# Patient Record
Sex: MALE | Race: WHITE | Employment: FULL TIME | URBAN - METROPOLITAN AREA
[De-identification: names, ages, dates, MRNs, and addresses within clinical notes are randomized per-mention and may not be internally consistent; named-entity substitution may affect disease eponyms.]

---

## 2019-07-03 ENCOUNTER — APPOINTMENT (OUTPATIENT)
Dept: GENERAL RADIOLOGY | Age: 64
DRG: 281 | End: 2019-07-03
Attending: EMERGENCY MEDICINE
Payer: COMMERCIAL

## 2019-07-03 ENCOUNTER — HOSPITAL ENCOUNTER (INPATIENT)
Age: 64
LOS: 1 days | Discharge: ANOTHER HOSPITAL | DRG: 281 | End: 2019-07-05
Attending: EMERGENCY MEDICINE | Admitting: INTERNAL MEDICINE
Payer: COMMERCIAL

## 2019-07-03 DIAGNOSIS — I21.4 NSTEMI (NON-ST ELEVATED MYOCARDIAL INFARCTION) (HCC): Primary | ICD-10-CM

## 2019-07-03 PROBLEM — R03.0 ELEVATED BLOOD PRESSURE READING: Status: ACTIVE | Noted: 2019-07-03

## 2019-07-03 PROBLEM — R07.9 CHEST PAIN: Status: ACTIVE | Noted: 2019-07-03

## 2019-07-03 LAB
ALBUMIN SERPL-MCNC: 3.8 G/DL (ref 3.5–5)
ALBUMIN/GLOB SERPL: 1 {RATIO} (ref 1.1–2.2)
ALP SERPL-CCNC: 93 U/L (ref 45–117)
ALT SERPL-CCNC: 19 U/L (ref 12–78)
AMPHET UR QL SCN: NEGATIVE
ANION GAP SERPL CALC-SCNC: 9 MMOL/L (ref 5–15)
AST SERPL-CCNC: 7 U/L (ref 15–37)
ATRIAL RATE: 74 BPM
BARBITURATES UR QL SCN: NEGATIVE
BASOPHILS # BLD: 0 K/UL (ref 0–0.1)
BASOPHILS NFR BLD: 0 % (ref 0–1)
BENZODIAZ UR QL: NEGATIVE
BILIRUB SERPL-MCNC: 0.2 MG/DL (ref 0.2–1)
BUN SERPL-MCNC: 11 MG/DL (ref 6–20)
BUN/CREAT SERPL: 9 (ref 12–20)
CALCIUM SERPL-MCNC: 9.6 MG/DL (ref 8.5–10.1)
CALCULATED P AXIS, ECG09: 52 DEGREES
CALCULATED R AXIS, ECG10: 63 DEGREES
CALCULATED T AXIS, ECG11: 50 DEGREES
CANNABINOIDS UR QL SCN: NEGATIVE
CHLORIDE SERPL-SCNC: 102 MMOL/L (ref 97–108)
CO2 SERPL-SCNC: 28 MMOL/L (ref 21–32)
COCAINE UR QL SCN: NEGATIVE
COMMENT, HOLDF: NORMAL
CREAT SERPL-MCNC: 1.29 MG/DL (ref 0.7–1.3)
DIAGNOSIS, 93000: NORMAL
DIFFERENTIAL METHOD BLD: ABNORMAL
DRUG SCRN COMMENT,DRGCM: NORMAL
EOSINOPHIL # BLD: 0.2 K/UL (ref 0–0.4)
EOSINOPHIL NFR BLD: 2 % (ref 0–7)
ERYTHROCYTE [DISTWIDTH] IN BLOOD BY AUTOMATED COUNT: 15.2 % (ref 11.5–14.5)
ETHANOL SERPL-MCNC: <10 MG/DL
GLOBULIN SER CALC-MCNC: 3.9 G/DL (ref 2–4)
GLUCOSE SERPL-MCNC: 162 MG/DL (ref 65–100)
HCT VFR BLD AUTO: 40.4 % (ref 36.6–50.3)
HGB BLD-MCNC: 13.3 G/DL (ref 12.1–17)
LIPASE SERPL-CCNC: 186 U/L (ref 73–393)
LYMPHOCYTES # BLD: 1.6 K/UL
LYMPHOCYTES NFR BLD: 16 % (ref 12–49)
MCH RBC QN AUTO: 25.4 PG (ref 26–34)
MCHC RBC AUTO-ENTMCNC: 32.9 G/DL (ref 30–36.5)
MCV RBC AUTO: 77.1 FL (ref 80–99)
METHADONE UR QL: NEGATIVE
MONOCYTES # BLD: 0.5 K/UL (ref 0–1)
MONOCYTES NFR BLD: 5 % (ref 5–13)
NEUTS SEG # BLD: 7.4 K/UL (ref 1.8–8)
NEUTS SEG NFR BLD: 77 % (ref 32–75)
OPIATES UR QL: NEGATIVE
P-R INTERVAL, ECG05: 174 MS
PCP UR QL: NEGATIVE
PLATELET # BLD AUTO: 276 K/UL (ref 150–400)
PMV BLD AUTO: 10.1 FL (ref 8.9–12.9)
POTASSIUM SERPL-SCNC: 4.2 MMOL/L (ref 3.5–5.1)
PROT SERPL-MCNC: 7.7 G/DL (ref 6.4–8.2)
Q-T INTERVAL, ECG07: 390 MS
QRS DURATION, ECG06: 92 MS
QTC CALCULATION (BEZET), ECG08: 432 MS
RBC # BLD AUTO: 5.24 M/UL (ref 4.1–5.7)
SAMPLES BEING HELD,HOLD: NORMAL
SODIUM SERPL-SCNC: 139 MMOL/L (ref 136–145)
TROPONIN I SERPL-MCNC: 0.08 NG/ML
TROPONIN I SERPL-MCNC: 0.29 NG/ML
TSH SERPL DL<=0.05 MIU/L-ACNC: 0.13 UIU/ML (ref 0.36–3.74)
VENTRICULAR RATE, ECG03: 74 BPM
WBC # BLD AUTO: 9.6 K/UL (ref 4.1–11.1)

## 2019-07-03 PROCEDURE — 84484 ASSAY OF TROPONIN QUANT: CPT

## 2019-07-03 PROCEDURE — 93005 ELECTROCARDIOGRAM TRACING: CPT

## 2019-07-03 PROCEDURE — 99218 HC RM OBSERVATION: CPT

## 2019-07-03 PROCEDURE — 99285 EMERGENCY DEPT VISIT HI MDM: CPT

## 2019-07-03 PROCEDURE — 96374 THER/PROPH/DIAG INJ IV PUSH: CPT

## 2019-07-03 PROCEDURE — 84443 ASSAY THYROID STIM HORMONE: CPT

## 2019-07-03 PROCEDURE — 96372 THER/PROPH/DIAG INJ SC/IM: CPT

## 2019-07-03 PROCEDURE — 80053 COMPREHEN METABOLIC PANEL: CPT

## 2019-07-03 PROCEDURE — 71046 X-RAY EXAM CHEST 2 VIEWS: CPT

## 2019-07-03 PROCEDURE — 74011250636 HC RX REV CODE- 250/636: Performed by: INTERNAL MEDICINE

## 2019-07-03 PROCEDURE — 74011250636 HC RX REV CODE- 250/636: Performed by: EMERGENCY MEDICINE

## 2019-07-03 PROCEDURE — 87086 URINE CULTURE/COLONY COUNT: CPT

## 2019-07-03 PROCEDURE — 85025 COMPLETE CBC W/AUTO DIFF WBC: CPT

## 2019-07-03 PROCEDURE — 74011250637 HC RX REV CODE- 250/637: Performed by: INTERNAL MEDICINE

## 2019-07-03 PROCEDURE — 80307 DRUG TEST PRSMV CHEM ANLYZR: CPT

## 2019-07-03 PROCEDURE — 74011000250 HC RX REV CODE- 250: Performed by: EMERGENCY MEDICINE

## 2019-07-03 PROCEDURE — 74011250637 HC RX REV CODE- 250/637: Performed by: EMERGENCY MEDICINE

## 2019-07-03 PROCEDURE — 83690 ASSAY OF LIPASE: CPT

## 2019-07-03 PROCEDURE — 81001 URINALYSIS AUTO W/SCOPE: CPT

## 2019-07-03 PROCEDURE — 36415 COLL VENOUS BLD VENIPUNCTURE: CPT

## 2019-07-03 RX ORDER — OMEPRAZOLE 40 MG/1
40 CAPSULE, DELAYED RELEASE ORAL DAILY
COMMUNITY
End: 2021-08-13

## 2019-07-03 RX ORDER — SODIUM CHLORIDE 0.9 % (FLUSH) 0.9 %
5-40 SYRINGE (ML) INJECTION AS NEEDED
Status: DISCONTINUED | OUTPATIENT
Start: 2019-07-03 | End: 2019-07-05 | Stop reason: HOSPADM

## 2019-07-03 RX ORDER — ONDANSETRON 2 MG/ML
4 INJECTION INTRAMUSCULAR; INTRAVENOUS
Status: DISCONTINUED | OUTPATIENT
Start: 2019-07-03 | End: 2019-07-05 | Stop reason: HOSPADM

## 2019-07-03 RX ORDER — NALOXONE HYDROCHLORIDE 0.4 MG/ML
0.4 INJECTION, SOLUTION INTRAMUSCULAR; INTRAVENOUS; SUBCUTANEOUS AS NEEDED
Status: DISCONTINUED | OUTPATIENT
Start: 2019-07-03 | End: 2019-07-05 | Stop reason: HOSPADM

## 2019-07-03 RX ORDER — ACETAMINOPHEN 325 MG/1
650 TABLET ORAL
Status: DISCONTINUED | OUTPATIENT
Start: 2019-07-03 | End: 2019-07-05 | Stop reason: HOSPADM

## 2019-07-03 RX ORDER — DIPHENHYDRAMINE HCL 25 MG
25 CAPSULE ORAL
Status: DISCONTINUED | OUTPATIENT
Start: 2019-07-03 | End: 2019-07-05 | Stop reason: HOSPADM

## 2019-07-03 RX ORDER — GUAIFENESIN 100 MG/5ML
324 LIQUID (ML) ORAL
Status: COMPLETED | OUTPATIENT
Start: 2019-07-03 | End: 2019-07-03

## 2019-07-03 RX ORDER — CLOPIDOGREL BISULFATE 75 MG/1
300 TABLET ORAL
Status: COMPLETED | OUTPATIENT
Start: 2019-07-03 | End: 2019-07-03

## 2019-07-03 RX ORDER — ZOLPIDEM TARTRATE 5 MG/1
5 TABLET ORAL
Status: DISCONTINUED | OUTPATIENT
Start: 2019-07-03 | End: 2019-07-05 | Stop reason: HOSPADM

## 2019-07-03 RX ORDER — ENOXAPARIN SODIUM 100 MG/ML
1 INJECTION SUBCUTANEOUS
Status: COMPLETED | OUTPATIENT
Start: 2019-07-03 | End: 2019-07-03

## 2019-07-03 RX ORDER — LABETALOL HYDROCHLORIDE 5 MG/ML
20 INJECTION, SOLUTION INTRAVENOUS
Status: DISCONTINUED | OUTPATIENT
Start: 2019-07-03 | End: 2019-07-03

## 2019-07-03 RX ORDER — SODIUM CHLORIDE 0.9 % (FLUSH) 0.9 %
5-40 SYRINGE (ML) INJECTION EVERY 8 HOURS
Status: DISCONTINUED | OUTPATIENT
Start: 2019-07-03 | End: 2019-07-05 | Stop reason: HOSPADM

## 2019-07-03 RX ORDER — FAMOTIDINE 10 MG/ML
20 INJECTION INTRAVENOUS
Status: COMPLETED | OUTPATIENT
Start: 2019-07-03 | End: 2019-07-03

## 2019-07-03 RX ADMIN — ALUMINUM HYDROXIDE AND MAGNESIUM HYDROXIDE 15 ML: 200; 200 SUSPENSION ORAL at 21:33

## 2019-07-03 RX ADMIN — ASPIRIN 81 MG 324 MG: 81 TABLET ORAL at 17:40

## 2019-07-03 RX ADMIN — FAMOTIDINE 20 MG: 10 INJECTION, SOLUTION INTRAVENOUS at 21:34

## 2019-07-03 RX ADMIN — CLOPIDOGREL BISULFATE 300 MG: 75 TABLET ORAL at 19:32

## 2019-07-03 RX ADMIN — FAMOTIDINE 20 MG: 10 INJECTION, SOLUTION INTRAVENOUS at 17:42

## 2019-07-03 RX ADMIN — Medication 10 ML: at 21:34

## 2019-07-03 RX ADMIN — LIDOCAINE HYDROCHLORIDE 40 ML: 20 SOLUTION ORAL; TOPICAL at 17:43

## 2019-07-03 RX ADMIN — NITROGLYCERIN 1 INCH: 20 OINTMENT TOPICAL at 19:31

## 2019-07-03 RX ADMIN — ENOXAPARIN SODIUM 90 MG: 100 INJECTION SUBCUTANEOUS at 19:32

## 2019-07-03 NOTE — Clinical Note
TRANSFER - IN REPORT:  
 
Verbal report received from: [facility]. Report consisted of patient's Situation, Background, Assessment and  
Recommendations(SBAR). Opportunity for questions and clarification was provided. Assessment completed upon patient's arrival to unit and care assumed. Patient transported with a Registered Nurse, [address] [place] [address] / Patient Care Tech and Monitor.

## 2019-07-03 NOTE — Clinical Note
TRANSFER - OUT REPORT:  
 
Verbal report given to: Ed. Report consisted of patient's Situation, Background, Assessment and  
Recommendations(SBAR). Opportunity for questions and clarification was provided. Patient transported with a Registered Nurse. Patient transported to: Brittney Ramirez.

## 2019-07-03 NOTE — ED NOTES
Spoke with dr Ramona Reeder cardiology and he wants pt to be admitted by hospitalist     Hospitalist Eulalio for Admission  7:55 PM    ED Room Number: WER04/04  Patient Name and age:  Jaimie Spencer 58 y.o.  male  Working Diagnosis:   1. NSTEMI (non-ST elevated myocardial infarction) ([facility]      Readmission: no  Isolation Requirements:  no  Recommended Level of Care:  telemetry  Code Status:  full  Other:  Dr Ramona Reeder wants hospitalist admit. Got plavix, aspirin, lovenox, nitropaste. Is cp free.  He is trying to find someone to take care of his dogs currently as they are home alone

## 2019-07-03 NOTE — ED TRIAGE NOTES
Patient stood up from working on his computer 1 1/2 hours ago and developed reflux and/or chest pain. \"I don't have any other symptoms. \"

## 2019-07-03 NOTE — ED PROVIDER NOTES
Pt presents to ED complaining of pain in the chest which started approx 1.5-2hr PTA. Pt reports that he had been sitting at his computer and stood up, then noted sxs. Tried taking OTC tums, lying down, both without significant change. Reports that when he laid down, he felt sxs go \"up into my throat a little. \"  Has h/o GERD (takes OTC omeprazole PRN), though states that these sxs are not typical of his GERD and he cannot think of any particular trigger. Denies sweating, SOB. Denies other radiation. Pt reports he has been told his BP has been high at dentist and doctor visits before, but when he checks it at home it is generally unremarkable. Doesn't truly have a PCP currently - goes to [facility].  Does see GI regularly for h/o UC which has been stable for some time. Denies h/o HLD, though reports it has been several years since his last full physical.  Prior smoker - quit approx 25yrs ago. Also reports that he has no known family h/o early CAD. Reports that he works out 2-3 times per week, including yesterday with cardio, and has been able to tolerate that without difficulty. Past Medical History:   Diagnosis Date    Gastrointestinal disorder        History reviewed. No pertinent surgical history. History reviewed. No pertinent family history. Social History     Socioeconomic History    Marital status: Not on file     Spouse name: Not on file    Number of children: Not on file    Years of education: Not on file    Highest education level: Not on file   Occupational History    Not on file   Social Needs    Financial resource strain: Not on file    Food insecurity:     Worry: Not on file     Inability: Not on file    Transportation needs:     Medical: Not on file     Non-medical: Not on file   Tobacco Use    Smoking status: Former Smoker    Smokeless tobacco: Never Used   Substance and Sexual Activity    Alcohol use:  Yes    Drug use: Not on file    Sexual activity: Not on file   Lifestyle    Physical activity:     Days per week: Not on file     Minutes per session: Not on file    Stress: Not on file   Relationships    Social connections:     Talks on phone: Not on file     Gets together: Not on file     Attends Gnosticist service: Not on file     Active member of club or organization: Not on file     Attends meetings of clubs or organizations: Not on file     Relationship status: Not on file    Intimate partner violence:     Fear of current or ex partner: Not on file     Emotionally abused: Not on file     Physically abused: Not on file     Forced sexual activity: Not on file   Other Topics Concern    Not on file   Social History Narrative    Not on file         ALLERGIES: Patient has no known allergies. Review of Systems   Constitutional: Negative. Negative for fever. HENT: Negative. Respiratory: Negative for cough and shortness of breath. Cardiovascular: Positive for chest pain. Negative for palpitations and leg swelling. Gastrointestinal: Negative. Negative for abdominal pain, nausea and vomiting. Genitourinary: Negative. Musculoskeletal: Negative. Allergic/Immunologic: Negative for immunocompromised state. Psychiatric/Behavioral: Negative. All other systems reviewed and are negative. Vitals:    07/03/19 1646   Pulse: 83   Resp: 14   Temp: 97.7 °F (36.5 °C)   SpO2: 98%   Weight: 85.4 kg (188 lb 4.4 oz)   Height: 6' (1.829 m)            Physical Exam   Constitutional: He is oriented to person, place, and time. He appears well-developed and well-nourished. HENT:   Head: Normocephalic and atraumatic. Eyes:   Conjunctivae clear. Neck: Normal range of motion. Neck supple. Cardiovascular: Normal rate and regular rhythm. Pulmonary/Chest: Effort normal and breath sounds normal.   Abdominal: Soft. Bowel sounds are normal. There is no tenderness. There is no rebound and no guarding.    Neurological: He is alert and oriented to person, place, and time. Skin: Skin is warm and dry. Capillary refill takes less than 2 seconds. Vitals reviewed. MDM  Number of Diagnoses or Management Options  Diagnosis management comments: 62y/o M with h/o UC, questionable h/o HTN, prior smoker presents to ED with chest pain. HPI and PE as noted. EKG, labs to include troponin, CXR, will give ASA for cardiac protection, meds for ? GI sxs as this may be a component. Will plan for 2hr rpt troponin if first is negative, dispo pending outcome. EKG performed today @ 1641hrs interpreted by me. NSR @ 74bpm with sinus arrythmia. Normal axis. Normal intervals. Borderline LVH, otherwise normal QRS. No acute ischemic changes. No priors available for comparison. HEART score = 1 (2 if giving HTN as dx)         Procedures    Care transitioned to Dr. Antonio Morfin at 1900 hrs.

## 2019-07-04 PROBLEM — I21.4 NSTEMI (NON-ST ELEVATED MYOCARDIAL INFARCTION) (HCC): Status: ACTIVE | Noted: 2019-07-04

## 2019-07-04 LAB
APPEARANCE UR: CLEAR
APTT PPP: 30.5 SEC (ref 22.1–32)
APTT PPP: 44.3 SEC (ref 22.1–32)
APTT PPP: 61.6 SEC (ref 22.1–32)
BACTERIA URNS QL MICRO: NEGATIVE /HPF
BASOPHILS # BLD: 0.1 K/UL (ref 0–0.1)
BASOPHILS NFR BLD: 1 % (ref 0–1)
BILIRUB UR QL: NEGATIVE
CHOLEST SERPL-MCNC: 150 MG/DL
COLOR UR: NORMAL
DIFFERENTIAL METHOD BLD: ABNORMAL
EOSINOPHIL # BLD: 0.3 K/UL (ref 0–0.4)
EOSINOPHIL NFR BLD: 3 % (ref 0–7)
EPITH CASTS URNS QL MICRO: NORMAL /LPF
ERYTHROCYTE [DISTWIDTH] IN BLOOD BY AUTOMATED COUNT: 15 % (ref 11.5–14.5)
EST. AVERAGE GLUCOSE BLD GHB EST-MCNC: 123 MG/DL
GLUCOSE UR STRIP.AUTO-MCNC: NEGATIVE MG/DL
HBA1C MFR BLD: 5.9 % (ref 4.2–6.3)
HCT VFR BLD AUTO: 39.2 % (ref 36.6–50.3)
HDLC SERPL-MCNC: 35 MG/DL
HDLC SERPL: 4.3 {RATIO} (ref 0–5)
HGB BLD-MCNC: 12.7 G/DL (ref 12.1–17)
HGB UR QL STRIP: NEGATIVE
IMM GRANULOCYTES # BLD AUTO: 0.1 K/UL (ref 0–0.04)
IMM GRANULOCYTES NFR BLD AUTO: 1 % (ref 0–0.5)
KETONES UR QL STRIP.AUTO: NEGATIVE MG/DL
LDLC SERPL CALC-MCNC: 90.4 MG/DL (ref 0–100)
LEUKOCYTE ESTERASE UR QL STRIP.AUTO: NEGATIVE
LIPID PROFILE,FLP: NORMAL
LYMPHOCYTES # BLD: 2.5 K/UL (ref 0.8–3.5)
LYMPHOCYTES NFR BLD: 23 % (ref 12–49)
MCH RBC QN AUTO: 25 PG (ref 26–34)
MCHC RBC AUTO-ENTMCNC: 32.4 G/DL (ref 30–36.5)
MCV RBC AUTO: 77 FL (ref 80–99)
MONOCYTES # BLD: 0.7 K/UL (ref 0–1)
MONOCYTES NFR BLD: 6 % (ref 5–13)
NEUTS SEG # BLD: 7.3 K/UL (ref 1.8–8)
NEUTS SEG NFR BLD: 66 % (ref 32–75)
NITRITE UR QL STRIP.AUTO: NEGATIVE
NRBC # BLD: 0 K/UL (ref 0–0.01)
NRBC BLD-RTO: 0 PER 100 WBC
PH UR STRIP: 7 [PH] (ref 5–8)
PLATELET # BLD AUTO: 265 K/UL (ref 150–400)
PMV BLD AUTO: 9.5 FL (ref 8.9–12.9)
PROT UR STRIP-MCNC: NEGATIVE MG/DL
RBC # BLD AUTO: 5.09 M/UL (ref 4.1–5.7)
RBC #/AREA URNS HPF: NORMAL /HPF (ref 0–5)
SP GR UR REFRACTOMETRY: 1.01 (ref 1–1.03)
THERAPEUTIC RANGE,PTTT: ABNORMAL SECS (ref 58–77)
THERAPEUTIC RANGE,PTTT: ABNORMAL SECS (ref 58–77)
THERAPEUTIC RANGE,PTTT: NORMAL SECS (ref 58–77)
TRIGL SERPL-MCNC: 123 MG/DL (ref ?–150)
TROPONIN I SERPL-MCNC: 4.28 NG/ML
TROPONIN I SERPL-MCNC: 5.48 NG/ML
UROBILINOGEN UR QL STRIP.AUTO: 0.2 EU/DL (ref 0.2–1)
VLDLC SERPL CALC-MCNC: 24.6 MG/DL
WBC # BLD AUTO: 10.9 K/UL (ref 4.1–11.1)
WBC URNS QL MICRO: NORMAL /HPF (ref 0–4)

## 2019-07-04 PROCEDURE — 80061 LIPID PANEL: CPT

## 2019-07-04 PROCEDURE — 74011250636 HC RX REV CODE- 250/636: Performed by: INTERNAL MEDICINE

## 2019-07-04 PROCEDURE — 94760 N-INVAS EAR/PLS OXIMETRY 1: CPT

## 2019-07-04 PROCEDURE — 36415 COLL VENOUS BLD VENIPUNCTURE: CPT

## 2019-07-04 PROCEDURE — 83036 HEMOGLOBIN GLYCOSYLATED A1C: CPT

## 2019-07-04 PROCEDURE — 85025 COMPLETE CBC W/AUTO DIFF WBC: CPT

## 2019-07-04 PROCEDURE — 65660000000 HC RM CCU STEPDOWN

## 2019-07-04 PROCEDURE — 84484 ASSAY OF TROPONIN QUANT: CPT

## 2019-07-04 PROCEDURE — 74011250637 HC RX REV CODE- 250/637: Performed by: INTERNAL MEDICINE

## 2019-07-04 PROCEDURE — 84436 ASSAY OF TOTAL THYROXINE: CPT

## 2019-07-04 PROCEDURE — 85730 THROMBOPLASTIN TIME PARTIAL: CPT

## 2019-07-04 PROCEDURE — 99218 HC RM OBSERVATION: CPT

## 2019-07-04 RX ORDER — HEPARIN SODIUM 10000 [USP'U]/100ML
11.7-25 INJECTION, SOLUTION INTRAVENOUS
Status: DISCONTINUED | OUTPATIENT
Start: 2019-07-04 | End: 2019-07-05

## 2019-07-04 RX ORDER — HEPARIN SODIUM 1000 [USP'U]/ML
3000 INJECTION, SOLUTION INTRAVENOUS; SUBCUTANEOUS ONCE
Status: DISCONTINUED | OUTPATIENT
Start: 2019-07-04 | End: 2019-07-04 | Stop reason: DRUGHIGH

## 2019-07-04 RX ORDER — ATORVASTATIN CALCIUM 20 MG/1
40 TABLET, FILM COATED ORAL DAILY
Status: DISCONTINUED | OUTPATIENT
Start: 2019-07-04 | End: 2019-07-05

## 2019-07-04 RX ORDER — CARVEDILOL 6.25 MG/1
6.25 TABLET ORAL 2 TIMES DAILY WITH MEALS
Status: DISCONTINUED | OUTPATIENT
Start: 2019-07-04 | End: 2019-07-05

## 2019-07-04 RX ORDER — HEPARIN SODIUM 5000 [USP'U]/ML
2000 INJECTION, SOLUTION INTRAVENOUS; SUBCUTANEOUS ONCE
Status: COMPLETED | OUTPATIENT
Start: 2019-07-04 | End: 2019-07-04

## 2019-07-04 RX ORDER — CARVEDILOL 6.25 MG/1
6.25 TABLET ORAL 2 TIMES DAILY WITH MEALS
Status: DISCONTINUED | OUTPATIENT
Start: 2019-07-04 | End: 2019-07-04

## 2019-07-04 RX ORDER — GUAIFENESIN 100 MG/5ML
81 LIQUID (ML) ORAL DAILY
Status: DISCONTINUED | OUTPATIENT
Start: 2019-07-04 | End: 2019-07-05

## 2019-07-04 RX ORDER — PANTOPRAZOLE SODIUM 40 MG/1
40 TABLET, DELAYED RELEASE ORAL
Status: DISCONTINUED | OUTPATIENT
Start: 2019-07-05 | End: 2019-07-05

## 2019-07-04 RX ORDER — HEPARIN SODIUM 5000 [USP'U]/ML
4000 INJECTION, SOLUTION INTRAVENOUS; SUBCUTANEOUS ONCE
Status: COMPLETED | OUTPATIENT
Start: 2019-07-04 | End: 2019-07-04

## 2019-07-04 RX ADMIN — HEPARIN SODIUM 4000 UNITS: 5000 INJECTION INTRAVENOUS; SUBCUTANEOUS at 08:49

## 2019-07-04 RX ADMIN — ALUMINUM HYDROXIDE AND MAGNESIUM HYDROXIDE 15 ML: 200; 200 SUSPENSION ORAL at 11:15

## 2019-07-04 RX ADMIN — FAMOTIDINE 20 MG: 10 INJECTION, SOLUTION INTRAVENOUS at 21:23

## 2019-07-04 RX ADMIN — HEPARIN SODIUM 2000 UNITS: 5000 INJECTION, SOLUTION INTRAVENOUS; SUBCUTANEOUS at 15:34

## 2019-07-04 RX ADMIN — ASPIRIN 81 MG 81 MG: 81 TABLET ORAL at 08:08

## 2019-07-04 RX ADMIN — CARVEDILOL 6.25 MG: 6.25 TABLET, FILM COATED ORAL at 12:50

## 2019-07-04 RX ADMIN — ALUMINUM HYDROXIDE AND MAGNESIUM HYDROXIDE 15 ML: 200; 200 SUSPENSION ORAL at 07:45

## 2019-07-04 RX ADMIN — ALUMINUM HYDROXIDE AND MAGNESIUM HYDROXIDE 15 ML: 200; 200 SUSPENSION ORAL at 16:15

## 2019-07-04 RX ADMIN — ATORVASTATIN CALCIUM 40 MG: 20 TABLET, FILM COATED ORAL at 11:15

## 2019-07-04 RX ADMIN — Medication 10 ML: at 21:23

## 2019-07-04 RX ADMIN — FAMOTIDINE 20 MG: 10 INJECTION, SOLUTION INTRAVENOUS at 07:45

## 2019-07-04 RX ADMIN — HEPARIN SODIUM AND DEXTROSE 11.7 UNITS/KG/HR: 10000; 5 INJECTION INTRAVENOUS at 08:46

## 2019-07-04 RX ADMIN — CARVEDILOL 6.25 MG: 6.25 TABLET, FILM COATED ORAL at 21:23

## 2019-07-04 NOTE — H&P
[facility]    Hospitalist Admission Note      NAME:  Batsheva Galloway   :   1955   MRN:  [id number]     PCP:  None     Date/Time:  7/3/2019 8:06 PM          Subjective:     CHIEF COMPLAINT: chest pain     HISTORY OF PRESENT ILLNESS:     Mr. Gilmar Rodriguez is a 58 y.o.  male who presented to the Emergency Department complaining of chest pain. Began today at rest.  Substernal, similar but more intense than usual GERD. No dyspnea, diaphoresis, nausea. Perhaps radiated to neck. Hx of GERD. ER finds elevated BP and minimally elevated troponin. We will admit for observation. Past Medical History:   Diagnosis Date    GERD (gastroesophageal reflux disease)     Ulcerative colitis, chronic ([facility]         History reviewed. No pertinent surgical history. Social History     Tobacco Use    Smoking status: Former Smoker    Smokeless tobacco: Never Used   Substance Use Topics    Alcohol use: Yes        Family History   Problem Relation Age of Onset    GERD Mother     Inflammatory Bowel Dz Mother     Inflammatory Bowel Dz Father       No Known Allergies     Prior to Admission medications    Medication Sig Start Date End Date Taking? Authorizing Provider   ASPIRIN PO Take 1 Tab by mouth daily. Yes Other, MD Gifty   omeprazole (PRILOSEC) 40 mg capsule Take 40 mg by mouth daily.    Yes Other, MD Gifty       Review of Systems:  (bold if positive, if negative)    Gen:  Eyes:  ENT:  CVS:  chest pain,Pulm:  GI:  :  MS:  Skin:  Psych:  Endo:  Hem:  Renal:  Neuro:        Objective:      VITALS:    Vital signs reviewed; most recent are:    Visit Vitals  BP (!) 147/97   Pulse 85   Temp 97.8 °F (36.6 °C)   Resp 18   Ht 6' (1.829 m)   Wt 85.4 kg (188 lb 4.4 oz)   SpO2 99%   BMI 25.53 kg/m²     SpO2 Readings from Last 6 Encounters:   19 99%        No intake or output data in the 24 hours ending 19 2130     Exam:     Physical Exam:    Gen:  Well-developed, well-nourished, in no acute distress  HEENT:  Pink conjunctivae, PERRL, hearing intact to voice, moist mucous membranes  Neck:  Supple, without masses, thyroid non-tender  Resp:  No accessory muscle use, clear breath sounds without wheezes rales or rhonchi  Card:  No murmurs, normal S1, S2 without thrills, bruits or peripheral edema  Abd:  Soft, non-tender, non-distended, normoactive bowel sounds are present, no mass  Lymph:  No cervical or inguinal adenopathy  Musc:  No cyanosis or clubbing  Skin:  No rashes or ulcers, skin turgor is good  Neuro:  Cranial nerves are grossly intact, no focal motor weakness, follows commands appropriately  Psych:  Good insight, oriented to person, place and time, alert     Labs:    Recent Labs     07/03/19  1656   WBC 9.6   HGB 13.3   HCT 40.4        Recent Labs     07/03/19  1656      K 4.2      CO2 28   *   BUN 11   CREA 1.29   CA 9.6   ALB 3.8   TBILI 0.2   SGOT 7*   ALT 19     No results found for: GLUCPOC  No results for input(s): PH, PCO2, PO2, HCO3, FIO2 in the last 72 hours. No results for input(s): INR in the last 72 hours. No lab exists for component: INREXT, INREXT  All Micro Results     Procedure Component Value Units Date/Time    CULTURE, URINE [[id number]]     Order Status:  Sent Specimen:  Urine from Clean catch           I have reviewed previous records       Assessment and Plan:      NSTEMI / Chest pain / Elevated troponin - POA, unclear etiology. Pain is likely either AMI vs GERD. Troponin so far is explainable by strain of BP, but monitor for continued rise. Consult cardiology. Monitor tele. NPO at midnight in case cardiology wants procedure   Addendum - Troponin much higher. Likely cath today    Elevated blood pressure reading - POA, likely chronic and untreated, exacerbated by anxiety. NTG patch for now. GERD (gastroesophageal reflux disease) - Give famotidine and Maalox now.     Ulcerative colitis, chronic - Resume daily mesalamine    Elevated blood glucose - Likely stress, but check A1c       Telemetry reviewed:   normal sinus rhythm    Risk of deterioration: high      Total time spent with patient: 79 Minutes I reviewed chart, notes, data and current medications in the medical record. I have examined and treated the patient at bedside during this period.                  Care Plan discussed with: Patient, Nursing Staff and >50% of time spent in counseling and coordination of care    Discussed:  Care Plan       ___________________________________________________    Attending Physician: Demetria Rosario MD

## 2019-07-04 NOTE — ED NOTES
Patient was able to make arrangements with his ex wife to take care of his animals tomorrow. Prior to that he was hesitant to stay in the hospital overnight. [facility] to be made aware.

## 2019-07-04 NOTE — ED NOTES
Transfer Assessment: Patient A&O x4 and in no distress. Physical re-examination reveals some improvement in pts condition with reassessment of vital signs completed at the time of admission transfer and/or discharge.

## 2019-07-04 NOTE — ED NOTES
TRANSFER - OUT REPORT:    Verbal report given to francisca velasco rn (name) on Thea Hamilton  being transferred to [facility] (unit) for routine progression of care       Report consisted of patients Situation, Background, Assessment and   Recommendations(SBAR). Information from the following report(s) SBAR was reviewed with the receiving nurse. Lines:   Peripheral IV 07/03/19 Right Antecubital (Active)   Site Assessment Clean, dry, & intact 7/3/2019  4:50 PM   Phlebitis Assessment 0 7/3/2019  4:50 PM   Infiltration Assessment 0 7/3/2019  4:50 PM   Dressing Status Clean, dry, & intact 7/3/2019  4:50 PM   Dressing Type Transparent 7/3/2019  4:50 PM   Hub Color/Line Status Pink 7/3/2019  4:50 PM   Action Taken Blood drawn 7/3/2019  4:50 PM        Opportunity for questions and clarification was provided.       Patient transported with:   Monitor

## 2019-07-04 NOTE — CONSULTS
Jeanette Gonzalez MD. [facility]              Patient:   : 1955      Today's Date: 2019          HISTORY OF PRESENT ILLNESS:     History of Present Illness:  Mr. Britta Perez is a 58 y.o.  male with history of Ulcerative colitis and GERD who presented to the Emergency Department complaining of chest pain yesterday. Noticed after he got up from working at computer - moderate, pressure like - took tums but it persisted so drove himself in - CP started getting better before getting to ER (lasted for 2 hours before becoming a mild pressure). He feels fine now and no further chest pain. EKG was normal, but trop of 5. PAST MEDICAL HISTORY:     Past Medical History:   Diagnosis Date    GERD (gastroesophageal reflux disease)     Ulcerative colitis, chronic (HCC)          MEDICATIONS:               Prior to Admission medications    Medication Sig Start Date End Date Taking?  Authorizing Provider   ASPIRIN PO Take 1 Tab by mouth daily.     Yes Other, MD Gifty   omeprazole (PRILOSEC) 40 mg capsule Take 40 mg by mouth daily.     Yes Other, MD Gifty              Current Facility-Administered Medications   Medication Dose Route Frequency Provider Last Rate Last Dose    aspirin chewable tablet 81 mg  81 mg Oral DAILY Bela Iniguez DO   81 mg at 19 3061    heparin 25,000 units in D5W 250 ml infusion  11.7-25 Units/kg/hr IntraVENous TITRATE Bela Iniguez DO 10 mL/hr at 19 1000 11.7 Units/kg/hr at 19 1000    atorvastatin (LIPITOR) tablet 40 mg  40 mg Oral DAILY Bela Iniguez DO   40 mg at 19 1115    carvedilol (COREG) tablet 6.25 mg  6.25 mg Oral BID WITH MEALS Bela Iniguez DO        [START ON 2019] pantoprazole (PROTONIX) tablet 40 mg  40 mg Oral ACB Bela Iniguez DO        sodium chloride (NS) flush 5-40 mL  5-40 mL IntraVENous Q8H Cindy Munguia MD   10 mL at 19 2134    sodium chloride (NS) flush 5-40 mL  5-40 mL IntraVENous PRN Sahil Alcala MD        naloxone [facility]) injection 0.4 mg  0.4 mg IntraVENous PRN Sahil Alcala MD        zolpidem [facility]) tablet 5 mg  5 mg Oral QHS PRN Sahil Alcala MD        acetaminophen (TYLENOL) tablet 650 mg  650 mg Oral Q4H PRN Sahil Alcala MD        diphenhydrAMINE (BENADRYL) capsule 25 mg  25 mg Oral Q4H PRN Sahil Alcala MD        ondansetron [facility]) injection 4 mg  4 mg IntraVENous Q4H PRN Sahil Alcala MD        famotidine (PF) (PEPCID) 20 mg in sodium chloride 0.9% 10 mL injection  20 mg IntraVENous Q12H Sahil Alcala MD   20 mg at 07/04/19 0745    aluminum-magnesium hydroxide (MAALOX) oral suspension 15 mL  15 mL Oral TID WITH MEALS Sahil Alcala MD   15 mL at 07/04/19 1115       No Known Allergies          SOCIAL HISTORY:     Social History     Tobacco Use    Smoking status: Former Smoker    Smokeless tobacco: Never Used   Substance Use Topics    Alcohol use: Yes    Drug use: Not Currently         FAMILY HISTORY:     Family History   Problem Relation Age of Onset    GERD Mother     Inflammatory Bowel Dz Mother     Inflammatory Bowel Dz Father            REVIEW OF SYMPTOMS:     Review of Symptoms:  Constitutional: Negative for fever, chills  HEENT: Negative for nosebleeds, tinnitus, and vision changes. Respiratory: Negative for cough, wheezing  Cardiovascular: Negative for orthopnea, claudication, syncope, and PND. Gastrointestinal: Negative for abdominal pain, diarrhea, melena. Genitourinary: Negative for dysuria  Musculoskeletal: Negative for myalgias. Skin: Negative for rash  Heme: No problems bleeding. Neurological: Negative for speech change and focal weakness.          PHYSICAL EXAM:     Physical Exam:  Visit Vitals  /85 (BP 1 Location: Left arm, BP Patient Position: Head of bed elevated (Comment degrees))   Pulse 70   Temp 98.1 °F (36.7 °C)   Resp 19   Ht 6' (1.829 m)   Wt 188 lb 4.4 oz (85.4 kg)   SpO2 98%   BMI 25.53 kg/m²     Patient appears generally well, mood and affect are appropriate and pleasant. HEENT:  Hearing intact, non-icteric, normocephalic, atraumatic. Neck Exam: Supple, No JVD or carotid bruits. Lung Exam: Clear to auscultation, even breath sounds. Cardiac Exam: Regular rate and rhythm with no murmur  Abdomen: Soft, non-tender, normal bowel sounds. No bruits or masses. Extremities: Moves all ext well. No lower extremity edema. Vascular: 2+ dorsalis pedis pulses bilaterally. Psych: Appropriate affect  Neuro - Grossly intact      LABS / OTHER STUDIES:     Recent Results (from the past 24 hour(s))   EKG, 12 LEAD, INITIAL    Collection Time: 07/03/19  4:41 PM   Result Value Ref Range    Ventricular Rate 74 BPM    Atrial Rate 74 BPM    P-R Interval 174 ms    QRS Duration 92 ms    Q-T Interval 390 ms    QTC Calculation (Bezet) 432 ms    Calculated P Axis 52 degrees    Calculated R Axis 63 degrees    Calculated T Axis 50 degrees    Diagnosis       Sinus rhythm with marked sinus arrhythmia  Otherwise normal ECG  No previous ECGs available  Confirmed by Giovanny Gupta MD., [facility]ero ([id number]) on 7/3/2019 9:36:16 PM     SAMPLES BEING HELD    Collection Time: 07/03/19  4:56 PM   Result Value Ref Range    SAMPLES BEING HELD 1 RED 1 PST 1 LAV 1 BLUE     COMMENT        Add-on orders for these samples will be processed based on acceptable specimen integrity and analyte stability, which may vary by analyte.    CBC WITH AUTOMATED DIFF    Collection Time: 07/03/19  4:56 PM   Result Value Ref Range    WBC 9.6 4.1 - 11.1 K/uL    RBC 5.24 4.10 - 5.70 M/uL    HGB 13.3 12.1 - 17.0 g/dL    HCT 40.4 36.6 - 50.3 %    MCV 77.1 (L) 80.0 - 99.0 FL    MCH 25.4 (L) 26.0 - 34.0 PG    MCHC 32.9 30.0 - 36.5 g/dL    RDW 15.2 (H) 11.5 - 14.5 %    PLATELET 271 119 - 510 K/uL    MPV 10.1 8.9 - 12.9 FL    NEUTROPHILS 77 (H) 32 - 75 %    LYMPHOCYTES 16 12 - 49 %    MONOCYTES 5 5 - 13 %    EOSINOPHILS 2 0 - 7 %    BASOPHILS 0 0 - 1 %    ABS. NEUTROPHILS 7.4 1.8 - 8.0 K/UL    ABS. LYMPHOCYTES 1.6 K/UL    ABS. MONOCYTES 0.5 0.0 - 1.0 K/UL    ABS. EOSINOPHILS 0.2 0.0 - 0.4 K/UL    ABS. BASOPHILS 0.0 0.0 - 0.1 K/UL    DF AUTOMATED     METABOLIC PANEL, COMPREHENSIVE    Collection Time: 07/03/19  4:56 PM   Result Value Ref Range    Sodium 139 136 - 145 mmol/L    Potassium 4.2 3.5 - 5.1 mmol/L    Chloride 102 97 - 108 mmol/L    CO2 28 21 - 32 mmol/L    Anion gap 9 5 - 15 mmol/L    Glucose 162 (H) 65 - 100 mg/dL    BUN 11 6 - 20 MG/DL    Creatinine 1.29 0.70 - 1.30 MG/DL    BUN/Creatinine ratio 9 (L) 12 - 20      GFR est AA >60 >60 ml/min/1.73m2    GFR est non-AA 56 (L) >60 ml/min/1.73m2    Calcium 9.6 8.5 - 10.1 MG/DL    Bilirubin, total 0.2 0.2 - 1.0 MG/DL    ALT (SGPT) 19 12 - 78 U/L    AST (SGOT) 7 (L) 15 - 37 U/L    Alk.  phosphatase 93 45 - 117 U/L    Protein, total 7.7 6.4 - 8.2 g/dL    Albumin 3.8 3.5 - 5.0 g/dL    Globulin 3.9 2.0 - 4.0 g/dL    A-G Ratio 1.0 (L) 1.1 - 2.2     LIPASE    Collection Time: 07/03/19  4:56 PM   Result Value Ref Range    Lipase 186 73 - 393 U/L   TROPONIN I    Collection Time: 07/03/19  4:56 PM   Result Value Ref Range    Troponin-I, Qt. 0.08 (H) <0.05 ng/mL   ETHYL ALCOHOL    Collection Time: 07/03/19  4:56 PM   Result Value Ref Range    ALCOHOL(ETHYL),SERUM <10 <10 MG/DL   EKG, 12 LEAD, SUBSEQUENT    Collection Time: 07/03/19  6:20 PM   Result Value Ref Range    Ventricular Rate 64 BPM    Atrial Rate 64 BPM    P-R Interval 156 ms    QRS Duration 84 ms    Q-T Interval 432 ms    QTC Calculation (Bezet) 445 ms    Calculated R Axis 49 degrees    Calculated T Axis 58 degrees    Diagnosis       Sinus rhythm with marked sinus arrhythmia  Otherwise normal ECG  When compared with ECG of 03-JUL-2019 16:41,  MANUAL COMPARISON REQUIRED, DATA IS UNCONFIRMED     TROPONIN I    Collection Time: 07/03/19  6:46 PM   Result Value Ref Range    Troponin-I, Qt. 0.29 (H) <0.05 ng/mL   TSH 3RD GENERATION    Collection Time: 07/03/19 6:46 PM   Result Value Ref Range    TSH 0.13 (L) 0.36 - 3.74 uIU/mL   DRUG SCREEN, URINE    Collection Time: 07/03/19 10:46 PM   Result Value Ref Range    AMPHETAMINES NEGATIVE  NEG      BARBITURATES NEGATIVE  NEG      BENZODIAZEPINES NEGATIVE  NEG      COCAINE NEGATIVE  NEG      METHADONE NEGATIVE  NEG      OPIATES NEGATIVE  NEG      PCP(PHENCYCLIDINE) NEGATIVE  NEG      THC (TH-CANNABINOL) NEGATIVE  NEG      Drug screen comment (NOTE)    LIPID PANEL    Collection Time: 07/04/19  4:51 AM   Result Value Ref Range    LIPID PROFILE          Cholesterol, total 150 <200 MG/DL    Triglyceride 123 <150 MG/DL    HDL Cholesterol 35 MG/DL    LDL, calculated 90.4 0 - 100 MG/DL    VLDL, calculated 24.6 MG/DL    CHOL/HDL Ratio 4.3 0.0 - 5.0     HEMOGLOBIN A1C WITH EAG    Collection Time: 07/04/19  4:51 AM   Result Value Ref Range    Hemoglobin A1c 5.9 4.2 - 6.3 %    Est. average glucose 123 mg/dL   TROPONIN I    Collection Time: 07/04/19  4:51 AM   Result Value Ref Range    Troponin-I, Qt. 5.48 (H) <0.05 ng/mL   PTT    Collection Time: 07/04/19  8:04 AM   Result Value Ref Range    aPTT 30.5 22.1 - 32.0 sec    aPTT, therapeutic range     58.0 - 77.0 SECS   CBC WITH AUTOMATED DIFF    Collection Time: 07/04/19  8:04 AM   Result Value Ref Range    WBC 10.9 4.1 - 11.1 K/uL    RBC 5.09 4. 10 - 5.70 M/uL    HGB 12.7 12.1 - 17.0 g/dL    HCT 39.2 36.6 - 50.3 %    MCV 77.0 (L) 80.0 - 99.0 FL    MCH 25.0 (L) 26.0 - 34.0 PG    MCHC 32.4 30.0 - 36.5 g/dL    RDW 15.0 (H) 11.5 - 14.5 %    PLATELET 383 843 - 957 K/uL    MPV 9.5 8.9 - 12.9 FL    NRBC 0.0 0  WBC    ABSOLUTE NRBC 0.00 0.00 - 0.01 K/uL    NEUTROPHILS 66 32 - 75 %    LYMPHOCYTES 23 12 - 49 %    MONOCYTES 6 5 - 13 %    EOSINOPHILS 3 0 - 7 %    BASOPHILS 1 0 - 1 %    IMMATURE GRANULOCYTES 1 (H) 0.0 - 0.5 %    ABS. NEUTROPHILS 7.3 1.8 - 8.0 K/UL    ABS. LYMPHOCYTES 2.5 0.8 - 3.5 K/UL    ABS. MONOCYTES 0.7 0.0 - 1.0 K/UL    ABS. EOSINOPHILS 0.3 0.0 - 0.4 K/UL    ABS.  BASOPHILS 0.1 0.0 - 0.1 K/UL    ABS. IMM. GRANS. 0.1 (H) 0.00 - 0.04 K/UL    DF AUTOMATED             CARDIAC DIAGNOSTICS:     Cardiac Evaluation Includes:  EKG 7/3/19 - NSR, no ischemic changes       ASSESSMENT AND PLAN:     Assessment and Plan:  1) NSTEMI ]  - He has ruled in for MI. Is now CP free. - Will proceed with a cardiac cath tomorrow (cath lab team not in today due to July 4 holiday). If he has worsening CP, then can cath urgently. - Cont ASA and IV heparin. Statin and beta-blocker have been added. - I reviewed risks and benefits (bleeding, MI, CVA, JOSE JUAN, death, etc) and he agrees to proceed. He would like the cath done earlier in day since he has dogs at home that need his attention. 2) HTN   - BP has been high ---> Coreg added         Berenice Rendon MD, [place] [id number] [facility], [address]  [address]  [address] [place] [address], [address] [place] [address]  Ph: [phone]   Ph [phone]

## 2019-07-04 NOTE — ED NOTES
TRANSFER - OUT REPORT:    Verbal report given to GUMARO Medic (name) on Toro Franco  being transferred to [facility] (unit) for routine progression of care       Report consisted of patients Situation, Background, Assessment and   Recommendations(SBAR). Information from the following report(s) SBAR was reviewed with the receiving nurse. Lines:   Peripheral IV 07/03/19 Right Antecubital (Active)   Site Assessment Clean, dry, & intact 7/3/2019  4:50 PM   Phlebitis Assessment 0 7/3/2019  4:50 PM   Infiltration Assessment 0 7/3/2019  4:50 PM   Dressing Status Clean, dry, & intact 7/3/2019  4:50 PM   Dressing Type Transparent 7/3/2019  4:50 PM   Hub Color/Line Status Pink 7/3/2019  4:50 PM   Action Taken Blood drawn 7/3/2019  4:50 PM        Opportunity for questions and clarification was provided.       Patient transported with:   Monitor

## 2019-07-04 NOTE — PROGRESS NOTES
[facility]    Medical Progress Note      NAME: Ed Crystal   :  1955  MRM:  [id number]    Date/Time: 2019  10:48 AM          Assessment and Plan:     Principal Problem:    NSTEMI (non-ST elevated myocardial infarction) / CP. Trop spiked overnight and ruled in. Cardiology consult for C. Start heparin gtt protocol. Given ASA + loading plavix in ED. ASA alone for now (in case CABG). Check echo. Start coreg for BP and NSTEMI. FLP with LDL at 90, start statin. NPO until decision on LHC today v. Tomorrow. Active Problems:    GERD (gastroesophageal reflux disease) (). Cont PPI and added H2B      Ulcerative colitis, chronic (HCC) (). Awaiting med rec. He is on a version of mesalamine but not sure which      Elevated blood pressure reading (7/3/2019). Likely has HTN, BP improving after nitro dose. Start low dose coreg and monitor           Subjective:     Chief Complaint:  Patient seen and examined. Chart reviewed. Patient reports no further episodes of chest pain. ROS:  (bold if positive, if negative)    Tolerating PT  Tolerating Diet        Objective:     Last 24hrs VS reviewed since prior progress note.  Most recent are:    Visit Vitals  /85 (BP 1 Location: Left arm, BP Patient Position: Head of bed elevated (Comment degrees))   Pulse 70   Temp 98.1 °F (36.7 °C)   Resp 19   Ht 6' (1.829 m)   Wt 85.4 kg (188 lb 4.4 oz)   SpO2 98%   BMI 25.53 kg/m²     SpO2 Readings from Last 6 Encounters:   19 98%            Intake/Output Summary (Last 24 hours) at 2019 1048  Last data filed at 2019 0210  Gross per 24 hour   Intake 600 ml   Output 300 ml   Net 300 ml        Physical Exam:    Gen:  Well-developed, well-nourished, in no acute distress  HEENT:  Pink conjunctivae, PERRL, hearing intact to voice, moist mucous membranes  Neck:  Supple, without masses, thyroid non-tender  Resp:  No accessory muscle use, clear breath sounds without wheezes rales or rhonchi  Card: No murmurs, normal S1, S2 without thrills, bruits or peripheral edema  Abd:  Soft, non-tender, non-distended, normoactive bowel sounds are present, no palpable organomegaly and no detectable hernias  Lymph:  No cervical or inguinal adenopathy  Musc:  No cyanosis or clubbing  Skin:  No rashes or ulcers, skin turgor is good  Neuro:  Cranial nerves are grossly intact, no focal motor weakness, follows commands appropriately  Psych:  Good insight, oriented to person, place and time, alert    Telemetry reviewed:   normal sinus rhythm  __________________________________________________________________  Medications Reviewed: (see below)  Medications:     Current Facility-Administered Medications   Medication Dose Route Frequency    aspirin chewable tablet 81 mg  81 mg Oral DAILY    heparin 25,000 units in D5W 250 ml infusion  11.7-25 Units/kg/hr IntraVENous TITRATE    sodium chloride (NS) flush 5-40 mL  5-40 mL IntraVENous Q8H    sodium chloride (NS) flush 5-40 mL  5-40 mL IntraVENous PRN    naloxone (NARCAN) injection 0.4 mg  0.4 mg IntraVENous PRN    zolpidem (AMBIEN) tablet 5 mg  5 mg Oral QHS PRN    acetaminophen (TYLENOL) tablet 650 mg  650 mg Oral Q4H PRN    diphenhydrAMINE (BENADRYL) capsule 25 mg  25 mg Oral Q4H PRN    ondansetron (ZOFRAN) injection 4 mg  4 mg IntraVENous Q4H PRN    famotidine (PF) (PEPCID) 20 mg in sodium chloride 0.9% 10 mL injection  20 mg IntraVENous Q12H    aluminum-magnesium hydroxide (MAALOX) oral suspension 15 mL  15 mL Oral TID WITH MEALS        Lab Data Reviewed: (see below)  Lab Review:     Recent Labs     07/04/19  0804 07/03/19  1656   WBC 10.9 9.6   HGB 12.7 13.3   HCT 39.2 40.4    276     Recent Labs     07/03/19  1656      K 4.2      CO2 28   *   BUN 11   CREA 1.29   CA 9.6   ALB 3.8   TBILI 0.2   SGOT 7*   ALT 19     No results found for: GLUCPOC  No results for input(s): PH, PCO2, PO2, HCO3, FIO2 in the last 72 hours.   No results for input(s): INR in the last 72 hours. No lab exists for component: INREXT  All Micro Results     Procedure Component Value Units Date/Time    CULTURE, URINE [[id number]] Collected:  07/03/19 2246    Order Status:  Completed Specimen:  Urine from Clean catch Updated:  07/04/19 9623          Other pertinent lab: None    Total time spent with patient: 40 min I reviewed chart, notes, data and current medications in the medical record. I have examined and treated the patient at bedside during this period.                  Care Plan discussed with: Patient, Nursing Staff and Consultant/Specialist    Discussed:  Care Plan and D/C Planning    Prophylaxis:  Heparin gtt    Disposition:  Home w/Family           ___________________________________________________    Attending Physician: Aurelio Garcia DO

## 2019-07-04 NOTE — ROUTINE PROCESS
2120  Patient blood pressure improved from [facility] ED. Dr. Camargo Never at bedside and ok to discontinue labetolol. 0451  Patient denies chest pain at this time. States he was starting to diminish since he came from the ER.    0559  Received call from [facility] in the lab for a critical troponin of 5.48. Dr. Camargo Never made aware and advised to notify cardiology. 8678  Notified Dr. Nancy King. No new orders at this time.

## 2019-07-04 NOTE — PROGRESS NOTES
Bedside and Verbal shift change report given to Joel (oncoming nurse) by Ritchie Cannon (offgoing nurse). Report included the following information SBAR, Kardex, Procedure Summary, Intake/Output, MAR, Recent Results and Med Rec Status. 1416: MD notified of continued increased BP even after administering Coreg early. No new orders received at this time. Pt denies any symptoms. 1907: Bedside and Verbal shift change report given to Brigida Womack (oncoming nurse) by Joel (offgoing nurse). Report included the following information SBAR, Kardex, Procedure Summary, Intake/Output, MAR, Recent Results and Med Rec Status.

## 2019-07-05 ENCOUNTER — HOSPITAL ENCOUNTER (INPATIENT)
Age: 64
LOS: 7 days | Discharge: HOME HEALTH | DRG: 236 | End: 2019-07-12
Attending: THORACIC SURGERY (CARDIOTHORACIC VASCULAR SURGERY) | Admitting: THORACIC SURGERY (CARDIOTHORACIC VASCULAR SURGERY)
Payer: COMMERCIAL

## 2019-07-05 ENCOUNTER — APPOINTMENT (OUTPATIENT)
Dept: GENERAL RADIOLOGY | Age: 64
DRG: 236 | End: 2019-07-05
Attending: NURSE PRACTITIONER
Payer: COMMERCIAL

## 2019-07-05 ENCOUNTER — APPOINTMENT (OUTPATIENT)
Dept: NON INVASIVE DIAGNOSTICS | Age: 64
DRG: 281 | End: 2019-07-05
Attending: INTERNAL MEDICINE
Payer: COMMERCIAL

## 2019-07-05 ENCOUNTER — APPOINTMENT (OUTPATIENT)
Dept: VASCULAR SURGERY | Age: 64
DRG: 281 | End: 2019-07-05
Attending: NURSE PRACTITIONER
Payer: COMMERCIAL

## 2019-07-05 VITALS
HEIGHT: 72 IN | BODY MASS INDEX: 24.38 KG/M2 | WEIGHT: 180 LBS | OXYGEN SATURATION: 96 % | TEMPERATURE: 97.8 F | DIASTOLIC BLOOD PRESSURE: 106 MMHG | SYSTOLIC BLOOD PRESSURE: 166 MMHG | HEART RATE: 67 BPM | RESPIRATION RATE: 20 BRPM

## 2019-07-05 DIAGNOSIS — Z95.1 S/P CABG X 3: Primary | ICD-10-CM

## 2019-07-05 PROBLEM — I25.10 CAD (CORONARY ARTERY DISEASE): Status: ACTIVE | Noted: 2019-07-05

## 2019-07-05 LAB
ALBUMIN SERPL-MCNC: 3.7 G/DL (ref 3.5–5)
ALBUMIN/GLOB SERPL: 1.1 {RATIO} (ref 1.1–2.2)
ALP SERPL-CCNC: 89 U/L (ref 45–117)
ALT SERPL-CCNC: 22 U/L (ref 12–78)
ANION GAP SERPL CALC-SCNC: 5 MMOL/L (ref 5–15)
APPEARANCE UR: CLEAR
APTT PPP: 37.3 SEC (ref 22.1–32)
APTT PPP: 53.3 SEC (ref 22.1–32)
ARTERIAL PATENCY WRIST A: YES
AST SERPL-CCNC: 23 U/L (ref 15–37)
ATRIAL RATE: 64 BPM
BACTERIA SPEC CULT: NORMAL
BACTERIA URNS QL MICRO: NEGATIVE /HPF
BASE DEFICIT BLD-SCNC: 1 MMOL/L
BASOPHILS # BLD: 0.1 K/UL (ref 0–0.1)
BASOPHILS NFR BLD: 1 % (ref 0–1)
BDY SITE: NORMAL
BILIRUB SERPL-MCNC: 0.5 MG/DL (ref 0.2–1)
BILIRUB UR QL: NEGATIVE
BNP SERPL-MCNC: 173 PG/ML
BUN SERPL-MCNC: 13 MG/DL (ref 6–20)
BUN/CREAT SERPL: 11 (ref 12–20)
CALCIUM SERPL-MCNC: 8.7 MG/DL (ref 8.5–10.1)
CALCULATED R AXIS, ECG10: 49 DEGREES
CALCULATED T AXIS, ECG11: 58 DEGREES
CC UR VC: NORMAL
CHLORIDE SERPL-SCNC: 107 MMOL/L (ref 97–108)
CO2 SERPL-SCNC: 28 MMOL/L (ref 21–32)
COLOR UR: NORMAL
CREAT SERPL-MCNC: 1.17 MG/DL (ref 0.7–1.3)
DIAGNOSIS, 93000: NORMAL
DIFFERENTIAL METHOD BLD: ABNORMAL
ECHO AO ROOT DIAM: 3.51 CM
ECHO AV AREA PEAK VELOCITY: 2.4 CM2
ECHO AV PEAK GRADIENT: 3.7 MMHG
ECHO AV PEAK VELOCITY: 95.87 CM/S
ECHO EST RA PRESSURE: 8 MMHG
ECHO LA MAJOR AXIS: 3.35 CM
ECHO LA TO AORTIC ROOT RATIO: 0.96
ECHO LA VOL 2C: 45.05 ML (ref 18–58)
ECHO LA VOL 4C: 40.01 ML (ref 18–58)
ECHO LA VOL BP: 52.02 ML (ref 18–58)
ECHO LA VOL/BSA BIPLANE: 25.54 ML/M2 (ref 16–28)
ECHO LA VOLUME INDEX A2C: 22.11 ML/M2 (ref 16–28)
ECHO LA VOLUME INDEX A4C: 19.64 ML/M2 (ref 16–28)
ECHO LV INTERNAL DIMENSION DIASTOLIC: 5.03 CM (ref 4.2–5.9)
ECHO LV INTERNAL DIMENSION SYSTOLIC: 3.82 CM
ECHO LV IVSD: 1.12 CM (ref 0.6–1)
ECHO LV MASS 2D: 198.4 G (ref 88–224)
ECHO LV MASS INDEX 2D: 97.4 G/M2 (ref 49–115)
ECHO LV POSTERIOR WALL DIASTOLIC: 0.77 CM (ref 0.6–1)
ECHO LVOT DIAM: 1.98 CM
ECHO LVOT PEAK GRADIENT: 2.2 MMHG
ECHO LVOT PEAK VELOCITY: 74.55 CM/S
ECHO MV A VELOCITY: 57.39 CM/S
ECHO MV E DECELERATION TIME (DT): 357.7 MS
ECHO MV E VELOCITY: 57.39 CM/S
ECHO MV E/A RATIO: 1
ECHO MV REGURGITANT PEAK GRADIENT: 45 MMHG
ECHO MV REGURGITANT PEAK VELOCITY: 335.47 CM/S
ECHO PULMONARY ARTERY SYSTOLIC PRESSURE (PASP): 30.8 MMHG
ECHO PV MAX VELOCITY: 63.92 CM/S
ECHO PV PEAK GRADIENT: 1.6 MMHG
ECHO RIGHT VENTRICULAR SYSTOLIC PRESSURE (RVSP): 30.8 MMHG
ECHO RV INTERNAL DIMENSION: 3.42 CM
ECHO TV REGURGITANT MAX VELOCITY: 238.59 CM/S
ECHO TV REGURGITANT PEAK GRADIENT: 22.8 MMHG
EOSINOPHIL # BLD: 0.4 K/UL (ref 0–0.4)
EOSINOPHIL NFR BLD: 4 % (ref 0–7)
EPITH CASTS URNS QL MICRO: NORMAL /LPF
ERYTHROCYTE [DISTWIDTH] IN BLOOD BY AUTOMATED COUNT: 15.3 % (ref 11.5–14.5)
EST. AVERAGE GLUCOSE BLD GHB EST-MCNC: 123 MG/DL
GAS FLOW.O2 O2 DELIVERY SYS: NORMAL L/MIN
GLOBULIN SER CALC-MCNC: 3.5 G/DL (ref 2–4)
GLUCOSE SERPL-MCNC: 87 MG/DL (ref 65–100)
GLUCOSE UR STRIP.AUTO-MCNC: NEGATIVE MG/DL
HBA1C MFR BLD: 5.9 % (ref 4.2–6.3)
HCO3 BLD-SCNC: 24.1 MMOL/L (ref 22–26)
HCT VFR BLD AUTO: 41.5 % (ref 36.6–50.3)
HGB BLD-MCNC: 12.9 G/DL (ref 12.1–17)
HGB UR QL STRIP: NEGATIVE
HYALINE CASTS URNS QL MICRO: NORMAL /LPF (ref 0–5)
IMM GRANULOCYTES # BLD AUTO: 0 K/UL (ref 0–0.04)
IMM GRANULOCYTES NFR BLD AUTO: 0 % (ref 0–0.5)
INR PPP: 1.1 (ref 0.9–1.1)
KETONES UR QL STRIP.AUTO: NEGATIVE MG/DL
LEUKOCYTE ESTERASE UR QL STRIP.AUTO: NEGATIVE
LYMPHOCYTES # BLD: 2.5 K/UL (ref 0.8–3.5)
LYMPHOCYTES NFR BLD: 26 % (ref 12–49)
MAGNESIUM SERPL-MCNC: 2.5 MG/DL (ref 1.6–2.4)
MCH RBC QN AUTO: 24.7 PG (ref 26–34)
MCHC RBC AUTO-ENTMCNC: 31.1 G/DL (ref 30–36.5)
MCV RBC AUTO: 79.3 FL (ref 80–99)
MONOCYTES # BLD: 0.7 K/UL (ref 0–1)
MONOCYTES NFR BLD: 8 % (ref 5–13)
NEUTS SEG # BLD: 5.8 K/UL (ref 1.8–8)
NEUTS SEG NFR BLD: 61 % (ref 32–75)
NITRITE UR QL STRIP.AUTO: NEGATIVE
NRBC # BLD: 0 K/UL (ref 0–0.01)
NRBC BLD-RTO: 0 PER 100 WBC
P-R INTERVAL, ECG05: 156 MS
PCO2 BLD: 39.3 MMHG (ref 35–45)
PH BLD: 7.4 [PH] (ref 7.35–7.45)
PH UR STRIP: 7.5 [PH] (ref 5–8)
PLATELET # BLD AUTO: 255 K/UL (ref 150–400)
PMV BLD AUTO: 10.8 FL (ref 8.9–12.9)
PO2 BLD: 87 MMHG (ref 80–100)
POTASSIUM SERPL-SCNC: 4 MMOL/L (ref 3.5–5.1)
PROT SERPL-MCNC: 7.2 G/DL (ref 6.4–8.2)
PROT UR STRIP-MCNC: NEGATIVE MG/DL
PROTHROMBIN TIME: 11.5 SEC (ref 9–11.1)
Q-T INTERVAL, ECG07: 432 MS
QRS DURATION, ECG06: 84 MS
QTC CALCULATION (BEZET), ECG08: 445 MS
RBC # BLD AUTO: 5.23 M/UL (ref 4.1–5.7)
RBC #/AREA URNS HPF: NORMAL /HPF (ref 0–5)
SAO2 % BLD: 97 % (ref 92–97)
SERVICE CMNT-IMP: NORMAL
SODIUM SERPL-SCNC: 140 MMOL/L (ref 136–145)
SP GR UR REFRACTOMETRY: 1.01 (ref 1–1.03)
SPECIMEN TYPE: NORMAL
T4 SERPL-MCNC: 9.4 UG/DL (ref 4.5–12.1)
THERAPEUTIC RANGE,PTTT: ABNORMAL SECS (ref 58–77)
THERAPEUTIC RANGE,PTTT: ABNORMAL SECS (ref 58–77)
TOTAL RESP. RATE, ITRR: 16
TSH SERPL DL<=0.05 MIU/L-ACNC: 0.13 UIU/ML (ref 0.36–3.74)
UA: UC IF INDICATED,UAUC: NORMAL
UROBILINOGEN UR QL STRIP.AUTO: 0.2 EU/DL (ref 0.2–1)
VENTRICULAR RATE, ECG03: 64 BPM
WBC # BLD AUTO: 9.5 K/UL (ref 4.1–11.1)
WBC URNS QL MICRO: NORMAL /HPF (ref 0–4)

## 2019-07-05 PROCEDURE — 77030004532 HC CATH ANGI DX IMP BSC -A: Performed by: INTERNAL MEDICINE

## 2019-07-05 PROCEDURE — 84443 ASSAY THYROID STIM HORMONE: CPT

## 2019-07-05 PROCEDURE — 93306 TTE W/DOPPLER COMPLETE: CPT

## 2019-07-05 PROCEDURE — 83880 ASSAY OF NATRIURETIC PEPTIDE: CPT

## 2019-07-05 PROCEDURE — 74011250637 HC RX REV CODE- 250/637: Performed by: INTERNAL MEDICINE

## 2019-07-05 PROCEDURE — 86900 BLOOD TYPING SEROLOGIC ABO: CPT

## 2019-07-05 PROCEDURE — B2111ZZ FLUOROSCOPY OF MULTIPLE CORONARY ARTERIES USING LOW OSMOLAR CONTRAST: ICD-10-PCS | Performed by: INTERNAL MEDICINE

## 2019-07-05 PROCEDURE — 74011250637 HC RX REV CODE- 250/637: Performed by: THORACIC SURGERY (CARDIOTHORACIC VASCULAR SURGERY)

## 2019-07-05 PROCEDURE — 82803 BLOOD GASES ANY COMBINATION: CPT

## 2019-07-05 PROCEDURE — 74011250636 HC RX REV CODE- 250/636

## 2019-07-05 PROCEDURE — 65660000001 HC RM ICU INTERMED STEPDOWN

## 2019-07-05 PROCEDURE — 83036 HEMOGLOBIN GLYCOSYLATED A1C: CPT

## 2019-07-05 PROCEDURE — 74011250637 HC RX REV CODE- 250/637: Performed by: NURSE PRACTITIONER

## 2019-07-05 PROCEDURE — 4A023N7 MEASUREMENT OF CARDIAC SAMPLING AND PRESSURE, LEFT HEART, PERCUTANEOUS APPROACH: ICD-10-PCS | Performed by: INTERNAL MEDICINE

## 2019-07-05 PROCEDURE — 77030019569 HC BND COMPR RAD TERU -B: Performed by: INTERNAL MEDICINE

## 2019-07-05 PROCEDURE — 36600 WITHDRAWAL OF ARTERIAL BLOOD: CPT

## 2019-07-05 PROCEDURE — 85730 THROMBOPLASTIN TIME PARTIAL: CPT

## 2019-07-05 PROCEDURE — 74011250636 HC RX REV CODE- 250/636: Performed by: INTERNAL MEDICINE

## 2019-07-05 PROCEDURE — 74011636320 HC RX REV CODE- 636/320: Performed by: INTERNAL MEDICINE

## 2019-07-05 PROCEDURE — 77030008543 HC TBNG MON PRSS MRTM -A: Performed by: INTERNAL MEDICINE

## 2019-07-05 PROCEDURE — 74011250636 HC RX REV CODE- 250/636: Performed by: THORACIC SURGERY (CARDIOTHORACIC VASCULAR SURGERY)

## 2019-07-05 PROCEDURE — 99152 MOD SED SAME PHYS/QHP 5/>YRS: CPT | Performed by: INTERNAL MEDICINE

## 2019-07-05 PROCEDURE — 36415 COLL VENOUS BLD VENIPUNCTURE: CPT

## 2019-07-05 PROCEDURE — 86923 COMPATIBILITY TEST ELECTRIC: CPT

## 2019-07-05 PROCEDURE — C1894 INTRO/SHEATH, NON-LASER: HCPCS | Performed by: INTERNAL MEDICINE

## 2019-07-05 PROCEDURE — C1769 GUIDE WIRE: HCPCS | Performed by: INTERNAL MEDICINE

## 2019-07-05 PROCEDURE — 81001 URINALYSIS AUTO W/SCOPE: CPT

## 2019-07-05 PROCEDURE — 85025 COMPLETE CBC W/AUTO DIFF WBC: CPT

## 2019-07-05 PROCEDURE — 93880 EXTRACRANIAL BILAT STUDY: CPT

## 2019-07-05 PROCEDURE — B2151ZZ FLUOROSCOPY OF LEFT HEART USING LOW OSMOLAR CONTRAST: ICD-10-PCS | Performed by: INTERNAL MEDICINE

## 2019-07-05 PROCEDURE — 74011000250 HC RX REV CODE- 250: Performed by: INTERNAL MEDICINE

## 2019-07-05 PROCEDURE — 93458 L HRT ARTERY/VENTRICLE ANGIO: CPT | Performed by: INTERNAL MEDICINE

## 2019-07-05 PROCEDURE — 85610 PROTHROMBIN TIME: CPT

## 2019-07-05 PROCEDURE — 71046 X-RAY EXAM CHEST 2 VIEWS: CPT

## 2019-07-05 PROCEDURE — 99153 MOD SED SAME PHYS/QHP EA: CPT | Performed by: INTERNAL MEDICINE

## 2019-07-05 PROCEDURE — 77030029065 HC DRSG HEMO QCLOT ZMED -B

## 2019-07-05 PROCEDURE — 80053 COMPREHEN METABOLIC PANEL: CPT

## 2019-07-05 PROCEDURE — C1887 CATHETER, GUIDING: HCPCS | Performed by: INTERNAL MEDICINE

## 2019-07-05 PROCEDURE — 83735 ASSAY OF MAGNESIUM: CPT

## 2019-07-05 RX ORDER — SODIUM CHLORIDE 0.9 % (FLUSH) 0.9 %
5-40 SYRINGE (ML) INJECTION EVERY 8 HOURS
Status: CANCELLED | OUTPATIENT
Start: 2019-07-05

## 2019-07-05 RX ORDER — CARVEDILOL 6.25 MG/1
6.25 TABLET ORAL 2 TIMES DAILY WITH MEALS
Status: CANCELLED | OUTPATIENT
Start: 2019-07-05

## 2019-07-05 RX ORDER — FENTANYL CITRATE 50 UG/ML
INJECTION, SOLUTION INTRAMUSCULAR; INTRAVENOUS AS NEEDED
Status: DISCONTINUED | OUTPATIENT
Start: 2019-07-05 | End: 2019-07-05 | Stop reason: HOSPADM

## 2019-07-05 RX ORDER — LOSARTAN POTASSIUM 25 MG/1
25 TABLET ORAL DAILY
Status: DISCONTINUED | OUTPATIENT
Start: 2019-07-05 | End: 2019-07-05 | Stop reason: HOSPADM

## 2019-07-05 RX ORDER — MIDAZOLAM HYDROCHLORIDE 1 MG/ML
INJECTION, SOLUTION INTRAMUSCULAR; INTRAVENOUS AS NEEDED
Status: DISCONTINUED | OUTPATIENT
Start: 2019-07-05 | End: 2019-07-05 | Stop reason: HOSPADM

## 2019-07-05 RX ORDER — HEPARIN SODIUM 10000 [USP'U]/100ML
11.7-25 INJECTION, SOLUTION INTRAVENOUS
Status: CANCELLED | OUTPATIENT
Start: 2019-07-05

## 2019-07-05 RX ORDER — AMIODARONE HYDROCHLORIDE 200 MG/1
400 TABLET ORAL EVERY 12 HOURS
Status: DISCONTINUED | OUTPATIENT
Start: 2019-07-06 | End: 2019-07-08 | Stop reason: HOSPADM

## 2019-07-05 RX ORDER — CARVEDILOL 6.25 MG/1
6.25 TABLET ORAL 2 TIMES DAILY WITH MEALS
Status: DISCONTINUED | OUTPATIENT
Start: 2019-07-06 | End: 2019-07-06

## 2019-07-05 RX ORDER — CARVEDILOL 6.25 MG/1
6.25 TABLET ORAL 2 TIMES DAILY WITH MEALS
Status: DISCONTINUED | OUTPATIENT
Start: 2019-07-05 | End: 2019-07-05

## 2019-07-05 RX ORDER — VERAPAMIL HYDROCHLORIDE 2.5 MG/ML
INJECTION, SOLUTION INTRAVENOUS AS NEEDED
Status: DISCONTINUED | OUTPATIENT
Start: 2019-07-05 | End: 2019-07-05 | Stop reason: HOSPADM

## 2019-07-05 RX ORDER — SODIUM CHLORIDE 0.9 % (FLUSH) 0.9 %
5-40 SYRINGE (ML) INJECTION AS NEEDED
Status: DISCONTINUED | OUTPATIENT
Start: 2019-07-05 | End: 2019-07-05 | Stop reason: HOSPADM

## 2019-07-05 RX ORDER — ATORVASTATIN CALCIUM 20 MG/1
40 TABLET, FILM COATED ORAL DAILY
Status: CANCELLED | OUTPATIENT
Start: 2019-07-06

## 2019-07-05 RX ORDER — HEPARIN SODIUM 1000 [USP'U]/ML
4000 INJECTION, SOLUTION INTRAVENOUS; SUBCUTANEOUS ONCE
Status: COMPLETED | OUTPATIENT
Start: 2019-07-05 | End: 2019-07-05

## 2019-07-05 RX ORDER — CEFAZOLIN SODIUM/WATER 2 G/20 ML
2 SYRINGE (ML) INTRAVENOUS
Status: DISCONTINUED | OUTPATIENT
Start: 2019-07-08 | End: 2019-07-08 | Stop reason: HOSPADM

## 2019-07-05 RX ORDER — SODIUM CHLORIDE 0.9 % (FLUSH) 0.9 %
5-40 SYRINGE (ML) INJECTION AS NEEDED
Status: DISCONTINUED | OUTPATIENT
Start: 2019-07-05 | End: 2019-07-08 | Stop reason: HOSPADM

## 2019-07-05 RX ORDER — LIDOCAINE HYDROCHLORIDE 10 MG/ML
INJECTION INFILTRATION; PERINEURAL AS NEEDED
Status: DISCONTINUED | OUTPATIENT
Start: 2019-07-05 | End: 2019-07-05 | Stop reason: HOSPADM

## 2019-07-05 RX ORDER — HEPARIN SODIUM 10000 [USP'U]/100ML
11-25 INJECTION, SOLUTION INTRAVENOUS
Status: DISPENSED | OUTPATIENT
Start: 2019-07-05 | End: 2019-07-08

## 2019-07-05 RX ORDER — CHLORHEXIDINE GLUCONATE 1.2 MG/ML
15 RINSE ORAL EVERY 12 HOURS
Status: CANCELLED | OUTPATIENT
Start: 2019-07-05

## 2019-07-05 RX ORDER — ACETAMINOPHEN 325 MG/1
650 TABLET ORAL
Status: DISCONTINUED | OUTPATIENT
Start: 2019-07-05 | End: 2019-07-05 | Stop reason: SDUPTHER

## 2019-07-05 RX ORDER — MESALAMINE 800 MG/1
800 TABLET, DELAYED RELEASE ORAL DAILY
Refills: 1 | COMMUNITY
Start: 2019-04-04

## 2019-07-05 RX ORDER — SODIUM CHLORIDE 0.9 % (FLUSH) 0.9 %
5-40 SYRINGE (ML) INJECTION EVERY 8 HOURS
Status: DISCONTINUED | OUTPATIENT
Start: 2019-07-05 | End: 2019-07-08 | Stop reason: HOSPADM

## 2019-07-05 RX ORDER — MESALAMINE 800 MG/1
800 TABLET, DELAYED RELEASE ORAL DAILY
Status: DISCONTINUED | OUTPATIENT
Start: 2019-07-06 | End: 2019-07-08

## 2019-07-05 RX ORDER — HEPARIN SODIUM 10000 [USP'U]/100ML
12-25 INJECTION, SOLUTION INTRAVENOUS
Status: DISCONTINUED | OUTPATIENT
Start: 2019-07-05 | End: 2019-07-05 | Stop reason: SDUPTHER

## 2019-07-05 RX ORDER — GUAIFENESIN 100 MG/5ML
81 LIQUID (ML) ORAL DAILY
Status: CANCELLED | OUTPATIENT
Start: 2019-07-06

## 2019-07-05 RX ORDER — PANTOPRAZOLE SODIUM 40 MG/1
40 TABLET, DELAYED RELEASE ORAL
Status: DISCONTINUED | OUTPATIENT
Start: 2019-07-06 | End: 2019-07-08

## 2019-07-05 RX ORDER — SODIUM CHLORIDE 0.9 % (FLUSH) 0.9 %
5-40 SYRINGE (ML) INJECTION AS NEEDED
Status: CANCELLED | OUTPATIENT
Start: 2019-07-05

## 2019-07-05 RX ORDER — CEFAZOLIN SODIUM/WATER 2 G/20 ML
2 SYRINGE (ML) INTRAVENOUS
Status: CANCELLED | OUTPATIENT
Start: 2019-07-08 | End: 2019-07-09

## 2019-07-05 RX ORDER — CHLORHEXIDINE GLUCONATE 1.2 MG/ML
15 RINSE ORAL EVERY 12 HOURS
Status: DISCONTINUED | OUTPATIENT
Start: 2019-07-05 | End: 2019-07-08

## 2019-07-05 RX ORDER — AMIODARONE HYDROCHLORIDE 200 MG/1
400 TABLET ORAL EVERY 12 HOURS
Status: CANCELLED | OUTPATIENT
Start: 2019-07-05

## 2019-07-05 RX ORDER — SODIUM CHLORIDE 9 MG/ML
50 INJECTION, SOLUTION INTRAVENOUS CONTINUOUS
Status: DISCONTINUED | OUTPATIENT
Start: 2019-07-05 | End: 2019-07-05 | Stop reason: HOSPADM

## 2019-07-05 RX ORDER — HEPARIN SODIUM 200 [USP'U]/100ML
INJECTION, SOLUTION INTRAVENOUS
Status: COMPLETED | OUTPATIENT
Start: 2019-07-05 | End: 2019-07-05

## 2019-07-05 RX ORDER — MUPIROCIN 20 MG/G
OINTMENT TOPICAL 2 TIMES DAILY
Status: DISCONTINUED | OUTPATIENT
Start: 2019-07-06 | End: 2019-07-06

## 2019-07-05 RX ORDER — AMIODARONE HYDROCHLORIDE 200 MG/1
400 TABLET ORAL EVERY 12 HOURS
Status: DISCONTINUED | OUTPATIENT
Start: 2019-07-05 | End: 2019-07-05

## 2019-07-05 RX ORDER — HYDRALAZINE HYDROCHLORIDE 20 MG/ML
20 INJECTION INTRAMUSCULAR; INTRAVENOUS
Status: DISCONTINUED | OUTPATIENT
Start: 2019-07-05 | End: 2019-07-08

## 2019-07-05 RX ORDER — ATORVASTATIN CALCIUM 40 MG/1
40 TABLET, FILM COATED ORAL DAILY
Status: DISCONTINUED | OUTPATIENT
Start: 2019-07-06 | End: 2019-07-06

## 2019-07-05 RX ORDER — GUAIFENESIN 100 MG/5ML
81 LIQUID (ML) ORAL DAILY
Status: DISCONTINUED | OUTPATIENT
Start: 2019-07-06 | End: 2019-07-06

## 2019-07-05 RX ORDER — HEPARIN SODIUM 1000 [USP'U]/ML
INJECTION, SOLUTION INTRAVENOUS; SUBCUTANEOUS AS NEEDED
Status: DISCONTINUED | OUTPATIENT
Start: 2019-07-05 | End: 2019-07-05 | Stop reason: HOSPADM

## 2019-07-05 RX ORDER — SODIUM CHLORIDE 0.9 % (FLUSH) 0.9 %
5-40 SYRINGE (ML) INJECTION EVERY 8 HOURS
Status: DISCONTINUED | OUTPATIENT
Start: 2019-07-05 | End: 2019-07-05 | Stop reason: HOSPADM

## 2019-07-05 RX ORDER — PANTOPRAZOLE SODIUM 40 MG/1
40 TABLET, DELAYED RELEASE ORAL
Status: CANCELLED | OUTPATIENT
Start: 2019-07-06

## 2019-07-05 RX ADMIN — ALUMINUM HYDROXIDE AND MAGNESIUM HYDROXIDE 15 ML: 200; 200 SUSPENSION ORAL at 11:44

## 2019-07-05 RX ADMIN — LOSARTAN POTASSIUM 25 MG: 25 TABLET ORAL at 14:58

## 2019-07-05 RX ADMIN — FAMOTIDINE 20 MG: 10 INJECTION, SOLUTION INTRAVENOUS at 11:44

## 2019-07-05 RX ADMIN — Medication 10 ML: at 11:46

## 2019-07-05 RX ADMIN — PANTOPRAZOLE SODIUM 40 MG: 40 TABLET, DELAYED RELEASE ORAL at 11:48

## 2019-07-05 RX ADMIN — ASPIRIN 81 MG 81 MG: 81 TABLET ORAL at 11:45

## 2019-07-05 RX ADMIN — CARVEDILOL 6.25 MG: 6.25 TABLET, FILM COATED ORAL at 11:45

## 2019-07-05 RX ADMIN — HEPARIN SODIUM AND DEXTROSE 14.7 UNITS/KG/HR: 10000; 5 INJECTION INTRAVENOUS at 04:51

## 2019-07-05 RX ADMIN — CARVEDILOL 6.25 MG: 6.25 TABLET, FILM COATED ORAL at 23:16

## 2019-07-05 RX ADMIN — HEPARIN SODIUM 4000 UNITS: 1000 INJECTION INTRAVENOUS; SUBCUTANEOUS at 19:16

## 2019-07-05 RX ADMIN — ATORVASTATIN CALCIUM 40 MG: 20 TABLET, FILM COATED ORAL at 11:44

## 2019-07-05 RX ADMIN — Medication 10 ML: at 23:17

## 2019-07-05 RX ADMIN — AMIODARONE HYDROCHLORIDE 400 MG: 200 TABLET ORAL at 23:16

## 2019-07-05 NOTE — PROGRESS NOTES
Cardiac Surgery Care Coordinator-  Met with Elizabeth Pearl, Introduced role of the Cardiac Surgery Care Coordinator. Reviewed plan of care and began pre-op education. Discussed day of surgery expectations for the pt and family. Mr Jeff Oleary stated he does not have family and is not expecting anyone to be at the hospital on Monday. Encouraged Elizabeth Pearl  to verbalize and offered emotional support.  Navi Sarah RN

## 2019-07-05 NOTE — PROGRESS NOTES
0200- patient resting comfortably in bed- AxOx4 with no reports of pain. Heparin drip @ 13.7u/kg/hr. Reminded patient that he is NPO for cath this morning, he agrees. 0300- pt sleeping    0400- lab work drawn and sent to lab    3108- Bedside shift change report given to Segundo Kothari (oncoming nurse) by Deion Aguirre (offgoing nurse). Report included the following information SBAR, Kardex, Intake/Output, MAR, Accordion and Recent Results.

## 2019-07-05 NOTE — PROGRESS NOTES
Patient currently in [address] [place] [address]. Echo will be attempted later. Will follow.

## 2019-07-05 NOTE — PROGRESS NOTES
7-5-2019 Reason for Admission:   Chest Pain                   RRAT Score: 6                    Plan for utilizing home health:  TBD                        Current Advanced Directive/Advance Care Plan: Full Code                         Transition of Care Plan:     1. Transfer to [facility] when bed available  2. Further discharge plans depend on needs after surgery    I met with the pt to determine potential discharge needs. He lives alone in a one level house with 3 entry steps. He is independent with his ADL's, uses no assistive devices, works full-time and drives. He said he travels for his work and uses various [facility] as his PCP. His only contact is his ex-wife, Manpreet Guaman ([phone]), and said he has no contact with family. He has prescription drug coverage and gets his medications from [facility] on [address]  In [place]. He is scheduled to be transferred to [facility] when a bed is available for cardiac surgery. The CM there will evaluate for any discharge needs. Care Management Interventions  PCP Verified by CM: Yes(No PCP-uses various [facility] locations as travels on his job)  Discharge Durable Medical Equipment: No  Physical Therapy Consult: No  Occupational Therapy Consult: No  Speech Therapy Consult: No  Current Support Network: Lives Alone, Own Home  Plan discussed with Pt/Family/Caregiver: Yes  Discharge Location  Discharge Placement: ([facility])    Chris Franz, [address], [place]

## 2019-07-05 NOTE — PROCEDURES
BRIEF PROCEDURE NOTE    Date of Procedure: 7/5/2019   Preoperative Diagnosis: NSTEMI  Postoperative Diagnosis:  Multivessel dz/L M dz  Procedure: Left heart cath, LV angiography, coronary angiography  Interventional Cardiologist: Laverta Frankel, MD  Anesthesia: local + IV sedation  Estimated Blood Loss: Minimal  Findings:     R radial access    Tortuous R SCV    L Main: Large; Distal 60% at trifurcation    LAD: Med; Prox 60%; haziness - thrombus vs Ca+ ; Small D1/ Small to med D2/D3; L to R collaterals present    LCflex: Med; Nml Mid 60%    RCA:  Med size; prox 70%; Distal 50%; PDA and PLB - MLI    LVEDP: Nml    LVEF: Not assessed/Difficulty getting into LV sec to tortuous R SCV    No significant gradient across aortic valve. Closure Device: TR band        See full cath note.     Complications: none    Laverta Frankel, MD

## 2019-07-05 NOTE — PROGRESS NOTES
FRAN completed- on chart  Transfer initiated by CT surgery  trx orders complete  Currently no beds available at [address] [place] [address], will continue to follow up throughout the day

## 2019-07-05 NOTE — PROGRESS NOTES
0800 Bedside and Verbal shift change report given to [address] (oncoming nurse) by Marilyn Burrows (offgoing nurse). Report included the following information SBAR, Kardex, Procedure Summary, Accordion and Recent Results Cardiac Rhythm NSR.     0845 CHG  pt to cath lab    1100 Pt returned from cath lab, report received. Hemostasis at 1030. Radial site dry,intact, no hemotoma Vitals taken    1230 restart heparin drip    1400 Francisca from access, bed at [facility]  1440 report given to transport and Jose Garibay at [address]:    Verbal report given to Jose Garibay RN(name) on Karla Buck  being transferred to [facility] 469(unit) for routine progression of care       Report consisted of patients Situation, Background, Assessment and   Recommendations(SBAR). Information from the following report(s) SBAR, Kardex, Procedure Summary, MAR, Recent Results and Cardiac Rhythm NSR was reviewed with the receiving nurse. Lines:   Peripheral IV 07/03/19 Right Antecubital (Active)   Site Assessment Clean, dry, & intact 7/5/2019  8:25 AM   Phlebitis Assessment 0 7/5/2019  8:25 AM   Infiltration Assessment 0 7/5/2019  8:25 AM   Dressing Status Clean, dry, & intact 7/5/2019  8:25 AM   Dressing Type Transparent 7/5/2019  1:30 AM   Hub Color/Line Status Pink 7/5/2019  8:25 AM   Action Taken Open ports on tubing capped 7/5/2019  1:30 AM   Alcohol Cap Used Yes 7/5/2019  1:30 AM       Peripheral IV 07/04/19 Left Forearm (Active)   Site Assessment Clean, dry, & intact 7/5/2019  8:25 AM   Phlebitis Assessment 0 7/5/2019  8:25 AM   Infiltration Assessment 0 7/5/2019  8:25 AM   Dressing Status Clean, dry, & intact 7/5/2019  8:25 AM   Dressing Type Transparent 7/5/2019  1:30 AM   Hub Color/Line Status Pink 7/5/2019  8:25 AM   Action Taken Open ports on tubing capped 7/5/2019  1:30 AM   Alcohol Cap Used Yes 7/5/2019  1:30 AM        Opportunity for questions and clarification was provided.       Patient transported with: [facility]    2930 Losartan given for /106  1500 EMS departed with pt on Heparin Drip

## 2019-07-05 NOTE — PROGRESS NOTES
TRANSFER - IN REPORT:    Verbal report received from Tia (name) on Toro Franco  being received from [facility]) for routine progression of care      Report consisted of patients Situation, Background, Assessment and   Recommendations(SBAR). Information from the following report(s) SBAR, Kardex, ED Summary, Procedure Summary, Intake/Output, MAR, Accordion, Recent Results, Med Rec Status and Cardiac Rhythm NSR  was reviewed with the receiving nurse. Opportunity for questions and clarification was provided. Assessment completed upon patients arrival to unit and care assumed. Bed in low position, call bell in reach, ambulated to bathroom and placed on telemetry    Problem: Patient Education: Go to Patient Education Activity  Goal: Patient/Family Education  Outcome: Progressing Towards Goal     Problem: Unstable angina/NSTEMI: Day 2  Goal: Off Pathway (Use only if patient is Off Pathway)  Outcome: Progressing Towards Goal  Goal: Activity/Safety  Outcome: Progressing Towards Goal  Goal: Consults, if ordered  Outcome: Progressing Towards Goal  Goal: Diagnostic Test/Procedures  Outcome: Progressing Towards Goal     Problem: Patient Education: Go to Patient Education Activity  Goal: Patient/Family Education  Outcome: Progressing Towards Goal    1533 In reading patients chart, the Heparin gtt was adjusted according to patient's daily weight. It is now using 85.4 kg.     1830 Per Stevie Night in [place] [address] continue to use the weight base of 85.4 kg that [facility] was using. Pharm will adjust in [facility]    1915 Per Chidi Night in [place] [address], they would like the change in Heparin rate to go to 19 units/kg/hr. 1930 Bedside and Verbal shift change report given to Preeti Feng (oncoming nurse) by Luis Alberto Wang (offgoing nurse). Report included the following information SBAR, Kardex, Procedure Summary, Intake/Output, MAR, Accordion, Recent Results, Med Rec Status and Cardiac Rhythm NSR.

## 2019-07-05 NOTE — PROGRESS NOTES
[facility]    Medical Progress Note      NAME: Alysha Young   :  1955  MRM:  [id number]    Date/Time: 2019  10:48 AM          Assessment and Plan:     Principal Problem:    NSTEMI (non-ST elevated myocardial infarction) / CP. Trop spiked overnight and ruled in. Cardiology consult for [facility]. Start heparin gtt protocol. Given ASA + loading plavix in ED. ASA alone for now (in case CABG). Check echo. Start coreg for BP and NSTEMI. FLP with LDL at 90, start statin. LHC: 3v dz  - CT Surgery eval  - transfer to [facility] underway      Active Problems:    GERD (gastroesophageal reflux disease) (). Cont PPI and added H2B      Ulcerative colitis, chronic (HCC) (). He is on a version of mesalamine but not sure which      Elevated blood pressure reading (7/3/2019). Likely has HTN, BP improving after nitro dose. Start low dose coreg and monitor    DVT ppx    Full Code       Subjective:     Chief Complaint:  Patient seen and examined. Chart reviewed. Patient reports no further episodes of chest pain. ROS:  (bold if positive, if negative)    Tolerating PT  Tolerating Diet        Objective:     Last 24hrs VS reviewed since prior progress note.  Most recent are:    Visit Vitals  BP (!) 153/93 (BP 1 Location: Left arm, BP Patient Position: At rest)   Pulse 68   Temp 97.9 °F (36.6 °C)   Resp 19   Ht 6' (1.829 m)   Wt 81.6 kg (180 lb)   SpO2 98%   BMI 24.41 kg/m²     SpO2 Readings from Last 6 Encounters:   19 98%          No intake or output data in the 24 hours ending 19 1442     Physical Exam:    Gen:  Well-developed, well-nourished, in no acute distress  HEENT:  Pink conjunctivae, PERRL, hearing intact to voice, moist mucous membranes  Neck:  Supple, without masses, thyroid non-tender  Resp:  No accessory muscle use, clear breath sounds without wheezes rales or rhonchi  Card:  No murmurs, normal S1, S2 without thrills, bruits or peripheral edema  Abd:  Soft, non-tender, non-distended, normoactive bowel sounds are present, no palpable organomegaly and no detectable hernias  Lymph:  No cervical or inguinal adenopathy  Musc:  No cyanosis or clubbing  Skin:  No rashes or ulcers, skin turgor is good  Neuro:  Cranial nerves are grossly intact, no focal motor weakness, follows commands appropriately  Psych:  Good insight, oriented to person, place and time, alert    Telemetry reviewed:   normal sinus rhythm  __________________________________________________________________  Medications Reviewed: (see below)  Medications:     Current Facility-Administered Medications   Medication Dose Route Frequency    sodium chloride (NS) flush 5-40 mL  5-40 mL IntraVENous Q8H    sodium chloride (NS) flush 5-40 mL  5-40 mL IntraVENous PRN    0.9% sodium chloride infusion  75 mL/hr IntraVENous CONTINUOUS    aspirin chewable tablet 81 mg  81 mg Oral DAILY    heparin 25,000 units in D5W 250 ml infusion  11.7-25 Units/kg/hr IntraVENous TITRATE    atorvastatin (LIPITOR) tablet 40 mg  40 mg Oral DAILY    pantoprazole (PROTONIX) tablet 40 mg  40 mg Oral ACB    carvedilol (COREG) tablet 6.25 mg  6.25 mg Oral BID WITH MEALS    sodium chloride (NS) flush 5-40 mL  5-40 mL IntraVENous Q8H    sodium chloride (NS) flush 5-40 mL  5-40 mL IntraVENous PRN    naloxone (NARCAN) injection 0.4 mg  0.4 mg IntraVENous PRN    zolpidem (AMBIEN) tablet 5 mg  5 mg Oral QHS PRN    acetaminophen (TYLENOL) tablet 650 mg  650 mg Oral Q4H PRN    diphenhydrAMINE (BENADRYL) capsule 25 mg  25 mg Oral Q4H PRN    ondansetron (ZOFRAN) injection 4 mg  4 mg IntraVENous Q4H PRN    famotidine (PF) (PEPCID) 20 mg in sodium chloride 0.9% 10 mL injection  20 mg IntraVENous Q12H    aluminum-magnesium hydroxide (MAALOX) oral suspension 15 mL  15 mL Oral TID WITH MEALS        Lab Data Reviewed: (see below)  Lab Review:     Recent Labs     07/04/19  0804 07/03/19  1656   WBC 10.9 9.6   HGB 12.7 13.3   HCT 39.2 40.4    276 Recent Labs     07/03/19  1656      K 4.2      CO2 28   *   BUN 11   CREA 1.29   CA 9.6   ALB 3.8   TBILI 0.2   SGOT 7*   ALT 19     No results found for: GLUCPOC  No results for input(s): PH, PCO2, PO2, HCO3, FIO2 in the last 72 hours. No results for input(s): INR in the last 72 hours. No lab exists for component: INREXT, INREXT  All Micro Results     Procedure Component Value Units Date/Time    CULTURE, URINE [[id number]] Collected:  07/03/19 2246    Order Status:  Completed Specimen:  Urine from Clean catch Updated:  07/05/19 0955     Special Requests: NO SPECIAL REQUESTS        [place] Count <1,000 CFU/ML        Culture result: NO GROWTH 1 DAY             Other pertinent lab: None    Total time spent with patient: 40 min I reviewed chart, notes, data and current medications in the medical record. I have examined and treated the patient at bedside during this period. Care Plan discussed with: Patient, Nursing Staff and Consultant/Specialist    Discussed:  Care Plan and D/C Planning    Prophylaxis:  Heparin gtt    Disposition:  [facility]            ___________________________________________________    Attending Physician: Senthil Bonilla MD

## 2019-07-05 NOTE — PROGRESS NOTES
Gal Acosta NP working on [employer] for transfer to [facility]. Notified [employer] and spoke to Mendy Crawford of need to transfer for CABG procedure not available at this facility. Dr. Rolando Graf is the accepting physician. Bed request has been initiated; hope to have a bed by 1600.

## 2019-07-05 NOTE — PROGRESS NOTES
Problem: Unstable angina/NSTEMI: Day of Admission/Day 1  Goal: Activity/Safety  Outcome: Progressing Towards Goal  Goal: Consults, if ordered  Outcome: Progressing Towards Goal  Goal: Diagnostic Test/Procedures  Outcome: Progressing Towards Goal  Goal: Nutrition/Diet  Outcome: Progressing Towards Goal  Goal: Discharge Planning  Outcome: Progressing Towards Goal  Goal: Medications  Outcome: Progressing Towards Goal  Goal: Respiratory  Outcome: Progressing Towards Goal  Goal: Treatments/Interventions/Procedures  Outcome: Progressing Towards Goal  Goal: Psychosocial  Outcome: Progressing Towards Goal  Goal: *Hemodynamically stable  Outcome: Progressing Towards Goal  Goal: *Optimal pain control at patient's stated goal  Outcome: Progressing Towards Goal  Goal: *Lungs clear or at baseline  Outcome: Progressing Towards Goal     Problem: Unstable Angina/NSTEMI: Discharge Outcomes  Goal: *Hemodynamically stable  Outcome: Progressing Towards Goal  Goal: *Stable cardiac rhythm  Outcome: Progressing Towards Goal  Goal: *Lungs clear or at baseline  Outcome: Progressing Towards Goal  Goal: *Optimal pain control at patient's stated goal  Outcome: Progressing Towards Goal  Goal: *Identifies cardiac risk factors  Outcome: Progressing Towards Goal  Goal: *Verbalizes home exercise program, activity guidelines, cardiac precautions  Outcome: Progressing Towards Goal  Goal: *Verbalizes understanding and describes prescribed diet  Outcome: Progressing Towards Goal  Goal: *Verbalizes name, dosage, time, side effects, and number of days to continue medications  Outcome: Progressing Towards Goal  Goal: *Anxiety reduced or absent  Outcome: Progressing Towards Goal  Goal: *Understands and describes signs and symptoms to report to providers(Stroke Metric)  Outcome: Progressing Towards Goal  Goal: *Describes available resources and support systems  Outcome: Progressing Towards Goal

## 2019-07-05 NOTE — PROGRESS NOTES
Spiritual Care Assessment/Progress Note  [address]      NAME: Micheal Galicia      MRN: [id number]  AGE: 58 y.o. SEX: male  Rastafarian Affiliation: No preference   Language: English     7/5/2019     Total Time (in minutes): 5     Spiritual Assessment begun in [facility] PACU through conversation with:         []Patient        [] Family    [] Friend(s)        Reason for Consult: Initial/Spiritual assessment, patient floor, Request by staff     Spiritual beliefs: (Please include comment if needed)     [] Identifies with a michael tradition:         [] Supported by a michael community:            [] Claims no spiritual orientation:           [] Seeking spiritual identity:                [] Adheres to an individual form of spirituality:           [x] Not able to assess:                           Identified resources for coping:      [] Prayer                               [] Music                  [] Guided Imagery     [] Family/friends                 [] Pet visits     [] Devotional reading                         [x] Unknown     [] Other:                                               Interventions offered during this visit: (See comments for more details)                Plan of Care:     [] Support spiritual and/or cultural needs    [] Support AMD and/or advance care planning process      [] Support grieving process   [] Coordinate Rites and/or Rituals    [] Coordination with community clergy   [] No spiritual needs identified at this time   [] Detailed Plan of Care below (See Comments)  [] Make referral to Music Therapy  [] Make referral to Pet Therapy     [] Make referral to Addiction services  [] Make referral to [facility]  [] Make referral to Spiritual Care Partner  [] No future visits requested        [x] Follow up visits as needed     Comments:  visit for initial spiritual assessment, one-time  visit requested by staff.   Patient just returned from [address] [place] [address], staff providing care and getting him settled. Will continue to follow up as needed and upon request as able. Visited by Rev. Abril Lewis MDiv, [facility], [facility] paging service: [phone])

## 2019-07-05 NOTE — PROGRESS NOTES
0920    TRANSFER - IN REPORT:    Verbal report received from [facility] JOSEPH SEBASTIAN(name) on [facility]  being received from  [facility] (unit) for routine progression of care      Report consisted of patients Situation, Background, Assessment and   Recommendations(SBAR). Information from the following report(s) SBAR was reviewed with the receiving nurse. Opportunity for questions and clarification was provided. Assessment completed upon patients arrival to unit and care assumed. 1020    2 ml air released from TR Band. No bleeding or hematoma noted. Radial and Ulnar pulse on right  wrist palpable. Pt tolerated well. Will continue to monitor. 1025    3 ml air released from TR Band. No bleeding or hematoma noted. Radial and Ulnar pulse on right  wrist palpable. Pt tolerated well. Will continue to monitor. 1030    3 ml air released from TR Band. No bleeding or hematoma noted. Radial and Ulnar pulse on right  wrist palpable. Pt tolerated well. Will continue to monitor. Air release completed. TR Band removed from right  wrist. No bleeding or  Hematoma. Dressing applied. Wrist immobilizer in place. Radial and ulnar pulse remain palpable on affected extremity. Pt tolerated well. Instructions given to pt regarding movement and activity restrictions. Pt voiced understanding. Echo at bedside pre surgery     1057    TRANSFER - OUT REPORT:    Verbal report given to Tiki SEBASTIAN [facility] (name) on [facility]  being transferred to  [facility] (unit) for routine progression of care       Report consisted of patients Situation, Background, Assessment and   Recommendations(SBAR). Information from the following report(s) SBAR was reviewed with the receiving nurse.     Lines:   Peripheral IV 07/03/19 Right Antecubital (Active)   Site Assessment Clean, dry, & intact 7/5/2019  8:25 AM   Phlebitis Assessment 0 7/5/2019  8:25 AM   Infiltration Assessment 0 7/5/2019  8:25 AM   Dressing Status Clean, dry, & intact 7/5/2019  8:25 AM   Dressing Type Transparent 7/5/2019  1:30 AM   Hub Color/Line Status Pink 7/5/2019  8:25 AM   Action Taken Open ports on tubing capped 7/5/2019  1:30 AM   Alcohol Cap Used Yes 7/5/2019  1:30 AM       Peripheral IV 07/04/19 Left Forearm (Active)   Site Assessment Clean, dry, & intact 7/5/2019  8:25 AM   Phlebitis Assessment 0 7/5/2019  8:25 AM   Infiltration Assessment 0 7/5/2019  8:25 AM   Dressing Status Clean, dry, & intact 7/5/2019  8:25 AM   Dressing Type Transparent 7/5/2019  1:30 AM   Hub Color/Line Status Pink 7/5/2019  8:25 AM   Action Taken Open ports on tubing capped 7/5/2019  1:30 AM   Alcohol Cap Used Yes 7/5/2019  1:30 AM        Opportunity for questions and clarification was provided.       Patient transported with:   Registered Nurse

## 2019-07-05 NOTE — PROGRESS NOTES
D/w Dr Jv Amador   He will be accepting physician for transfer to [address] [place] [address].  MD, [facility]

## 2019-07-05 NOTE — CONSULTS
CSS   History and Physical    Subjective: Britt Nath is a 58 y.o. male who was referred for cardiac evaluation of coronary artery disease, referred by Dr. Lavetta Aschoff. Pt's PMH includes GERD, ulcerative colitis. Pt [facility] to [facility] with chief complaint of chest pain that started one day prior. It started after pt got up from working at the computer. Described as moderate in severity, pressure like, tried tums without relief. No associated symptoms. Pt drove himself to the hospital, where the pain had resolved. In the ER, there were no ischemic changes in EKG and troponin peaked at 5. Pt had cardiac cath today, results below. Pt is former tobacco user, quit 25 years ago, denies alcohol use. Denies drug use. No known family history of cardiac disease. Pt lives in [facility] independently. He denies have any close support system. He had large dogs he is concerned about getting their walks. Of note, pt did receive plavix load on 7/3 in ER. Cardiac Testing    Cardiac catheterization 7/5/19:  Tortuous R SCV     L Main: Large; Distal 60% at trifurcation     LAD: Med; Prox 60%; haziness - thrombus vs Ca+ ; Small D1/ Small to med D2/D3; L to R collaterals present     LCflex: Med; Nml Mid 60%     RCA:  Med size; prox 70%; Distal 50%; PDA and PLB - MLI     LVEDP: Nml     LVEF: Not assessed/Difficulty getting into LV sec to tortuous R SCV     No significant gradient across aortic valve. TTE 7/5/19: Left Ventricle: Normal cavity size, systolic function (ejection fraction normal) and diastolic function. Mildly increased wall thickness. Estimated left ventricular ejection fraction is 56 - 60%. No regional wall motion abnormality noted. Past Medical History:   Diagnosis Date    GERD (gastroesophageal reflux disease)     Ulcerative colitis, chronic (HCC)      No past surgical history on file.    Social History     Tobacco Use    Smoking status: Former Smoker    Smokeless tobacco: Never Used   Substance Use Topics    Alcohol use: Yes      Family History   Problem Relation Age of Onset    GERD Mother     Inflammatory Bowel Dz Mother     Inflammatory Bowel Dz Father      Prior to Admission medications    Medication Sig Start Date End Date Taking? Authorizing Provider   ASPIRIN PO Take 1 Tab by mouth daily. Frank, MD Gifty   omeprazole (PRILOSEC) 40 mg capsule Take 40 mg by mouth daily. Gifty Marie MD       No Known Allergies      Review of Systems:  Pertinent positives in HPI   Consititutional: Denies fever or chills. Eyes:  Denies use of glasses or vision problems(cataracts). ENT:  Denies hearing or swallowing difficulty. CV: Denies CP, claudication, HTN. Resp: Denies dyspnea, productive cough. : Denies dialysis or kidney problems. GI: Denies ulcers, esophageal strictures, liver problems. M/S: Denies joint or bone problems, or implanted artificial hardware. Skin: Denies varicose veins, edema. Neuro: Denies strokes, or TIAs. Psych: Denies anxiety or depression. Endocrine: Denies thyroid problems or diabetes. Heme/Lymphatic: Denies easy bruising or lymphedema. Objective:     VS:    Visit Vitals  BP (!) 181/97 (BP 1 Location: Left arm, BP Patient Position: Sitting)   Pulse 69   Temp 97.9 °F (36.6 °C)   Resp 12   SpO2 95%       Physical Exam:    General appearance: alert, cooperative, no distress  Head: normocephalic, without obvious abnormality; atraumatic  Eyes: conjunctivae/corneas clear; EOM's intact. Nose: nares normal; no drainage. Neck: no carotid bruit and no JVD  Lungs: clear to auscultation bilaterally  Heart: regular rate and rhythm; no murmur  Abdomen: soft, non-tender; bowel sounds normal  Extremities: moves all extremities; no weakness. Skin: Skin color normal; No varicose veins or edema.   Neurologic: Grossly normal      Labs:   Recent Labs     07/04/19  0804 07/03/19  1656   WBC 10.9 9.6   HGB 12.7 13.3   HCT 39.2 40.4    276   NA  --  139 K  --  4.2   BUN  --  11   CREA  --  1.29   GLU  --  162*       Diagnostics:   PA and lateral: pending     Carotid doppler: Less than 50% plaque stenosis of the right and left internal carotid arteries. The right and left vertebral arteries are patent and with antegrade flow direction. Abnormal thyroid is noted: Heterogenous echotexture. PFTS-FEV1: pending     EKG: pending   Assessment:     Active Problems:    CAD (coronary artery disease) (7/5/2019)        Plan:   The risk and benefit of surgery were reviewed with patient and family and all questions answered and the patient wishes to proceed. Risk include infection, bleeding, stroke, heart attack, irregular heart rhythm, kidney failure and death. STS Risk Calculator V2.81 - Discussed by surgeon with patient. Pending workup     Treatment Plan:    1. CAD/NSTEMI: LM disease. Preop workup and education started. On asa, statin, BB, preop amio. Cont heparin gtt. Plans for CABG Monday 7/8 w/ Fiser 1st case. TTE completed. Plavix washout, got load 7/3.    2. HTN: on coreg. Got single dose of losartan this afternoon--will hold further ACE/ARB until surgery. PRN IV hydralazine. 3. GERD: doesn't take anything regularly. Tums PRN. 4. Chronic ulcerative colitis: controlled w/ diet. Takes mesalamine daily only. Pt avoids high fructose corn syrup and other items. Will ask nutrition to help with diet. Signed By: Laly Escobar NP     July 5, 2019      Risk of morbidity and mortality - high  Medical decision making - high complexity    1. CAD/NSTEMI: LM disease. Plavix washout, got load 7/3.    2. HTN: on coreg. 3. GERD: doesn't take anything regularly. Tums PRN. 4. Chronic ulcerative colitis: controlled w/ diet.

## 2019-07-05 NOTE — PROGRESS NOTES
Bedside and Verbal shift change report given to Johanne Reece (oncoming nurse) by Lalo Cortez RN (offgoing nurse). Report included the following information SBAR, Kardex, ED Summary, Intake/Output, Recent Results, Med Rec Status and Cardiac Rhythm NSR.    01:30 Bedside and Verbal shift change report given to Michael Young RN (oncoming nurse) by Johanne Reece (offgoing nurse). Report included the following information SBAR, Kardex, ED Summary, Intake/Output, Recent Results, Med Rec Status and Cardiac Rhythm NSR.

## 2019-07-05 NOTE — CARDIO/PULMONARY
Cardiac Rehab: 59 yo male admitted with Chest Pain. Per Dr Giovanny Gupta, dx includes: NSTEMI (7/2). S/P cardiac cath with distal LM 60% at trifurcation (7/5). LVEF 55-60% by echo (7/5/19). Plan for transfer to [facility] for cardiac surgery work up. Cardiac Rehab RN to f/u with patient following intervention.

## 2019-07-05 NOTE — PROGRESS NOTES
Spiritual Care Assessment/Progress Note  [facility]      NAME: Iris Pa      MRN: [id number]  AGE: 58 y.o.  SEX: male  Restoration Affiliation: No preference   Language: English     7/5/2019     Total Time (in minutes): 15     Spiritual Assessment begun in [facility] 3 PRO CARE TELE 1 through conversation with:         [x]Patient        [] Family    [] Friend(s)        Reason for Consult: Initial/Spiritual assessment, patient floor, Request by staff     Spiritual beliefs: (Please include comment if needed)     [] Identifies with a michael tradition:         [] Supported by a michael community:            [] Claims no spiritual orientation:           [] Seeking spiritual identity:                [] Adheres to an individual form of spirituality:           [] Not able to assess:                           Identified resources for coping:      [] Prayer                               [] Music                  [] Guided Imagery     [x] Family/friends                 [] Pet visits     [] Devotional reading                         [] Unknown     [x] Other: Pets                                              Interventions offered during this visit: (See comments for more details)    Patient Interventions: Affirmation of emotions/emotional suffering, Affirmation of michael, Catharsis/review of pertinent events in supportive environment, Coping skills reviewed/reinforced, Iconic (affirming the presence of God/Higher Power)           Plan of Care:     [] Support spiritual and/or cultural needs    [] Support AMD and/or advance care planning process      [] Support grieving process   [] Coordinate Rites and/or Rituals    [] Coordination with community clergy   [] No spiritual needs identified at this time   [] Detailed Plan of Care below (See Comments)  [] Make referral to Music Therapy  [] Make referral to Pet Therapy     [] Make referral to Addiction services  [] Make referral to [facility]  [] Make referral to Spiritual Care Partner  [] No future visits requested        [x] Follow up visits as needed     Comments:  visit for initial spiritual assessment, request by staff for one-time  visit. Patient sitting up in bed, good eye contact, smiling, friendly. Says he is feeling good and not as bad as his tests are saying. Provided spiritual presence and listening as he spoke of his current thoughts, feelings, and concerns. Spoke about the events leading to this hospitalization. Says this experience has caused him to reevaluate his life, diet, and work habits. 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 Works about 60-70 hours per week and feels he would like to slow down a bit. Says he has no other family or living relatives. Has a brother who lives in [place] and has an ex-wife who lives in the local area. He lives in [facility] [place] with his dogs which provide comfort and resources for coping. He says he is comfortable at this time and is awaiting transfer to [facility] and should be there late this afternoon or early evening. Says he is comfortable and not in need of anything at this time. Provided information concerning availability of  staff and pastoral care services. He appeared comforted and encouraged as a result of this visit and expressed gratitude for this visit. Visited by Rev. Sophia Red MDiv, [facility], [facility]   paging service: [phone])

## 2019-07-06 LAB
ANION GAP SERPL CALC-SCNC: 6 MMOL/L (ref 5–15)
APTT PPP: 51.6 SEC (ref 22.1–32)
APTT PPP: 58.4 SEC (ref 22.1–32)
APTT PPP: 64.1 SEC (ref 22.1–32)
APTT PPP: 95.7 SEC (ref 22.1–32)
BUN SERPL-MCNC: 16 MG/DL (ref 6–20)
BUN/CREAT SERPL: 15 (ref 12–20)
CALCIUM SERPL-MCNC: 8.3 MG/DL (ref 8.5–10.1)
CHLORIDE SERPL-SCNC: 107 MMOL/L (ref 97–108)
CO2 SERPL-SCNC: 27 MMOL/L (ref 21–32)
CREAT SERPL-MCNC: 1.04 MG/DL (ref 0.7–1.3)
ERYTHROCYTE [DISTWIDTH] IN BLOOD BY AUTOMATED COUNT: 15 % (ref 11.5–14.5)
GLUCOSE BLD STRIP.AUTO-MCNC: 111 MG/DL (ref 65–100)
GLUCOSE SERPL-MCNC: 96 MG/DL (ref 65–100)
HCT VFR BLD AUTO: 40.1 % (ref 36.6–50.3)
HGB BLD-MCNC: 12.5 G/DL (ref 12.1–17)
LEFT CCA DIST DIAS: 24.1 CM/S
LEFT CCA DIST SYS: 71.2 CM/S
LEFT CCA PROX DIAS: 22.6 CM/S
LEFT CCA PROX SYS: 82 CM/S
LEFT ECA DIAS: 8.68 CM/S
LEFT ECA SYS: 61.4 CM/S
LEFT ICA DIST DIAS: 31.8 CM/S
LEFT ICA DIST SYS: 88.8 CM/S
LEFT ICA MID DIAS: 18.5 CM/S
LEFT ICA MID SYS: 50.2 CM/S
LEFT ICA PROX DIAS: 28.1 CM/S
LEFT ICA PROX SYS: 87.5 CM/S
LEFT ICA/CCA SYS: 1.25
LEFT SUBCLAVIAN DIAS: 0 CM/S
LEFT SUBCLAVIAN SYS: 87.8 CM/S
LEFT VERTEBRAL DIAS: 18.22 CM/S
LEFT VERTEBRAL SYS: 51 CM/S
MCH RBC QN AUTO: 24.5 PG (ref 26–34)
MCHC RBC AUTO-ENTMCNC: 31.2 G/DL (ref 30–36.5)
MCV RBC AUTO: 78.6 FL (ref 80–99)
NRBC # BLD: 0 K/UL (ref 0–0.01)
NRBC BLD-RTO: 0 PER 100 WBC
PLATELET # BLD AUTO: 248 K/UL (ref 150–400)
PMV BLD AUTO: 10.1 FL (ref 8.9–12.9)
POTASSIUM SERPL-SCNC: 4 MMOL/L (ref 3.5–5.1)
RBC # BLD AUTO: 5.1 M/UL (ref 4.1–5.7)
RIGHT CCA DIST DIAS: 20.4 CM/S
RIGHT CCA DIST SYS: 58.8 CM/S
RIGHT CCA PROX DIAS: 8.6 CM/S
RIGHT CCA PROX SYS: 41.4 CM/S
RIGHT ECA DIAS: 9.25 CM/S
RIGHT ECA SYS: 70.3 CM/S
RIGHT ICA DIST DIAS: 20.6 CM/S
RIGHT ICA DIST SYS: 64.2 CM/S
RIGHT ICA MID DIAS: 32.5 CM/S
RIGHT ICA MID SYS: 77.6 CM/S
RIGHT ICA PROX DIAS: 25 CM/S
RIGHT ICA PROX SYS: 66.8 CM/S
RIGHT ICA/CCA SYS: 1.3
RIGHT SUBCLAVIAN DIAS: 0 CM/S
RIGHT SUBCLAVIAN SYS: 65.5 CM/S
RIGHT VERTEBRAL DIAS: 18.84 CM/S
RIGHT VERTEBRAL SYS: 61.6 CM/S
SERVICE CMNT-IMP: ABNORMAL
SODIUM SERPL-SCNC: 140 MMOL/L (ref 136–145)
THERAPEUTIC RANGE,PTTT: ABNORMAL SECS (ref 58–77)
WBC # BLD AUTO: 9.5 K/UL (ref 4.1–11.1)

## 2019-07-06 PROCEDURE — 85730 THROMBOPLASTIN TIME PARTIAL: CPT

## 2019-07-06 PROCEDURE — 36415 COLL VENOUS BLD VENIPUNCTURE: CPT

## 2019-07-06 PROCEDURE — 65660000001 HC RM ICU INTERMED STEPDOWN

## 2019-07-06 PROCEDURE — 85027 COMPLETE CBC AUTOMATED: CPT

## 2019-07-06 PROCEDURE — 80048 BASIC METABOLIC PNL TOTAL CA: CPT

## 2019-07-06 PROCEDURE — 74011250636 HC RX REV CODE- 250/636: Performed by: NURSE PRACTITIONER

## 2019-07-06 PROCEDURE — 74011250637 HC RX REV CODE- 250/637: Performed by: THORACIC SURGERY (CARDIOTHORACIC VASCULAR SURGERY)

## 2019-07-06 PROCEDURE — 82962 GLUCOSE BLOOD TEST: CPT

## 2019-07-06 PROCEDURE — 74011000250 HC RX REV CODE- 250: Performed by: NURSE PRACTITIONER

## 2019-07-06 PROCEDURE — 74011250637 HC RX REV CODE- 250/637: Performed by: NURSE PRACTITIONER

## 2019-07-06 RX ORDER — MUPIROCIN 20 MG/G
OINTMENT TOPICAL 2 TIMES DAILY
Status: DISCONTINUED | OUTPATIENT
Start: 2019-07-06 | End: 2019-07-08

## 2019-07-06 RX ORDER — ATORVASTATIN CALCIUM 40 MG/1
40 TABLET, FILM COATED ORAL DAILY
Status: DISCONTINUED | OUTPATIENT
Start: 2019-07-06 | End: 2019-07-08

## 2019-07-06 RX ORDER — HEPARIN SODIUM 10000 [USP'U]/100ML
12-25 INJECTION, SOLUTION INTRAVENOUS
Status: DISCONTINUED | OUTPATIENT
Start: 2019-07-06 | End: 2019-07-06

## 2019-07-06 RX ORDER — GUAIFENESIN 100 MG/5ML
81 LIQUID (ML) ORAL DAILY
Status: DISCONTINUED | OUTPATIENT
Start: 2019-07-06 | End: 2019-07-08

## 2019-07-06 RX ORDER — AMLODIPINE BESYLATE 5 MG/1
2.5 TABLET ORAL DAILY
Status: DISCONTINUED | OUTPATIENT
Start: 2019-07-06 | End: 2019-07-08

## 2019-07-06 RX ORDER — CARVEDILOL 12.5 MG/1
12.5 TABLET ORAL 2 TIMES DAILY WITH MEALS
Status: DISCONTINUED | OUTPATIENT
Start: 2019-07-06 | End: 2019-07-08

## 2019-07-06 RX ORDER — ACETAMINOPHEN 325 MG/1
650 TABLET ORAL
Status: DISCONTINUED | OUTPATIENT
Start: 2019-07-06 | End: 2019-07-08

## 2019-07-06 RX ADMIN — CARVEDILOL 12.5 MG: 12.5 TABLET, FILM COATED ORAL at 21:38

## 2019-07-06 RX ADMIN — AMIODARONE HYDROCHLORIDE 400 MG: 200 TABLET ORAL at 09:26

## 2019-07-06 RX ADMIN — CARVEDILOL 12.5 MG: 12.5 TABLET, FILM COATED ORAL at 09:26

## 2019-07-06 RX ADMIN — ATORVASTATIN CALCIUM 40 MG: 40 TABLET, FILM COATED ORAL at 10:39

## 2019-07-06 RX ADMIN — PANTOPRAZOLE SODIUM 40 MG: 40 TABLET, DELAYED RELEASE ORAL at 07:24

## 2019-07-06 RX ADMIN — ASPIRIN 81 MG 81 MG: 81 TABLET ORAL at 10:39

## 2019-07-06 RX ADMIN — CHLORHEXIDINE GLUCONATE 15 ML: 1.2 RINSE ORAL at 21:41

## 2019-07-06 RX ADMIN — HYDRALAZINE HYDROCHLORIDE 20 MG: 20 INJECTION INTRAMUSCULAR; INTRAVENOUS at 03:42

## 2019-07-06 RX ADMIN — ACETAMINOPHEN 650 MG: 325 TABLET ORAL at 04:29

## 2019-07-06 RX ADMIN — HEPARIN SODIUM AND DEXTROSE 17 UNITS/KG/HR: 10000; 5 INJECTION INTRAVENOUS at 19:44

## 2019-07-06 RX ADMIN — ACETAMINOPHEN 650 MG: 325 TABLET ORAL at 19:43

## 2019-07-06 RX ADMIN — Medication 10 ML: at 14:00

## 2019-07-06 RX ADMIN — Medication 10 ML: at 07:24

## 2019-07-06 RX ADMIN — AMIODARONE HYDROCHLORIDE 400 MG: 200 TABLET ORAL at 21:38

## 2019-07-06 RX ADMIN — MUPIROCIN: 20 OINTMENT TOPICAL at 21:39

## 2019-07-06 RX ADMIN — MUPIROCIN: 20 OINTMENT TOPICAL at 10:39

## 2019-07-06 RX ADMIN — MESALAMINE 800 MG: 800 TABLET, DELAYED RELEASE ORAL at 09:29

## 2019-07-06 RX ADMIN — Medication 10 ML: at 21:41

## 2019-07-06 RX ADMIN — AMLODIPINE BESYLATE 2.5 MG: 5 TABLET ORAL at 13:47

## 2019-07-06 RX ADMIN — CHLORHEXIDINE GLUCONATE 15 ML: 1.2 RINSE ORAL at 09:27

## 2019-07-06 NOTE — PROGRESS NOTES
2330: Bedside and Verbal shift change report given to VIRAJ RN (oncoming nurse) by Willis Siddiqui RN (offgoing nurse). Report included the following information SBAR, Kardex, Intake/Output, MAR, Recent Results, Med Rec Status and Cardiac Rhythm NSR.   0220: Heparin gtt held d/t PTT of 95.7.   0320: Heparin gtt restarted at 16 units/kg/hr. Will redraw PTT at 0920.  0330: 20 mg IV Hydralazine given for BP of 185/101. Will recheck BP.   0730: Bedside and Verbal shift change report given to [facility], RN (oncoming nurse) by JAZ CALI (offgoing nurse). Report included the following information SBAR, Kardex, Intake/Output, MAR, Recent Results, Med Rec Status and Cardiac Rhythm NSR.

## 2019-07-06 NOTE — PROGRESS NOTES
Bedside and Verbal shift change report given to Yfn Barry (oncoming nurse) by JAZ CALI (offgoing nurse). Report included the following information SBAR, Kardex, ED Summary, Procedure Summary, Intake/Output, MAR, Accordion, Recent Results, Med Rec Status and Cardiac Rhythm NSR . Problem: Patient Education: Go to Patient Education Activity  Goal: Patient/Family Education  Outcome: Progressing Towards Goal     Problem: Unstable angina/NSTEMI: Day 2  Goal: Activity/Safety  Outcome: Progressing Towards Goal  Goal: Nutrition/Diet  Outcome: Progressing Towards Goal    0920 Redraw of PTT    1038 Patient PTT is 51.6. Increased rate by 1 unit/kg/h to 17. Redraw at 1630.    1800 Called lab for stat PTT results as it is not reported. Patient is therapeutic at 64.1. Next draw is 2230.     1800 Patient noticed to be in NSR with new 1 AVB with SC interval of 22     1930 Bedside and Verbal shift change report given to Jasmin (oncoming nurse) by Rand Kiser RN (offgoing nurse). Report included the following information SBAR, Kardex, ED Summary, Procedure Summary, Intake/Output, MAR, Accordion, Recent Results, Med Rec Status and Cardiac Rhythm NSR with 1AVB.

## 2019-07-06 NOTE — PROGRESS NOTES
CSS FLOOR Progress Note    Admit Date: 2019         Subjective:   Pt seen with Dr. Daren Dela Cruz. On RA. On heparin gtt. Denies complaints. Objective:     Visit Vitals  /66 (BP 1 Location: Right arm, BP Patient Position: Sitting)   Pulse 77   Temp 97.6 °F (36.4 °C)   Resp 18   Wt 185 lb 6.5 oz (84.1 kg)   SpO2 94%   BMI 25.15 kg/m²       Temp (24hrs), Av.8 °F (36.6 °C), Min:97.6 °F (36.4 °C), Max:98 °F (36.7 °C)      Last 24hr Input/Output:    Intake/Output Summary (Last 24 hours) at 2019 1121  Last data filed at 2019 0900  Gross per 24 hour   Intake 841.28 ml   Output 0 ml   Net 841.28 ml        EKG:  SR     Oxygen: RA    CXR:   CXR Results  (Last 48 hours)               19 1747  XR CHEST PA LAT Final result    Impression:  IMPRESSION: No acute cardiopulmonary process seen       Narrative:  EXAM: XR CHEST PA LAT       INDICATION: pre-op heart       COMPARISON: 7/3/2019. FINDINGS: PA and lateral radiographs of the chest demonstrate clear lungs. Cardiac leads are in place. The cardiac and mediastinal contours and pulmonary   vascularity are normal. The bones and soft tissues are within normal limits. Admission Weight: Last Weight   Weight: 185 lb 6.5 oz (84.1 kg) Weight: 185 lb 6.5 oz (84.1 kg)       EXAM:      Lungs:   Clear to auscultation bilaterally. Heart:  Regular rate and rhythm, S1, S2 normal, no murmur, click, rub or gallop. Abdomen:   Soft, non-tender. Bowel sounds normal. No masses,  No organomegaly. Extremities:  No edema. PPP. Neurologic:  Gross motor and sensory apparatus intact. Activity: ad hiram     Diet:  Cardiac diet     Lab Data Reviewed:   Recent Labs     19  0118 19  1645   WBC 9.5 9.5   HGB 12.5 12.9   HCT 40.1 41.5    255   CREA 1.04 1.17   INR  --  1.1       Assessment:     Active Problems:    CAD (coronary artery disease) (2019)             Plan/Recommendations/Medical Decision Makin. CAD/NSTEMI: LM disease. Preop workup and education started. On asa, statin, BB, preop amio. Cont heparin gtt. Plans for CABG Monday 7/8 w/ Fiser 1st case. TTE completed. Plavix washout, got load 7/3.    2. HTN: increase coreg, add norvasc. Got single dose of losartan this afternoon--will hold further ACE/ARB until surgery. PRN IV hydralazine. 3. GERD: doesn't take anything regularly. Tums PRN. 4. Chronic ulcerative colitis: controlled w/ diet. Takes mesalamine daily only. Pt avoids high fructose corn syrup and other items. Will ask nutrition to help with diet.       Signed By: Viktor Agarwal, NP

## 2019-07-06 NOTE — PROGRESS NOTES
2000: Bedside and Verbal shift change report given to Krish Watson RN (oncoming nurse) by Nidia Pemberton RN (offgoing nurse). Report included the following information SBAR, Kardex, ED Summary, Procedure Summary, Intake/Output, MAR, Recent Results and Med Rec Status. 0000: Bedside and Verbal shift change report given to JAZ CALI (oncoming nurse) by Krish Watson RN (offgoing nurse). Report included the following information SBAR, Kardex, ED Summary, Procedure Summary, Intake/Output, MAR, Recent Results and Med Rec Status.

## 2019-07-07 ENCOUNTER — ANESTHESIA EVENT (OUTPATIENT)
Dept: CARDIOTHORACIC SURGERY | Age: 64
DRG: 236 | End: 2019-07-07
Payer: COMMERCIAL

## 2019-07-07 LAB
ANION GAP SERPL CALC-SCNC: 8 MMOL/L (ref 5–15)
BASOPHILS # BLD: 0.1 K/UL (ref 0–0.1)
BASOPHILS NFR BLD: 1 % (ref 0–1)
BUN SERPL-MCNC: 17 MG/DL (ref 6–20)
BUN/CREAT SERPL: 15 (ref 12–20)
CALCIUM SERPL-MCNC: 8.8 MG/DL (ref 8.5–10.1)
CHLORIDE SERPL-SCNC: 110 MMOL/L (ref 97–108)
CO2 SERPL-SCNC: 22 MMOL/L (ref 21–32)
CREAT SERPL-MCNC: 1.15 MG/DL (ref 0.7–1.3)
DIFFERENTIAL METHOD BLD: ABNORMAL
EOSINOPHIL # BLD: 0.5 K/UL (ref 0–0.4)
EOSINOPHIL NFR BLD: 5 % (ref 0–7)
ERYTHROCYTE [DISTWIDTH] IN BLOOD BY AUTOMATED COUNT: 15.2 % (ref 11.5–14.5)
GLUCOSE SERPL-MCNC: 82 MG/DL (ref 65–100)
HCT VFR BLD AUTO: 42.8 % (ref 36.6–50.3)
HGB BLD-MCNC: 13.4 G/DL (ref 12.1–17)
IMM GRANULOCYTES # BLD AUTO: 0 K/UL (ref 0–0.04)
IMM GRANULOCYTES NFR BLD AUTO: 0 % (ref 0–0.5)
LYMPHOCYTES # BLD: 3 K/UL (ref 0.8–3.5)
LYMPHOCYTES NFR BLD: 30 % (ref 12–49)
MAGNESIUM SERPL-MCNC: 2.3 MG/DL (ref 1.6–2.4)
MCH RBC QN AUTO: 24.6 PG (ref 26–34)
MCHC RBC AUTO-ENTMCNC: 31.3 G/DL (ref 30–36.5)
MCV RBC AUTO: 78.7 FL (ref 80–99)
MONOCYTES # BLD: 0.7 K/UL (ref 0–1)
MONOCYTES NFR BLD: 7 % (ref 5–13)
NEUTS SEG # BLD: 5.7 K/UL (ref 1.8–8)
NEUTS SEG NFR BLD: 57 % (ref 32–75)
NRBC # BLD: 0 K/UL (ref 0–0.01)
NRBC BLD-RTO: 0 PER 100 WBC
PLATELET # BLD AUTO: 252 K/UL (ref 150–400)
PMV BLD AUTO: 10 FL (ref 8.9–12.9)
POTASSIUM SERPL-SCNC: 4 MMOL/L (ref 3.5–5.1)
RBC # BLD AUTO: 5.44 M/UL (ref 4.1–5.7)
SODIUM SERPL-SCNC: 140 MMOL/L (ref 136–145)
WBC # BLD AUTO: 10 K/UL (ref 4.1–11.1)

## 2019-07-07 PROCEDURE — 83735 ASSAY OF MAGNESIUM: CPT

## 2019-07-07 PROCEDURE — 85025 COMPLETE CBC W/AUTO DIFF WBC: CPT

## 2019-07-07 PROCEDURE — 65660000001 HC RM ICU INTERMED STEPDOWN

## 2019-07-07 PROCEDURE — 74011250637 HC RX REV CODE- 250/637: Performed by: NURSE PRACTITIONER

## 2019-07-07 PROCEDURE — 74011250637 HC RX REV CODE- 250/637: Performed by: THORACIC SURGERY (CARDIOTHORACIC VASCULAR SURGERY)

## 2019-07-07 PROCEDURE — 80048 BASIC METABOLIC PNL TOTAL CA: CPT

## 2019-07-07 PROCEDURE — 74011250636 HC RX REV CODE- 250/636: Performed by: NURSE PRACTITIONER

## 2019-07-07 PROCEDURE — 36415 COLL VENOUS BLD VENIPUNCTURE: CPT

## 2019-07-07 RX ORDER — PHENYLEPHRINE 10 MG/250 ML(40 MCG/ML)IN 0.9 % SOD.CHLORIDE INTRAVENOUS
10-100
Status: DISCONTINUED | OUTPATIENT
Start: 2019-07-08 | End: 2019-07-08 | Stop reason: HOSPADM

## 2019-07-07 RX ORDER — NITROGLYCERIN 20 MG/100ML
16.5 INJECTION INTRAVENOUS CONTINUOUS
Status: DISCONTINUED | OUTPATIENT
Start: 2019-07-08 | End: 2019-07-08

## 2019-07-07 RX ORDER — MAGNESIUM SULFATE HEPTAHYDRATE 40 MG/ML
2 INJECTION, SOLUTION INTRAVENOUS ONCE
Status: DISCONTINUED | OUTPATIENT
Start: 2019-07-08 | End: 2019-07-08 | Stop reason: HOSPADM

## 2019-07-07 RX ORDER — PROTAMINE SULFATE 10 MG/ML
500 INJECTION, SOLUTION INTRAVENOUS ONCE
Status: DISCONTINUED | OUTPATIENT
Start: 2019-07-08 | End: 2019-07-08 | Stop reason: HOSPADM

## 2019-07-07 RX ORDER — ALBUMIN HUMAN 50 G/1000ML
25 SOLUTION INTRAVENOUS ONCE
Status: DISCONTINUED | OUTPATIENT
Start: 2019-07-08 | End: 2019-07-08 | Stop reason: HOSPADM

## 2019-07-07 RX ORDER — HEPARIN SOD,PORCINE/0.9 % NACL 30K/1000ML
50-1000 INTRAVENOUS SOLUTION INTRAVENOUS AS NEEDED
Status: DISCONTINUED | OUTPATIENT
Start: 2019-07-08 | End: 2019-07-08 | Stop reason: HOSPADM

## 2019-07-07 RX ORDER — DOBUTAMINE HYDROCHLORIDE 200 MG/100ML
0-10 INJECTION INTRAVENOUS
Status: DISCONTINUED | OUTPATIENT
Start: 2019-07-08 | End: 2019-07-09

## 2019-07-07 RX ORDER — POTASSIUM CHLORIDE 29.8 MG/ML
20 INJECTION INTRAVENOUS ONCE
Status: DISCONTINUED | OUTPATIENT
Start: 2019-07-08 | End: 2019-07-08 | Stop reason: HOSPADM

## 2019-07-07 RX ADMIN — MUPIROCIN: 20 OINTMENT TOPICAL at 21:29

## 2019-07-07 RX ADMIN — CARVEDILOL 12.5 MG: 12.5 TABLET, FILM COATED ORAL at 21:29

## 2019-07-07 RX ADMIN — Medication 10 ML: at 19:23

## 2019-07-07 RX ADMIN — AMIODARONE HYDROCHLORIDE 400 MG: 200 TABLET ORAL at 09:38

## 2019-07-07 RX ADMIN — CARVEDILOL 12.5 MG: 12.5 TABLET, FILM COATED ORAL at 07:47

## 2019-07-07 RX ADMIN — Medication 10 ML: at 06:13

## 2019-07-07 RX ADMIN — MESALAMINE 800 MG: 800 TABLET, DELAYED RELEASE ORAL at 09:41

## 2019-07-07 RX ADMIN — ATORVASTATIN CALCIUM 40 MG: 40 TABLET, FILM COATED ORAL at 09:39

## 2019-07-07 RX ADMIN — CHLORHEXIDINE GLUCONATE 15 ML: 1.2 RINSE ORAL at 21:29

## 2019-07-07 RX ADMIN — PANTOPRAZOLE SODIUM 40 MG: 40 TABLET, DELAYED RELEASE ORAL at 06:12

## 2019-07-07 RX ADMIN — ASPIRIN 81 MG 81 MG: 81 TABLET ORAL at 09:39

## 2019-07-07 RX ADMIN — CHLORHEXIDINE GLUCONATE 15 ML: 1.2 RINSE ORAL at 07:47

## 2019-07-07 RX ADMIN — AMLODIPINE BESYLATE 2.5 MG: 5 TABLET ORAL at 09:39

## 2019-07-07 RX ADMIN — AMIODARONE HYDROCHLORIDE 400 MG: 200 TABLET ORAL at 21:29

## 2019-07-07 RX ADMIN — MUPIROCIN: 20 OINTMENT TOPICAL at 09:42

## 2019-07-07 RX ADMIN — HEPARIN SODIUM AND DEXTROSE 17 UNITS/KG/HR: 10000; 5 INJECTION INTRAVENOUS at 14:49

## 2019-07-07 RX ADMIN — Medication 10 ML: at 21:30

## 2019-07-07 NOTE — PROGRESS NOTES
Bedside and Verbal shift change report given to [facility] Levyjenifer 57 (oncoming nurse) by Payton Talamantes (offgoing nurse). Report included the following information SBAR, Kardex, ED Summary, Intake/Output, MAR, Accordion, Recent Results, Med Rec Status and Cardiac Rhythm NSR 1 AVB . Problem: Patient Education: Go to Patient Education Activity  Goal: Patient/Family Education  Outcome: Progressing Towards Goal     Problem: Patient Education: Go to Patient Education Activity  Goal: Patient/Family Education  Outcome: Progressing Towards Goal     Problem: CABG: Pre-Op Day  Goal: Off Pathway (Use only if patient is Off Pathway)  Outcome: Progressing Towards Goal  Goal: Activity/Safety  Outcome: Progressing Towards Goal         1133 Patient off monitor to take shower. 1158 Back on monitor after shower. 1930 Patient has had an uneventful shift. 1930 Bedside and Verbal shift change report given to Payton Talamantes (oncoming nurse) by [facility] (offgoing nurse). Report included the following information SBAR, Kardex, ED Summary, Intake/Output, MAR, Accordion, Recent Results, Med Rec Status and Cardiac Rhythm NSR 1 AVB .

## 2019-07-07 NOTE — PROGRESS NOTES
1930  Bedside shift change report given to Laine De Guzman (oncoming nurse) by Yesica Tran (offgoing nurse). Report included the following information SBAR, Kardex, Procedure Summary, Intake/Output, MAR and Recent Results. 2200  Pt provided IS device and instructed how to use it. He returned a demonstration and was able to pull 2500 on the IS.    0730  Bedside shift change report given to Yesica Tran (oncoming nurse) by Laine De Guzman (offgoing nurse). Report included the following information SBAR, Kardex, Procedure Summary, Intake/Output, MAR and Recent Results.

## 2019-07-07 NOTE — PROGRESS NOTES
Immediate post-op recovery period discussed with patient. Reviewed intubation, plan for extubation, activity restrictions, pain and management, diet, and need to continue to utilize IS. Allowed time for questions, comments or concerns. Patient very appropriate and eager for surgery. Patient does have concerns as he lives at home by himself.

## 2019-07-07 NOTE — PROGRESS NOTES
CSS FLOOR Progress Note    Admit Date: 2019         Subjective:   Pt seen with Dr. Wagner Anthony. On RA. On heparin gtt. Denies complaints. Objective:     Visit Vitals  /90 (BP 1 Location: Right arm, BP Patient Position: Sitting)   Pulse 62   Temp 97.9 °F (36.6 °C)   Resp 16   Wt 184 lb 11.9 oz (83.8 kg)   SpO2 97%   BMI 25.06 kg/m²       Temp (24hrs), Av.9 °F (36.6 °C), Min:97.6 °F (36.4 °C), Max:98.3 °F (36.8 °C)      Last 24hr Input/Output:    Intake/Output Summary (Last 24 hours) at 2019 1111  Last data filed at 2019 0854  Gross per 24 hour   Intake 1146.51 ml   Output 0 ml   Net 1146.51 ml        EKG:  SR     Oxygen: RA    CXR:   CXR Results  (Last 48 hours)               19 1747  XR CHEST PA LAT Final result    Impression:  IMPRESSION: No acute cardiopulmonary process seen       Narrative:  EXAM: XR CHEST PA LAT       INDICATION: pre-op heart       COMPARISON: 7/3/2019. FINDINGS: PA and lateral radiographs of the chest demonstrate clear lungs. Cardiac leads are in place. The cardiac and mediastinal contours and pulmonary   vascularity are normal. The bones and soft tissues are within normal limits. Admission Weight: Last Weight   Weight: 185 lb 6.5 oz (84.1 kg) Weight: 184 lb 11.9 oz (83.8 kg)       EXAM:      Lungs:   Clear to auscultation bilaterally. Heart:  Regular rate and rhythm, S1, S2 normal, no murmur, click, rub or gallop. Abdomen:   Soft, non-tender. Bowel sounds normal. No masses,  No organomegaly. Extremities:  No edema. PPP. Neurologic:  Gross motor and sensory apparatus intact. Activity: ad hiram     Diet:  Cardiac diet     Lab Data Reviewed:   Recent Labs     19  0452  19  1645   WBC 10.0   < > 9.5   HGB 13.4   < > 12.9   HCT 42.8   < > 41.5      < > 255   CREA 1.15   < > 1.17   INR  --   --  1.1    < > = values in this interval not displayed.        Assessment:     Active Problems:    CAD (coronary artery disease) (2019)             Plan/Recommendations/Medical Decision Makin. CAD/NSTEMI: LM disease. Preop workup and education started. On asa, statin, BB, preop amio. Cont heparin gtt. Plans for CABG  w/  1st case. TTE completed. Plavix washout, got load 7/3. Hold heparin gtt at midnight. 2. HTN: improved, cont coreg, norvasc. Got single dose of losartan 7;5--will hold further ACE/ARB until surgery. PRN IV hydralazine. 3. GERD: doesn't take anything regularly. Tums PRN. 4. Chronic ulcerative colitis: controlled w/ diet. Takes mesalamine daily only. Pt avoids high fructose corn syrup and other items. Will ask nutrition to help with diet.       Signed By: Perta Rashid NP

## 2019-07-07 NOTE — PROGRESS NOTES
Problem: CABG: Pre-Op Day  Goal: Treatments/Interventions/Procedures  Outcome: Progressing Towards Goal

## 2019-07-08 ENCOUNTER — APPOINTMENT (OUTPATIENT)
Dept: NON INVASIVE DIAGNOSTICS | Age: 64
DRG: 236 | End: 2019-07-08
Attending: THORACIC SURGERY (CARDIOTHORACIC VASCULAR SURGERY)
Payer: COMMERCIAL

## 2019-07-08 ENCOUNTER — APPOINTMENT (OUTPATIENT)
Dept: GENERAL RADIOLOGY | Age: 64
DRG: 236 | End: 2019-07-08
Attending: NURSE PRACTITIONER
Payer: COMMERCIAL

## 2019-07-08 ENCOUNTER — ANESTHESIA (OUTPATIENT)
Dept: CARDIOTHORACIC SURGERY | Age: 64
DRG: 236 | End: 2019-07-08
Payer: COMMERCIAL

## 2019-07-08 PROBLEM — Z95.1 S/P CABG X 3: Status: ACTIVE | Noted: 2019-07-08

## 2019-07-08 LAB
ADMINISTERED INITIALS, ADMINIT: NORMAL
ALBUMIN SERPL-MCNC: 3.1 G/DL (ref 3.5–5)
ALBUMIN SERPL-MCNC: 3.4 G/DL (ref 3.5–5)
ALBUMIN/GLOB SERPL: 1.3 {RATIO} (ref 1.1–2.2)
ALBUMIN/GLOB SERPL: 1.3 {RATIO} (ref 1.1–2.2)
ALP SERPL-CCNC: 57 U/L (ref 45–117)
ALP SERPL-CCNC: 64 U/L (ref 45–117)
ALT SERPL-CCNC: 23 U/L (ref 12–78)
ALT SERPL-CCNC: 28 U/L (ref 12–78)
ANION GAP SERPL CALC-SCNC: 6 MMOL/L (ref 5–15)
ANION GAP SERPL CALC-SCNC: 6 MMOL/L (ref 5–15)
ANION GAP SERPL CALC-SCNC: 7 MMOL/L (ref 5–15)
APTT PPP: 29.2 SEC (ref 22.1–32)
APTT PPP: 31.6 SEC (ref 22.1–32)
ARTERIAL PATENCY WRIST A: ABNORMAL
AST SERPL-CCNC: 22 U/L (ref 15–37)
AST SERPL-CCNC: 29 U/L (ref 15–37)
BASE DEFICIT BLD-SCNC: 3 MMOL/L
BASE DEFICIT BLD-SCNC: 4 MMOL/L
BASE DEFICIT BLDV-SCNC: 2 MMOL/L
BASOPHILS # BLD: 0.1 K/UL (ref 0–0.1)
BASOPHILS # BLD: 0.1 K/UL (ref 0–0.1)
BASOPHILS NFR BLD: 0 % (ref 0–1)
BASOPHILS NFR BLD: 1 % (ref 0–1)
BDY SITE: ABNORMAL
BILIRUB SERPL-MCNC: 0.4 MG/DL (ref 0.2–1)
BILIRUB SERPL-MCNC: 0.6 MG/DL (ref 0.2–1)
BUN SERPL-MCNC: 15 MG/DL (ref 6–20)
BUN SERPL-MCNC: 16 MG/DL (ref 6–20)
BUN SERPL-MCNC: 17 MG/DL (ref 6–20)
BUN/CREAT SERPL: 12 (ref 12–20)
BUN/CREAT SERPL: 13 (ref 12–20)
BUN/CREAT SERPL: 13 (ref 12–20)
CALCIUM SERPL-MCNC: 7.7 MG/DL (ref 8.5–10.1)
CALCIUM SERPL-MCNC: 7.9 MG/DL (ref 8.5–10.1)
CALCIUM SERPL-MCNC: 9.1 MG/DL (ref 8.5–10.1)
CHLORIDE SERPL-SCNC: 109 MMOL/L (ref 97–108)
CHLORIDE SERPL-SCNC: 110 MMOL/L (ref 97–108)
CHLORIDE SERPL-SCNC: 111 MMOL/L (ref 97–108)
CO2 SERPL-SCNC: 24 MMOL/L (ref 21–32)
CO2 SERPL-SCNC: 24 MMOL/L (ref 21–32)
CO2 SERPL-SCNC: 25 MMOL/L (ref 21–32)
CREAT SERPL-MCNC: 1.24 MG/DL (ref 0.7–1.3)
CREAT SERPL-MCNC: 1.29 MG/DL (ref 0.7–1.3)
CREAT SERPL-MCNC: 1.3 MG/DL (ref 0.7–1.3)
D50 ADMINISTERED, D50ADM: 0 ML
D50 ORDER, D50ORD: 0 ML
DIFFERENTIAL METHOD BLD: ABNORMAL
DIFFERENTIAL METHOD BLD: ABNORMAL
EOSINOPHIL # BLD: 0.3 K/UL (ref 0–0.4)
EOSINOPHIL # BLD: 0.5 K/UL (ref 0–0.4)
EOSINOPHIL NFR BLD: 1 % (ref 0–7)
EOSINOPHIL NFR BLD: 5 % (ref 0–7)
ERYTHROCYTE [DISTWIDTH] IN BLOOD BY AUTOMATED COUNT: 15 % (ref 11.5–14.5)
ERYTHROCYTE [DISTWIDTH] IN BLOOD BY AUTOMATED COUNT: 15.1 % (ref 11.5–14.5)
GAS FLOW.O2 O2 DELIVERY SYS: ABNORMAL L/MIN
GAS FLOW.O2 SETTING OXYMISER: 12 BPM
GLOBULIN SER CALC-MCNC: 2.3 G/DL (ref 2–4)
GLOBULIN SER CALC-MCNC: 2.7 G/DL (ref 2–4)
GLSCOM COMMENTS: NORMAL
GLUCOSE BLD STRIP.AUTO-MCNC: 100 MG/DL (ref 65–100)
GLUCOSE BLD STRIP.AUTO-MCNC: 102 MG/DL (ref 65–100)
GLUCOSE BLD STRIP.AUTO-MCNC: 102 MG/DL (ref 65–100)
GLUCOSE BLD STRIP.AUTO-MCNC: 110 MG/DL (ref 65–100)
GLUCOSE BLD STRIP.AUTO-MCNC: 114 MG/DL (ref 65–100)
GLUCOSE BLD STRIP.AUTO-MCNC: 118 MG/DL (ref 65–100)
GLUCOSE BLD STRIP.AUTO-MCNC: 131 MG/DL (ref 65–100)
GLUCOSE BLD STRIP.AUTO-MCNC: 145 MG/DL (ref 65–100)
GLUCOSE BLD STRIP.AUTO-MCNC: 91 MG/DL (ref 65–100)
GLUCOSE BLD STRIP.AUTO-MCNC: 98 MG/DL (ref 65–100)
GLUCOSE SERPL-MCNC: 116 MG/DL (ref 65–100)
GLUCOSE SERPL-MCNC: 134 MG/DL (ref 65–100)
GLUCOSE SERPL-MCNC: 84 MG/DL (ref 65–100)
GLUCOSE, GLC: 100 MG/DL
GLUCOSE, GLC: 102 MG/DL
GLUCOSE, GLC: 102 MG/DL
GLUCOSE, GLC: 110 MG/DL
GLUCOSE, GLC: 114 MG/DL
GLUCOSE, GLC: 118 MG/DL
GLUCOSE, GLC: 131 MG/DL
GLUCOSE, GLC: 145 MG/DL
GLUCOSE, GLC: 91 MG/DL
GLUCOSE, GLC: 98 MG/DL
HCO3 BLD-SCNC: 20.4 MMOL/L (ref 22–26)
HCO3 BLD-SCNC: 24.2 MMOL/L (ref 22–26)
HCO3 BLDV-SCNC: 24.7 MMOL/L (ref 23–28)
HCT VFR BLD AUTO: 36.1 % (ref 36.6–50.3)
HCT VFR BLD AUTO: 38.4 % (ref 36.6–50.3)
HCT VFR BLD AUTO: 45.4 % (ref 36.6–50.3)
HGB BLD-MCNC: 11.2 G/DL (ref 12.1–17)
HGB BLD-MCNC: 11.9 G/DL (ref 12.1–17)
HGB BLD-MCNC: 13.8 G/DL (ref 12.1–17)
HIGH TARGET, HITG: 130 MG/DL
IMM GRANULOCYTES # BLD AUTO: 0 K/UL (ref 0–0.04)
IMM GRANULOCYTES # BLD AUTO: 0.1 K/UL (ref 0–0.04)
IMM GRANULOCYTES NFR BLD AUTO: 0 % (ref 0–0.5)
IMM GRANULOCYTES NFR BLD AUTO: 1 % (ref 0–0.5)
INR PPP: 1.4 (ref 0.9–1.1)
INSULIN ADMINSTERED, INSADM: 0.9 UNITS/HOUR
INSULIN ADMINSTERED, INSADM: 1.5 UNITS/HOUR
INSULIN ADMINSTERED, INSADM: 1.6 UNITS/HOUR
INSULIN ADMINSTERED, INSADM: 1.7 UNITS/HOUR
INSULIN ADMINSTERED, INSADM: 2 UNITS/HOUR
INSULIN ADMINSTERED, INSADM: 2.2 UNITS/HOUR
INSULIN ADMINSTERED, INSADM: 2.6 UNITS/HOUR
INSULIN ADMINSTERED, INSADM: 2.8 UNITS/HOUR
INSULIN ORDER, INSORD: 0.9 UNITS/HOUR
INSULIN ORDER, INSORD: 1.5 UNITS/HOUR
INSULIN ORDER, INSORD: 1.6 UNITS/HOUR
INSULIN ORDER, INSORD: 1.7 UNITS/HOUR
INSULIN ORDER, INSORD: 2 UNITS/HOUR
INSULIN ORDER, INSORD: 2.2 UNITS/HOUR
INSULIN ORDER, INSORD: 2.6 UNITS/HOUR
INSULIN ORDER, INSORD: 2.8 UNITS/HOUR
LOW TARGET, LOT: 95 MG/DL
LYMPHOCYTES # BLD: 1.5 K/UL (ref 0.8–3.5)
LYMPHOCYTES # BLD: 2.8 K/UL (ref 0.8–3.5)
LYMPHOCYTES NFR BLD: 30 % (ref 12–49)
LYMPHOCYTES NFR BLD: 8 % (ref 12–49)
MAGNESIUM SERPL-MCNC: 2 MG/DL (ref 1.6–2.4)
MAGNESIUM SERPL-MCNC: 2.2 MG/DL (ref 1.6–2.4)
MAGNESIUM SERPL-MCNC: 2.2 MG/DL (ref 1.6–2.4)
MCH RBC QN AUTO: 24.3 PG (ref 26–34)
MCH RBC QN AUTO: 24.9 PG (ref 26–34)
MCHC RBC AUTO-ENTMCNC: 30.4 G/DL (ref 30–36.5)
MCHC RBC AUTO-ENTMCNC: 31 G/DL (ref 30–36.5)
MCV RBC AUTO: 79.9 FL (ref 80–99)
MCV RBC AUTO: 80.2 FL (ref 80–99)
MINUTES UNTIL NEXT BG, NBG: 120 MIN
MINUTES UNTIL NEXT BG, NBG: 60 MIN
MONOCYTES # BLD: 0.6 K/UL (ref 0–1)
MONOCYTES # BLD: 0.9 K/UL (ref 0–1)
MONOCYTES NFR BLD: 5 % (ref 5–13)
MONOCYTES NFR BLD: 7 % (ref 5–13)
MULTIPLIER, MUL: 0.03
MULTIPLIER, MUL: 0.04
NEUTS SEG # BLD: 16 K/UL (ref 1.8–8)
NEUTS SEG # BLD: 5.5 K/UL (ref 1.8–8)
NEUTS SEG NFR BLD: 57 % (ref 32–75)
NEUTS SEG NFR BLD: 85 % (ref 32–75)
NRBC # BLD: 0 K/UL (ref 0–0.01)
NRBC # BLD: 0 K/UL (ref 0–0.01)
NRBC BLD-RTO: 0 PER 100 WBC
NRBC BLD-RTO: 0 PER 100 WBC
O2/TOTAL GAS SETTING VFR VENT: 0.8 %
O2/TOTAL GAS SETTING VFR VENT: 0.8 %
O2/TOTAL GAS SETTING VFR VENT: 50 %
ORDER INITIALS, ORDINIT: NORMAL
PCO2 BLD: 32.4 MMHG (ref 35–45)
PCO2 BLD: 50.9 MMHG (ref 35–45)
PCO2 BLDV: 53.2 MMHG (ref 41–51)
PEEP RESPIRATORY: 5 CMH2O
PH BLD: 7.29 [PH] (ref 7.35–7.45)
PH BLD: 7.41 [PH] (ref 7.35–7.45)
PH BLDV: 7.28 [PH] (ref 7.32–7.42)
PLATELET # BLD AUTO: 181 K/UL (ref 150–400)
PLATELET # BLD AUTO: 234 K/UL (ref 150–400)
PMV BLD AUTO: 10 FL (ref 8.9–12.9)
PMV BLD AUTO: 10.4 FL (ref 8.9–12.9)
PO2 BLD: 295 MMHG (ref 80–100)
PO2 BLD: 357 MMHG (ref 80–100)
PO2 BLDV: 50 MMHG (ref 25–40)
POTASSIUM SERPL-SCNC: 3.9 MMOL/L (ref 3.5–5.1)
POTASSIUM SERPL-SCNC: 4.2 MMOL/L (ref 3.5–5.1)
POTASSIUM SERPL-SCNC: 4.5 MMOL/L (ref 3.5–5.1)
PRESSURE SUPPORT SETTING VENT: 5 CMH2O
PRESSURE SUPPORT SETTING VENT: 5 CMH2O
PROT SERPL-MCNC: 5.4 G/DL (ref 6.4–8.2)
PROT SERPL-MCNC: 6.1 G/DL (ref 6.4–8.2)
PROTHROMBIN TIME: 13.5 SEC (ref 9–11.1)
RBC # BLD AUTO: 4.5 M/UL (ref 4.1–5.7)
RBC # BLD AUTO: 5.68 M/UL (ref 4.1–5.7)
SAO2 % BLD: 100 % (ref 92–97)
SAO2 % BLD: 100 % (ref 92–97)
SAO2 % BLDV: 79 % (ref 65–88)
SERVICE CMNT-IMP: ABNORMAL
SERVICE CMNT-IMP: NORMAL
SODIUM SERPL-SCNC: 139 MMOL/L (ref 136–145)
SODIUM SERPL-SCNC: 141 MMOL/L (ref 136–145)
SODIUM SERPL-SCNC: 142 MMOL/L (ref 136–145)
SPECIMEN TYPE: ABNORMAL
THERAPEUTIC RANGE,PTTT: NORMAL SECS (ref 58–77)
THERAPEUTIC RANGE,PTTT: NORMAL SECS (ref 58–77)
TOTAL RESP. RATE, ITRR: 12
VENTILATION MODE VENT: ABNORMAL
VT SETTING VENT: 600 ML
WBC # BLD AUTO: 18.7 K/UL (ref 4.1–11.1)
WBC # BLD AUTO: 9.5 K/UL (ref 4.1–11.1)

## 2019-07-08 PROCEDURE — 74011000250 HC RX REV CODE- 250

## 2019-07-08 PROCEDURE — 77030002996 HC SUT SLK J&J -A

## 2019-07-08 PROCEDURE — P9045 ALBUMIN (HUMAN), 5%, 250 ML: HCPCS

## 2019-07-08 PROCEDURE — 74011250636 HC RX REV CODE- 250/636: Performed by: ANESTHESIOLOGY

## 2019-07-08 PROCEDURE — 77030018835 HC SOL IRR LR ICUM -A: Performed by: THORACIC SURGERY (CARDIOTHORACIC VASCULAR SURGERY)

## 2019-07-08 PROCEDURE — 77030019579 HC CBL PACE DISP REMG -B: Performed by: THORACIC SURGERY (CARDIOTHORACIC VASCULAR SURGERY)

## 2019-07-08 PROCEDURE — 77030020747 HC TU INSUF ENDOSC TELE -A: Performed by: THORACIC SURGERY (CARDIOTHORACIC VASCULAR SURGERY)

## 2019-07-08 PROCEDURE — 94002 VENT MGMT INPAT INIT DAY: CPT

## 2019-07-08 PROCEDURE — 77030018729 HC ELECTRD DEFIB PAD CARD -B

## 2019-07-08 PROCEDURE — 77030006920 HC BLD STRNL SAW STRY -B: Performed by: THORACIC SURGERY (CARDIOTHORACIC VASCULAR SURGERY)

## 2019-07-08 PROCEDURE — 77030012407 HC DRN WND BARD -B: Performed by: THORACIC SURGERY (CARDIOTHORACIC VASCULAR SURGERY)

## 2019-07-08 PROCEDURE — 82803 BLOOD GASES ANY COMBINATION: CPT

## 2019-07-08 PROCEDURE — 77030022199 HC SYS HARV VESL GTNG -G1: Performed by: THORACIC SURGERY (CARDIOTHORACIC VASCULAR SURGERY)

## 2019-07-08 PROCEDURE — 76060000039 HC ANESTHESIA 4 TO 4.5 HR: Performed by: THORACIC SURGERY (CARDIOTHORACIC VASCULAR SURGERY)

## 2019-07-08 PROCEDURE — 83735 ASSAY OF MAGNESIUM: CPT

## 2019-07-08 PROCEDURE — 74011250636 HC RX REV CODE- 250/636: Performed by: NURSE PRACTITIONER

## 2019-07-08 PROCEDURE — 77030019702 HC WRP THER MENM -C: Performed by: THORACIC SURGERY (CARDIOTHORACIC VASCULAR SURGERY)

## 2019-07-08 PROCEDURE — 77030040361 HC SLV COMPR DVT MDII -B

## 2019-07-08 PROCEDURE — 77030026438 HC STYL ET INTUB CARD -A: Performed by: NURSE ANESTHETIST, CERTIFIED REGISTERED

## 2019-07-08 PROCEDURE — 74011000272 HC RX REV CODE- 272: Performed by: THORACIC SURGERY (CARDIOTHORACIC VASCULAR SURGERY)

## 2019-07-08 PROCEDURE — 77030002986 HC SUT PROL J&J -A: Performed by: THORACIC SURGERY (CARDIOTHORACIC VASCULAR SURGERY)

## 2019-07-08 PROCEDURE — 74011636637 HC RX REV CODE- 636/637: Performed by: THORACIC SURGERY (CARDIOTHORACIC VASCULAR SURGERY)

## 2019-07-08 PROCEDURE — 65610000003 HC RM ICU SURGICAL

## 2019-07-08 PROCEDURE — 77030037878 HC DRSG MEPILEX >48IN BORD MOLN -B: Performed by: THORACIC SURGERY (CARDIOTHORACIC VASCULAR SURGERY)

## 2019-07-08 PROCEDURE — 77030020061 HC IV BLD WRMR ADMIN SET 3M -B: Performed by: NURSE ANESTHETIST, CERTIFIED REGISTERED

## 2019-07-08 PROCEDURE — 77030034848: Performed by: THORACIC SURGERY (CARDIOTHORACIC VASCULAR SURGERY)

## 2019-07-08 PROCEDURE — 77030010819: Performed by: THORACIC SURGERY (CARDIOTHORACIC VASCULAR SURGERY)

## 2019-07-08 PROCEDURE — 77030003010 HC SUT SURG STL J&J -B: Performed by: THORACIC SURGERY (CARDIOTHORACIC VASCULAR SURGERY)

## 2019-07-08 PROCEDURE — 74011000250 HC RX REV CODE- 250: Performed by: NURSE PRACTITIONER

## 2019-07-08 PROCEDURE — 74011250636 HC RX REV CODE- 250/636

## 2019-07-08 PROCEDURE — 77030022521 HC CATH PERF VENT EDWD -B: Performed by: THORACIC SURGERY (CARDIOTHORACIC VASCULAR SURGERY)

## 2019-07-08 PROCEDURE — 77030006247 HC LD PCMKR MYOCRD BPLR TEMP MEDT -B: Performed by: THORACIC SURGERY (CARDIOTHORACIC VASCULAR SURGERY)

## 2019-07-08 PROCEDURE — C1713 ANCHOR/SCREW BN/BN,TIS/BN: HCPCS | Performed by: THORACIC SURGERY (CARDIOTHORACIC VASCULAR SURGERY)

## 2019-07-08 PROCEDURE — 36415 COLL VENOUS BLD VENIPUNCTURE: CPT

## 2019-07-08 PROCEDURE — 77030010389 HC WRE ATR PACE AEMC -B: Performed by: THORACIC SURGERY (CARDIOTHORACIC VASCULAR SURGERY)

## 2019-07-08 PROCEDURE — 0211099 BYPASS CORONARY ARTERY, TWO ARTERIES FROM LEFT INTERNAL MAMMARY WITH AUTOLOGOUS VENOUS TISSUE, OPEN APPROACH: ICD-10-PCS | Performed by: THORACIC SURGERY (CARDIOTHORACIC VASCULAR SURGERY)

## 2019-07-08 PROCEDURE — 85730 THROMBOPLASTIN TIME PARTIAL: CPT

## 2019-07-08 PROCEDURE — 06BP4ZZ EXCISION OF RIGHT SAPHENOUS VEIN, PERCUTANEOUS ENDOSCOPIC APPROACH: ICD-10-PCS | Performed by: THORACIC SURGERY (CARDIOTHORACIC VASCULAR SURGERY)

## 2019-07-08 PROCEDURE — 77030018846 HC SOL IRR STRL H20 ICUM -A: Performed by: THORACIC SURGERY (CARDIOTHORACIC VASCULAR SURGERY)

## 2019-07-08 PROCEDURE — 77030012390 HC DRN CHST BTL GTNG -B: Performed by: THORACIC SURGERY (CARDIOTHORACIC VASCULAR SURGERY)

## 2019-07-08 PROCEDURE — 77030002987 HC SUT PROL J&J -B: Performed by: THORACIC SURGERY (CARDIOTHORACIC VASCULAR SURGERY)

## 2019-07-08 PROCEDURE — C1751 CATH, INF, PER/CENT/MIDLINE: HCPCS

## 2019-07-08 PROCEDURE — 71045 X-RAY EXAM CHEST 1 VIEW: CPT

## 2019-07-08 PROCEDURE — 77030020256 HC SOL INJ NACL 0.9%  500ML: Performed by: THORACIC SURGERY (CARDIOTHORACIC VASCULAR SURGERY)

## 2019-07-08 PROCEDURE — P9047 ALBUMIN (HUMAN), 25%, 50ML: HCPCS

## 2019-07-08 PROCEDURE — 85610 PROTHROMBIN TIME: CPT

## 2019-07-08 PROCEDURE — 77030008684 HC TU ET CUF COVD -B: Performed by: NURSE ANESTHETIST, CERTIFIED REGISTERED

## 2019-07-08 PROCEDURE — 77030037878 HC DRSG MEPILEX >48IN BORD MOLN -B

## 2019-07-08 PROCEDURE — 77030011640 HC PAD GRND REM COVD -A: Performed by: THORACIC SURGERY (CARDIOTHORACIC VASCULAR SURGERY)

## 2019-07-08 PROCEDURE — 74011000636 HC RX REV CODE- 636

## 2019-07-08 PROCEDURE — 77030002933 HC SUT MCRYL J&J -A: Performed by: THORACIC SURGERY (CARDIOTHORACIC VASCULAR SURGERY)

## 2019-07-08 PROCEDURE — 77030005402 HC CATH RAD ART LN KT TELE -B

## 2019-07-08 PROCEDURE — 74011000250 HC RX REV CODE- 250: Performed by: THORACIC SURGERY (CARDIOTHORACIC VASCULAR SURGERY)

## 2019-07-08 PROCEDURE — 74011250637 HC RX REV CODE- 250/637: Performed by: NURSE PRACTITIONER

## 2019-07-08 PROCEDURE — 74011000272 HC RX REV CODE- 272

## 2019-07-08 PROCEDURE — 77030013798 HC KT TRNSDUC PRSSR EDWD -B: Performed by: THORACIC SURGERY (CARDIOTHORACIC VASCULAR SURGERY)

## 2019-07-08 PROCEDURE — 77030018836 HC SOL IRR NACL ICUM -A: Performed by: THORACIC SURGERY (CARDIOTHORACIC VASCULAR SURGERY)

## 2019-07-08 PROCEDURE — 82962 GLUCOSE BLOOD TEST: CPT

## 2019-07-08 PROCEDURE — 77030007667 HC INSRT SUT HLD MEDT -B: Performed by: THORACIC SURGERY (CARDIOTHORACIC VASCULAR SURGERY)

## 2019-07-08 PROCEDURE — 80053 COMPREHEN METABOLIC PANEL: CPT

## 2019-07-08 PROCEDURE — 76010000114 HC CV SURG 4 TO 4.5 HR: Performed by: THORACIC SURGERY (CARDIOTHORACIC VASCULAR SURGERY)

## 2019-07-08 PROCEDURE — 74011250636 HC RX REV CODE- 250/636: Performed by: THORACIC SURGERY (CARDIOTHORACIC VASCULAR SURGERY)

## 2019-07-08 PROCEDURE — 74011000258 HC RX REV CODE- 258: Performed by: NURSE PRACTITIONER

## 2019-07-08 PROCEDURE — 77030003020 HC SUT TICRN COVD -A: Performed by: THORACIC SURGERY (CARDIOTHORACIC VASCULAR SURGERY)

## 2019-07-08 PROCEDURE — 74011000258 HC RX REV CODE- 258

## 2019-07-08 PROCEDURE — 85025 COMPLETE CBC W/AUTO DIFF WBC: CPT

## 2019-07-08 PROCEDURE — 77030006689 HC BLD OPHTH BVR BD -A: Performed by: THORACIC SURGERY (CARDIOTHORACIC VASCULAR SURGERY)

## 2019-07-08 PROCEDURE — 77030010605 HC BLWR MR MAL MEDT -B: Performed by: THORACIC SURGERY (CARDIOTHORACIC VASCULAR SURGERY)

## 2019-07-08 PROCEDURE — 93355 ECHO TRANSESOPHAGEAL (TEE): CPT | Performed by: THORACIC SURGERY (CARDIOTHORACIC VASCULAR SURGERY)

## 2019-07-08 PROCEDURE — 77030013862 HC PNCH AORT GENZ -B: Performed by: THORACIC SURGERY (CARDIOTHORACIC VASCULAR SURGERY)

## 2019-07-08 PROCEDURE — 74011000258 HC RX REV CODE- 258: Performed by: THORACIC SURGERY (CARDIOTHORACIC VASCULAR SURGERY)

## 2019-07-08 PROCEDURE — 77030011255 HC DSG AQUACEL AG BMS -A: Performed by: THORACIC SURGERY (CARDIOTHORACIC VASCULAR SURGERY)

## 2019-07-08 PROCEDURE — 77030010938 HC CLP LIG TELE -A: Performed by: THORACIC SURGERY (CARDIOTHORACIC VASCULAR SURGERY)

## 2019-07-08 PROCEDURE — P9045 ALBUMIN (HUMAN), 5%, 250 ML: HCPCS | Performed by: NURSE PRACTITIONER

## 2019-07-08 PROCEDURE — 77030039266 HC ADH SKN EXOFIN S2SG -A: Performed by: THORACIC SURGERY (CARDIOTHORACIC VASCULAR SURGERY)

## 2019-07-08 PROCEDURE — 77030020263 HC SOL INJ SOD CL0.9% LFCR 1000ML: Performed by: THORACIC SURGERY (CARDIOTHORACIC VASCULAR SURGERY)

## 2019-07-08 PROCEDURE — 85018 HEMOGLOBIN: CPT

## 2019-07-08 RX ORDER — HEPARIN SODIUM 1000 [USP'U]/ML
INJECTION, SOLUTION INTRAVENOUS; SUBCUTANEOUS AS NEEDED
Status: DISCONTINUED | OUTPATIENT
Start: 2019-07-08 | End: 2019-07-08 | Stop reason: HOSPADM

## 2019-07-08 RX ORDER — MAGNESIUM SULFATE 100 %
4 CRYSTALS MISCELLANEOUS AS NEEDED
Status: DISCONTINUED | OUTPATIENT
Start: 2019-07-08 | End: 2019-07-11

## 2019-07-08 RX ORDER — GUAIFENESIN 100 MG/5ML
81 LIQUID (ML) ORAL DAILY
Status: DISCONTINUED | OUTPATIENT
Start: 2019-07-09 | End: 2019-07-12 | Stop reason: HOSPADM

## 2019-07-08 RX ORDER — SODIUM CHLORIDE 0.9 % (FLUSH) 0.9 %
5-40 SYRINGE (ML) INJECTION EVERY 8 HOURS
Status: DISCONTINUED | OUTPATIENT
Start: 2019-07-08 | End: 2019-07-09

## 2019-07-08 RX ORDER — ALBUTEROL SULFATE 0.83 MG/ML
2.5 SOLUTION RESPIRATORY (INHALATION)
Status: DISCONTINUED | OUTPATIENT
Start: 2019-07-08 | End: 2019-07-12 | Stop reason: HOSPADM

## 2019-07-08 RX ORDER — AMOXICILLIN 250 MG
1 CAPSULE ORAL 2 TIMES DAILY
Status: DISCONTINUED | OUTPATIENT
Start: 2019-07-09 | End: 2019-07-12 | Stop reason: HOSPADM

## 2019-07-08 RX ORDER — CEFAZOLIN SODIUM 1 G/3ML
INJECTION, POWDER, FOR SOLUTION INTRAMUSCULAR; INTRAVENOUS AS NEEDED
Status: DISCONTINUED | OUTPATIENT
Start: 2019-07-08 | End: 2019-07-08 | Stop reason: HOSPADM

## 2019-07-08 RX ORDER — ROCURONIUM BROMIDE 10 MG/ML
INJECTION, SOLUTION INTRAVENOUS AS NEEDED
Status: DISCONTINUED | OUTPATIENT
Start: 2019-07-08 | End: 2019-07-08 | Stop reason: HOSPADM

## 2019-07-08 RX ORDER — ALBUMIN HUMAN 50 G/1000ML
SOLUTION INTRAVENOUS AS NEEDED
Status: DISCONTINUED | OUTPATIENT
Start: 2019-07-08 | End: 2019-07-08 | Stop reason: HOSPADM

## 2019-07-08 RX ORDER — INSULIN LISPRO 100 [IU]/ML
INJECTION, SOLUTION INTRAVENOUS; SUBCUTANEOUS
Status: DISCONTINUED | OUTPATIENT
Start: 2019-07-08 | End: 2019-07-10

## 2019-07-08 RX ORDER — HEPARIN SODIUM 1000 [USP'U]/ML
2000 INJECTION, SOLUTION INTRAVENOUS; SUBCUTANEOUS ONCE
Status: COMPLETED | OUTPATIENT
Start: 2019-07-08 | End: 2019-07-08

## 2019-07-08 RX ORDER — SODIUM CHLORIDE, SODIUM LACTATE, POTASSIUM CHLORIDE, CALCIUM CHLORIDE 600; 310; 30; 20 MG/100ML; MG/100ML; MG/100ML; MG/100ML
INJECTION, SOLUTION INTRAVENOUS
Status: DISCONTINUED | OUTPATIENT
Start: 2019-07-08 | End: 2019-07-08 | Stop reason: HOSPADM

## 2019-07-08 RX ORDER — MUPIROCIN 20 MG/G
OINTMENT TOPICAL 2 TIMES DAILY
Status: DISCONTINUED | OUTPATIENT
Start: 2019-07-08 | End: 2019-07-12 | Stop reason: HOSPADM

## 2019-07-08 RX ORDER — ONDANSETRON 2 MG/ML
4 INJECTION INTRAMUSCULAR; INTRAVENOUS
Status: DISCONTINUED | OUTPATIENT
Start: 2019-07-08 | End: 2019-07-12 | Stop reason: HOSPADM

## 2019-07-08 RX ORDER — POTASSIUM CHLORIDE 29.8 MG/ML
20 INJECTION INTRAVENOUS
Status: ACTIVE | OUTPATIENT
Start: 2019-07-08 | End: 2019-07-09

## 2019-07-08 RX ORDER — PAPAVERINE HYDROCHLORIDE 30 MG/ML
10 INJECTION INTRAMUSCULAR; INTRAVENOUS ONCE
Status: COMPLETED | OUTPATIENT
Start: 2019-07-08 | End: 2019-07-08

## 2019-07-08 RX ORDER — MORPHINE SULFATE 4 MG/ML
4 INJECTION INTRAVENOUS
Status: DISCONTINUED | OUTPATIENT
Start: 2019-07-08 | End: 2019-07-09

## 2019-07-08 RX ORDER — CEFAZOLIN SODIUM 1 G/3ML
1 INJECTION, POWDER, FOR SOLUTION INTRAMUSCULAR; INTRAVENOUS ONCE
Status: COMPLETED | OUTPATIENT
Start: 2019-07-08 | End: 2019-07-08

## 2019-07-08 RX ORDER — SODIUM CHLORIDE 450 MG/100ML
10 INJECTION, SOLUTION INTRAVENOUS CONTINUOUS
Status: DISCONTINUED | OUTPATIENT
Start: 2019-07-08 | End: 2019-07-09

## 2019-07-08 RX ORDER — NALOXONE HYDROCHLORIDE 0.4 MG/ML
0.4 INJECTION, SOLUTION INTRAMUSCULAR; INTRAVENOUS; SUBCUTANEOUS AS NEEDED
Status: DISCONTINUED | OUTPATIENT
Start: 2019-07-08 | End: 2019-07-12 | Stop reason: HOSPADM

## 2019-07-08 RX ORDER — DIPHENHYDRAMINE HYDROCHLORIDE 50 MG/ML
25 INJECTION, SOLUTION INTRAMUSCULAR; INTRAVENOUS
Status: DISCONTINUED | OUTPATIENT
Start: 2019-07-08 | End: 2019-07-12 | Stop reason: HOSPADM

## 2019-07-08 RX ORDER — ALBUMIN HUMAN 50 G/1000ML
12.5 SOLUTION INTRAVENOUS
Status: COMPLETED | OUTPATIENT
Start: 2019-07-08 | End: 2019-07-09

## 2019-07-08 RX ORDER — SODIUM CHLORIDE 9 MG/ML
INJECTION, SOLUTION INTRAVENOUS
Status: DISCONTINUED | OUTPATIENT
Start: 2019-07-08 | End: 2019-07-08 | Stop reason: HOSPADM

## 2019-07-08 RX ORDER — MAGNESIUM SULFATE 1 G/100ML
1 INJECTION INTRAVENOUS AS NEEDED
Status: DISCONTINUED | OUTPATIENT
Start: 2019-07-08 | End: 2019-07-09

## 2019-07-08 RX ORDER — PHENYLEPHRINE HCL IN 0.9% NACL 0.4MG/10ML
SYRINGE (ML) INTRAVENOUS AS NEEDED
Status: DISCONTINUED | OUTPATIENT
Start: 2019-07-08 | End: 2019-07-08 | Stop reason: HOSPADM

## 2019-07-08 RX ORDER — DEXTROSE 50 % IN WATER (D50W) INTRAVENOUS SYRINGE
12.5-25 AS NEEDED
Status: DISCONTINUED | OUTPATIENT
Start: 2019-07-08 | End: 2019-07-12 | Stop reason: HOSPADM

## 2019-07-08 RX ORDER — OXYCODONE AND ACETAMINOPHEN 5; 325 MG/1; MG/1
2 TABLET ORAL
Status: DISCONTINUED | OUTPATIENT
Start: 2019-07-08 | End: 2019-07-12 | Stop reason: HOSPADM

## 2019-07-08 RX ORDER — LANOLIN ALCOHOL/MO/W.PET/CERES
3 CREAM (GRAM) TOPICAL
Status: DISCONTINUED | OUTPATIENT
Start: 2019-07-08 | End: 2019-07-12 | Stop reason: HOSPADM

## 2019-07-08 RX ORDER — DIPHENHYDRAMINE HCL 25 MG
25 CAPSULE ORAL
Status: DISCONTINUED | OUTPATIENT
Start: 2019-07-08 | End: 2019-07-12 | Stop reason: HOSPADM

## 2019-07-08 RX ORDER — CHLORHEXIDINE GLUCONATE 1.2 MG/ML
10 RINSE ORAL 2 TIMES DAILY
Status: DISCONTINUED | OUTPATIENT
Start: 2019-07-08 | End: 2019-07-12 | Stop reason: HOSPADM

## 2019-07-08 RX ORDER — SUFENTANIL CITRATE 50 UG/ML
INJECTION EPIDURAL; INTRAVENOUS AS NEEDED
Status: DISCONTINUED | OUTPATIENT
Start: 2019-07-08 | End: 2019-07-08 | Stop reason: HOSPADM

## 2019-07-08 RX ORDER — FAMOTIDINE 20 MG/1
20 TABLET, FILM COATED ORAL EVERY 12 HOURS
Status: DISCONTINUED | OUTPATIENT
Start: 2019-07-09 | End: 2019-07-12 | Stop reason: HOSPADM

## 2019-07-08 RX ORDER — PROPOFOL 10 MG/ML
INJECTION, EMULSION INTRAVENOUS AS NEEDED
Status: DISCONTINUED | OUTPATIENT
Start: 2019-07-08 | End: 2019-07-08 | Stop reason: HOSPADM

## 2019-07-08 RX ORDER — POLYETHYLENE GLYCOL 3350 17 G/17G
17 POWDER, FOR SOLUTION ORAL DAILY
Status: DISCONTINUED | OUTPATIENT
Start: 2019-07-09 | End: 2019-07-12 | Stop reason: HOSPADM

## 2019-07-08 RX ORDER — DESMOPRESSIN ACETATE 4 UG/ML
INJECTION, SOLUTION INTRAVENOUS; SUBCUTANEOUS AS NEEDED
Status: DISCONTINUED | OUTPATIENT
Start: 2019-07-08 | End: 2019-07-08 | Stop reason: HOSPADM

## 2019-07-08 RX ORDER — INSULIN GLARGINE 100 [IU]/ML
1-50 INJECTION, SOLUTION SUBCUTANEOUS
Status: ACTIVE | OUTPATIENT
Start: 2019-07-08 | End: 2019-07-09

## 2019-07-08 RX ORDER — MORPHINE SULFATE 2 MG/ML
2 INJECTION, SOLUTION INTRAMUSCULAR; INTRAVENOUS
Status: DISCONTINUED | OUTPATIENT
Start: 2019-07-08 | End: 2019-07-09

## 2019-07-08 RX ORDER — DEXMEDETOMIDINE HYDROCHLORIDE 4 UG/ML
INJECTION, SOLUTION INTRAVENOUS
Status: DISCONTINUED | OUTPATIENT
Start: 2019-07-08 | End: 2019-07-08 | Stop reason: HOSPADM

## 2019-07-08 RX ORDER — FENTANYL CITRATE 50 UG/ML
25 INJECTION, SOLUTION INTRAMUSCULAR; INTRAVENOUS ONCE
Status: COMPLETED | OUTPATIENT
Start: 2019-07-08 | End: 2019-07-08

## 2019-07-08 RX ORDER — OXYCODONE AND ACETAMINOPHEN 5; 325 MG/1; MG/1
1 TABLET ORAL
Status: DISCONTINUED | OUTPATIENT
Start: 2019-07-08 | End: 2019-07-12 | Stop reason: HOSPADM

## 2019-07-08 RX ORDER — MIDAZOLAM HYDROCHLORIDE 1 MG/ML
1 INJECTION, SOLUTION INTRAMUSCULAR; INTRAVENOUS ONCE
Status: COMPLETED | OUTPATIENT
Start: 2019-07-08 | End: 2019-07-08

## 2019-07-08 RX ORDER — SODIUM CHLORIDE 0.9 % (FLUSH) 0.9 %
5-40 SYRINGE (ML) INJECTION AS NEEDED
Status: DISCONTINUED | OUTPATIENT
Start: 2019-07-08 | End: 2019-07-09

## 2019-07-08 RX ORDER — SODIUM CHLORIDE, SODIUM LACTATE, POTASSIUM CHLORIDE, CALCIUM CHLORIDE 600; 310; 30; 20 MG/100ML; MG/100ML; MG/100ML; MG/100ML
25 INJECTION, SOLUTION INTRAVENOUS CONTINUOUS
Status: DISCONTINUED | OUTPATIENT
Start: 2019-07-08 | End: 2019-07-08 | Stop reason: HOSPADM

## 2019-07-08 RX ORDER — AMIODARONE HYDROCHLORIDE 200 MG/1
400 TABLET ORAL EVERY 12 HOURS
Status: DISCONTINUED | OUTPATIENT
Start: 2019-07-09 | End: 2019-07-12 | Stop reason: HOSPADM

## 2019-07-08 RX ORDER — FACIAL-BODY WIPES
10 EACH TOPICAL DAILY PRN
Status: DISCONTINUED | OUTPATIENT
Start: 2019-07-08 | End: 2019-07-12 | Stop reason: HOSPADM

## 2019-07-08 RX ORDER — SODIUM CHLORIDE 0.9 % (FLUSH) 0.9 %
5-40 SYRINGE (ML) INJECTION AS NEEDED
Status: DISCONTINUED | OUTPATIENT
Start: 2019-07-08 | End: 2019-07-08 | Stop reason: HOSPADM

## 2019-07-08 RX ORDER — LANOLIN ALCOHOL/MO/W.PET/CERES
400 CREAM (GRAM) TOPICAL 2 TIMES DAILY
Status: DISCONTINUED | OUTPATIENT
Start: 2019-07-09 | End: 2019-07-12 | Stop reason: HOSPADM

## 2019-07-08 RX ORDER — LIDOCAINE HYDROCHLORIDE 20 MG/ML
INJECTION, SOLUTION EPIDURAL; INFILTRATION; INTRACAUDAL; PERINEURAL AS NEEDED
Status: DISCONTINUED | OUTPATIENT
Start: 2019-07-08 | End: 2019-07-08 | Stop reason: HOSPADM

## 2019-07-08 RX ORDER — CEFAZOLIN SODIUM/WATER 2 G/20 ML
2 SYRINGE (ML) INTRAVENOUS EVERY 6 HOURS
Status: DISCONTINUED | OUTPATIENT
Start: 2019-07-08 | End: 2019-07-08 | Stop reason: SDUPTHER

## 2019-07-08 RX ORDER — SODIUM CHLORIDE 0.9 % (FLUSH) 0.9 %
5-40 SYRINGE (ML) INJECTION EVERY 8 HOURS
Status: DISCONTINUED | OUTPATIENT
Start: 2019-07-08 | End: 2019-07-08 | Stop reason: HOSPADM

## 2019-07-08 RX ORDER — CEFAZOLIN SODIUM/WATER 2 G/20 ML
2 SYRINGE (ML) INTRAVENOUS EVERY 6 HOURS
Status: COMPLETED | OUTPATIENT
Start: 2019-07-09 | End: 2019-07-10

## 2019-07-08 RX ORDER — INSULIN LISPRO 100 [IU]/ML
INJECTION, SOLUTION INTRAVENOUS; SUBCUTANEOUS
Status: DISCONTINUED | OUTPATIENT
Start: 2019-07-08 | End: 2019-07-11

## 2019-07-08 RX ORDER — SODIUM CHLORIDE 9 MG/ML
9 INJECTION, SOLUTION INTRAVENOUS CONTINUOUS
Status: DISCONTINUED | OUTPATIENT
Start: 2019-07-08 | End: 2019-07-09

## 2019-07-08 RX ORDER — PROTAMINE SULFATE 10 MG/ML
INJECTION, SOLUTION INTRAVENOUS AS NEEDED
Status: DISCONTINUED | OUTPATIENT
Start: 2019-07-08 | End: 2019-07-08 | Stop reason: HOSPADM

## 2019-07-08 RX ORDER — ACETAMINOPHEN 325 MG/1
650 TABLET ORAL EVERY 4 HOURS
Status: DISPENSED | OUTPATIENT
Start: 2019-07-08 | End: 2019-07-09

## 2019-07-08 RX ORDER — MIDAZOLAM HYDROCHLORIDE 1 MG/ML
1 INJECTION, SOLUTION INTRAMUSCULAR; INTRAVENOUS
Status: DISCONTINUED | OUTPATIENT
Start: 2019-07-08 | End: 2019-07-09

## 2019-07-08 RX ORDER — BACITRACIN 500 UNIT/G
1 PACKET (EA) TOPICAL AS NEEDED
Status: DISCONTINUED | OUTPATIENT
Start: 2019-07-08 | End: 2019-07-09

## 2019-07-08 RX ORDER — SUFENTANIL CITRATE 50 UG/ML
INJECTION EPIDURAL; INTRAVENOUS
Status: DISCONTINUED | OUTPATIENT
Start: 2019-07-08 | End: 2019-07-08 | Stop reason: HOSPADM

## 2019-07-08 RX ADMIN — Medication 20 ML: at 12:34

## 2019-07-08 RX ADMIN — Medication 40 MCG: at 08:39

## 2019-07-08 RX ADMIN — SUFENTANIL CITRATE 20 MCG: 50 INJECTION EPIDURAL; INTRAVENOUS at 08:27

## 2019-07-08 RX ADMIN — SODIUM CHLORIDE 1.7 UNITS/HR: 900 INJECTION, SOLUTION INTRAVENOUS at 18:01

## 2019-07-08 RX ADMIN — SODIUM CHLORIDE 9 ML/HR: 900 INJECTION, SOLUTION INTRAVENOUS at 12:00

## 2019-07-08 RX ADMIN — MUPIROCIN: 20 OINTMENT TOPICAL at 13:00

## 2019-07-08 RX ADMIN — Medication 2 G: at 19:05

## 2019-07-08 RX ADMIN — ALBUMIN HUMAN 250 ML: 50 SOLUTION INTRAVENOUS at 11:25

## 2019-07-08 RX ADMIN — SODIUM CHLORIDE: 9 INJECTION, SOLUTION INTRAVENOUS at 07:40

## 2019-07-08 RX ADMIN — PROPOFOL 160 MG: 10 INJECTION, EMULSION INTRAVENOUS at 07:43

## 2019-07-08 RX ADMIN — HEPARIN SODIUM 20000 UNITS: 1000 INJECTION, SOLUTION INTRAVENOUS; SUBCUTANEOUS at 08:59

## 2019-07-08 RX ADMIN — DEXMEDETOMIDINE HYDROCHLORIDE 0.2 MCG/KG/HR: 4 INJECTION, SOLUTION INTRAVENOUS at 09:40

## 2019-07-08 RX ADMIN — HEPARIN SODIUM 2000 UNITS: 1000 INJECTION, SOLUTION INTRAVENOUS; SUBCUTANEOUS at 08:25

## 2019-07-08 RX ADMIN — FAMOTIDINE 20 MG: 10 INJECTION, SOLUTION INTRAVENOUS at 13:00

## 2019-07-08 RX ADMIN — SODIUM CHLORIDE 2.6 UNITS/HR: 900 INJECTION, SOLUTION INTRAVENOUS at 12:20

## 2019-07-08 RX ADMIN — LIDOCAINE HYDROCHLORIDE 60 MG: 20 INJECTION, SOLUTION EPIDURAL; INFILTRATION; INTRACAUDAL; PERINEURAL at 07:43

## 2019-07-08 RX ADMIN — Medication 10 ML: at 21:13

## 2019-07-08 RX ADMIN — MUPIROCIN: 20 OINTMENT TOPICAL at 20:50

## 2019-07-08 RX ADMIN — AMIODARONE HYDROCHLORIDE 1 MG/MIN: 50 INJECTION, SOLUTION INTRAVENOUS at 22:53

## 2019-07-08 RX ADMIN — MIDAZOLAM HYDROCHLORIDE 3 MG: 1 INJECTION, SOLUTION INTRAMUSCULAR; INTRAVENOUS at 07:12

## 2019-07-08 RX ADMIN — SUFENTANIL CITRATE 15 MCG: 50 INJECTION EPIDURAL; INTRAVENOUS at 08:30

## 2019-07-08 RX ADMIN — HEPARIN SODIUM 35000 UNITS: 1000 INJECTION, SOLUTION INTRAVENOUS; SUBCUTANEOUS at 08:39

## 2019-07-08 RX ADMIN — FAMOTIDINE 20 MG: 10 INJECTION, SOLUTION INTRAVENOUS at 20:50

## 2019-07-08 RX ADMIN — SUFENTANIL CITRATE 20 MCG: 50 INJECTION EPIDURAL; INTRAVENOUS at 11:48

## 2019-07-08 RX ADMIN — ALBUMIN (HUMAN) 12.5 G: 12.5 INJECTION, SOLUTION INTRAVENOUS at 21:18

## 2019-07-08 RX ADMIN — PROTAMINE SULFATE 500 MG: 10 INJECTION, SOLUTION INTRAVENOUS at 10:24

## 2019-07-08 RX ADMIN — SUFENTANIL CITRATE 25 MCG: 50 INJECTION EPIDURAL; INTRAVENOUS at 07:43

## 2019-07-08 RX ADMIN — CHLORHEXIDINE GLUCONATE 10 ML: 1.2 RINSE ORAL at 13:00

## 2019-07-08 RX ADMIN — DESMOPRESSIN ACETATE 25 MCG: 4 INJECTION, SOLUTION INTRAVENOUS; SUBCUTANEOUS at 11:28

## 2019-07-08 RX ADMIN — Medication 40 MCG: at 08:38

## 2019-07-08 RX ADMIN — SODIUM CHLORIDE, SODIUM LACTATE, POTASSIUM CHLORIDE, CALCIUM CHLORIDE: 600; 310; 30; 20 INJECTION, SOLUTION INTRAVENOUS at 07:40

## 2019-07-08 RX ADMIN — Medication 80 MCG: at 07:47

## 2019-07-08 RX ADMIN — MORPHINE SULFATE 2 MG: 2 INJECTION, SOLUTION INTRAMUSCULAR; INTRAVENOUS at 12:51

## 2019-07-08 RX ADMIN — SUFENTANIL CITRATE 0.3 MCG/KG/HR: 50 INJECTION EPIDURAL; INTRAVENOUS at 07:42

## 2019-07-08 RX ADMIN — ROCURONIUM BROMIDE 20 MG: 10 INJECTION, SOLUTION INTRAVENOUS at 09:40

## 2019-07-08 RX ADMIN — SODIUM CHLORIDE 10 ML/HR: 450 INJECTION, SOLUTION INTRAVENOUS at 12:31

## 2019-07-08 RX ADMIN — SODIUM CHLORIDE, SODIUM LACTATE, POTASSIUM CHLORIDE, AND CALCIUM CHLORIDE 25 ML/HR: 600; 310; 30; 20 INJECTION, SOLUTION INTRAVENOUS at 06:30

## 2019-07-08 RX ADMIN — SODIUM CHLORIDE: 9 INJECTION, SOLUTION INTRAVENOUS at 10:48

## 2019-07-08 RX ADMIN — ROCURONIUM BROMIDE 50 MG: 10 INJECTION, SOLUTION INTRAVENOUS at 07:43

## 2019-07-08 RX ADMIN — HEPARIN SODIUM 20000 UNITS: 1000 INJECTION, SOLUTION INTRAVENOUS; SUBCUTANEOUS at 09:23

## 2019-07-08 RX ADMIN — CEFAZOLIN SODIUM 2 G: 1 INJECTION, POWDER, FOR SOLUTION INTRAMUSCULAR; INTRAVENOUS at 08:22

## 2019-07-08 RX ADMIN — PROPOFOL 40 MG: 10 INJECTION, EMULSION INTRAVENOUS at 08:29

## 2019-07-08 RX ADMIN — OXYCODONE HYDROCHLORIDE AND ACETAMINOPHEN 1 TABLET: 5; 325 TABLET ORAL at 20:51

## 2019-07-08 RX ADMIN — AMIODARONE HYDROCHLORIDE 1 MG/MIN: 50 INJECTION, SOLUTION INTRAVENOUS at 16:58

## 2019-07-08 RX ADMIN — SODIUM CHLORIDE 0.4 MCG/KG/HR: 900 INJECTION, SOLUTION INTRAVENOUS at 22:53

## 2019-07-08 RX ADMIN — FENTANYL CITRATE 50 MCG: 50 INJECTION, SOLUTION INTRAMUSCULAR; INTRAVENOUS at 06:40

## 2019-07-08 RX ADMIN — PHENYLEPHRINE HYDROCHLORIDE 40 MCG/MIN: 10 INJECTION INTRAVENOUS at 20:23

## 2019-07-08 RX ADMIN — CHLORHEXIDINE GLUCONATE 10 ML: 1.2 RINSE ORAL at 20:50

## 2019-07-08 RX ADMIN — CEFAZOLIN SODIUM 2 G: 1 INJECTION, POWDER, FOR SOLUTION INTRAMUSCULAR; INTRAVENOUS at 11:19

## 2019-07-08 NOTE — PROGRESS NOTES
Cardiac Surgery Care Coordinator- No family available for update, Mr Quan Schultz previously stated he would not have anyone to update. Will continue to follow for educational and emotional support.  Jag Munroe RN

## 2019-07-08 NOTE — ANESTHESIA PREPROCEDURE EVALUATION
Relevant Problems   No relevant active problems       Anesthetic History   No history of anesthetic complications            Review of Systems / Medical History  Patient summary reviewed, nursing notes reviewed and pertinent labs reviewed    Pulmonary  Within defined limits                 Neuro/Psych   Within defined limits           Cardiovascular  Within defined limits            Past MI and CAD    Exercise tolerance: >4 METS     GI/Hepatic/Renal  Within defined limits   GERD: well controlled          Comments: [facility] Endo/Other  Within defined limits           Other Findings              Physical Exam    Airway  Mallampati: II  TM Distance: > 6 cm  Neck ROM: normal range of motion   Mouth opening: Normal     Cardiovascular  Regular rate and rhythm,  S1 and S2 normal,  no murmur, click, rub, or gallop             Dental  No notable dental hx       Pulmonary  Breath sounds clear to auscultation               Abdominal  GI exam deferred       Other Findings            Anesthetic Plan    ASA: 4  Anesthesia type: general    Monitoring Plan: Arterial line, BIS, CVP, [place]-Anisha and BERTRAND      Induction: Intravenous  Anesthetic plan and risks discussed with: Patient

## 2019-07-08 NOTE — PROGRESS NOTES
1930  Bedside shift change report given to Terry Olsen (oncoming nurse) by Delmy Brandt (offgoing nurse). Report included the following information SBAR, Kardex, Procedure Summary, Intake/Output, MAR and Recent Results. 0000 Heparin discontinued. Pt aware he is NPO.    0530  Transported by [employer] via stretcher. TRANSFER - OUT REPORT:    Verbal report given to Brea(name) on Colin Camarena  being transferred to [facility] (unit) for ordered procedure   Accompanied by this RN. Report consisted of patients Situation, Background, Assessment and   Recommendations(SBAR). Information from the following report(s) SBAR, Kardex, Procedure Summary, Intake/Output, MAR, Recent Results and Cardiac Rhythm NSR with 1st degree AV block. was reviewed with the receiving nurse. Lines:   Peripheral IV 07/03/19 Right Antecubital (Active)   Site Assessment Clean, dry, & intact 7/7/2019  8:00 PM   Phlebitis Assessment 0 7/7/2019  8:00 PM   Infiltration Assessment 0 7/7/2019  8:00 PM   Dressing Status Clean, dry, & intact 7/7/2019  8:00 PM   Dressing Type Transparent 7/7/2019  8:00 PM   Hub Color/Line Status Pink 7/7/2019  8:00 PM   Action Taken Open ports on tubing capped 7/7/2019  8:00 PM   Alcohol Cap Used Yes 7/7/2019  8:00 PM       Peripheral IV 07/04/19 Left Forearm (Active)   Site Assessment Clean, dry, & intact 7/7/2019  8:00 PM   Phlebitis Assessment 0 7/7/2019  8:00 PM   Infiltration Assessment 0 7/7/2019  8:00 PM   Dressing Status Clean, dry, & intact 7/7/2019  8:00 PM   Dressing Type Transparent 7/7/2019  8:00 PM   Hub Color/Line Status Pink 7/7/2019  8:00 PM   Action Taken Open ports on tubing capped 7/7/2019  8:00 PM   Alcohol Cap Used Yes 7/7/2019  8:00 PM        Opportunity for questions and clarification was provided.       Patient transported with:   Registered Nurse  Tech

## 2019-07-08 NOTE — ANESTHESIA PROCEDURE NOTES
Arterial Line Placement    Start time: 7/8/2019 6:31 AM  End time: 7/8/2019 6:41 AM  Performed by: Huber Campbell MD  Authorized by: Huber Campbell MD     Pre-Procedure  Indications:  Arterial pressure monitoring and blood sampling  Preanesthetic Checklist: patient identified, risks and benefits discussed, anesthesia consent, site marked, patient being monitored, timeout performed and patient being monitored    Timeout Time: 06:31        Procedure:   Prep:  Alcohol and ChloraPrep  Seldinger Technique?: Yes    Orientation:  Right  Location:  Radial artery  Catheter size:  20 G  Number of attempts:  1  Cont Cardiac Output Sensor: No      Assessment:   Post-procedure:  Line secured and sterile dressing applied  Patient Tolerance:  Patient tolerated the procedure well with no immediate complications

## 2019-07-08 NOTE — PROGRESS NOTES
Occupational Therapy   Orders received, chart review completed. Note patient POD #0 from CABG X 3 WITH CPB. Per pathway, OT will follow up on POD #1 for evaluation. Recommend OOB to chair three times a day for meals and self-completion of ADLs as able and medically stable. Thank you.

## 2019-07-08 NOTE — PROGRESS NOTES
The patient is s/p CABG- just came from OR to CVICU. The patient is currently intubated, CM will follow-up later as able and appropriate for assessment. Per H&P, the patient lives at home in [place], [place], with is large dogs. CM will continue to follow for transitions of care- formal assessment to follow.  DEBRA Brown

## 2019-07-08 NOTE — PERIOP NOTES
TRANSFER - IN REPORT:    Verbal report received from Jairo okeefe Mckenzie Freddie  being received from [phone] for routine progression of care      Report consisted of patients Situation, Background, Assessment and   Recommendations(SBAR). Information from the following report(s) SBAR, Intake/Output, MAR and Recent Results was reviewed with the receiving nurse. Opportunity for questions and clarification was provided. Assessment completed upon patients arrival to unit and care assumed.

## 2019-07-08 NOTE — ANESTHESIA PROCEDURE NOTES
Central Line Placement    Start time: 7/8/2019 6:42 AM  End time: 7/8/2019 7:10 AM  Performed by: Marcus Ferreira MD  Authorized by: Marcus Ferreira MD     Indications: vascular access, central pressure monitoring and need for vasopressors  Preanesthetic Checklist: patient identified, risks and benefits discussed, anesthesia consent, site marked, patient being monitored and timeout performed    Timeout Time: 06:42       Pre-procedure: All elements of maximal sterile barrier technique followed? Yes    2% Chlorhexidine for cutaneous antisepsis, Hand hygiene performed prior to catheter insertion and Ultrasound guidance    Sterile Ultrasound Technique followed?: Yes            Procedure:   Prep:  Chlorhexidine  Location:  Internal jugular  Orientation:  Right  Patient position:  Trendelenburg  Catheter type:  Double lumen  Catheter size:  9 Fr  Catheter length:  16 cm  Number of attempts:  1  Successful placement: Yes      Assessment:   Post-procedure:  Catheter secured, sterile dressing applied and sterile dressing with CHG applied  Assessment:  Blood return through all ports, free fluid flow and guidewire removal verified  Insertion:  Uncomplicated  Patient tolerance:  Patient tolerated the procedure well with no immediate complications  Pulmonary Artery Catheter Advancement    With sterile sheath in place, PAC was advanced using pressure monitoring to approximately 51 cm. No complications. CXR:  Pending.

## 2019-07-08 NOTE — PERIOP NOTES
Patient stated his full name, date of birth and procedure in pre-op holding; Pt to OR via stretcher. With all wheels locked, pt transferred to OR table via slide board by anesthesia and OR staff X 3.   Right IJ [place] jam[place] and Right radial arterial line placed in pre-op holding and intact.

## 2019-07-08 NOTE — BRIEF OP NOTE
BRIEF OP NOTE  Pre-Op Diagnosis: CAD    Post-Op Diagnosis: CAD      Procedure: Procedure(s):  CABG X 3 WITH CPB (LIMA-LAD, SVG-OM, SVG-dRCA); LEFT LE SAPHENOUS VEIN VIA EVH    Surgeon: Jayesh    Assistant(s): GHULAM Velazquez    Anesthesia: General     Infusions: Amiodarone, precedex, insulin, catia    Estimated Blood Loss: 1L    Cell Saver: 675 mL    Specimens: * No specimens in log *    Drains and pacing wires: 2 atrial wires, 1 bipolar ventricular wire, 3 blu drains    Complications: None    Findings: CAD    Implants: * No implants in log *    GHULAM Velazquez

## 2019-07-08 NOTE — PERIOP NOTES
Report called to [facility] Belinda, CVICU RN. Information given to include patient's name, age, allergies, height, weight, PMH, invasive lines, procedure, drains and anesthesia drips.

## 2019-07-08 NOTE — PROGRESS NOTES
1200 - TRANSFER - IN REPORT:    Verbal report received from PA & NITA(name) on Antonio Rios  being received from [facility](unit) for routine post - op      Report consisted of patients Situation, Background, Assessment and   Recommendations(SBAR). Information from the following report(s) SBAR, OR Summary, Intake/Output, Recent Results and Cardiac Rhythm paced was reviewed with the receiving nurse. Opportunity for questions and clarification was provided. Assessment completed upon patients arrival to unit and care assumed. Pt placed on CVICU monitors and vent. Bilat restraints applied to wrists. RASS -5. Arrived on catia, insulin, precedex, amiodarone. 1230 - stat labs and pcxr done. MV drawn = 79. Vent IMV rate increased from 12 to 16 for PCO2 of 50 and fio2 decreased from 80 to 50% for po2 of 295.     1300 - pt woke, lifted upper body off bed, squeezed bilat hands and opened eyes, responded to all commands. Morphine given for pain and ETT advanced from 22 cm to 24 cm. Stat labs within NL, no electrolyte replacement needed. 1400 - RASS -1 to -2; pt eyes open taking Vt of 1-2 liters, rate 7-10 on CPAP. Placed back on IMV for periods of apnea. 1500 - RASS -1, eyes open most of time and responding to all commands appropriately. Placed on CPAP.     1525 - still having 4-5 second periods of apnea every couple minutes then arouses independently. ABG drawn:  [facility]N [id number][facility] as of 7/8/2019 15:45   Ref.  Range 7/8/2019 15:26   pH (POC) Latest Ref Range: 7.35 - 7.45   7.407   pCO2 (POC) Latest Ref Range: 35.0 - 45.0 MMHG 32.4 (L)   pO2 (POC) Latest Ref Range: 80 - 100 MMHG 357 (H)   HCO3 (POC) Latest Ref Range: 22 - 26 MMOL/L 20.4 (L)   sO2 (POC) Latest Ref Range: 92 - 97 % 100 (H)   Base deficit (POC) Latest Units: mmol/L 4   FIO2 (POC) Latest Units: % 50   Specimen type (POC) Latest Units:   ARTERIAL     1530 - pt extubated to NC O2 now; weak non-productive cough, low but audible voice tone. Pt verbalized being hot, blankets removed. 1700 - 4 hour labs WNL, no electrolyte replacement needed. RASS -2, arouses easily then falls back to sleep. 1800 - T&P, wiped down with CHG wipes and electrodes placed on anterior chest wall. Pt c/o minimal chest discomfort. 1900 - precedex restarted for increasing chest pain. STAND completed and passed, tolerating ice chips w/out N/V or coughing. 2000 - Bedside and Verbal shift change report given to Talya Lange RN (oncoming nurse) by me (offgoing nurse). Report included the following information SBAR, Kardex, OR Summary, Intake/Output, MAR, Recent Results and Cardiac Rhythm paced.

## 2019-07-08 NOTE — PERIOP NOTES
TRANSFER - IN REPORT:    Verbal report received from Terry Olsen on Colin Camarena  being received from [phone] routine progression of care      Report consisted of patients Situation, Background, Assessment and   Recommendations(SBAR). Information from the following report(s) SBAR, Procedure Summary, MAR and Recent Results was reviewed with the receiving nurse. Opportunity for questions and clarification was provided. Assessment completed upon patients arrival to unit and care assumed.

## 2019-07-08 NOTE — DIABETES MGMT
[facility]     D[facility] Cardiac Surgery Progress Note    Recommendations/ Comments:  Pt arrived to CVICU at 1147 on 7/8/19. Consider continuing insulin gtt for at least 48hrs post-op and eating 50% solid foods then,  1) transition off gtt per [employer] Protocol   2) continue accu-checks and humalog correctional insulin ac & hs until pt has 3 consecutive BG <150mg/dl off gtt then discontinue  Insulin gtt should not be stopped until after 1147 on 7/10/19 to complete 48hr post-op time frame. Currently on insulin gtt. Drip rate currently running at 2.6 units/hr. Last obtained BG was 145 mg/dl at 1220. Chart reviewed on [employer]. Patient is 58 y.o. male s/p Cardiac surgery POD 0. Pt with no observed hx of DM. Most recent A1c indicative of Pre-DM. A1c:   Lab Results   Component Value Date/Time    Hemoglobin A1c 5.9 07/05/2019 04:45 PM         Recent Glucose Results:   Lab Results   Component Value Date/Time    GLU 84 07/08/2019 04:39 AM    GLUCPOC 145 (H) 07/08/2019 12:20 PM        Lab Results   Component Value Date/Time    Creatinine 1.24 07/08/2019 04:39 AM     Estimated Creatinine Clearance: 66.1 mL/min (based on SCr of 1.24 mg/dL). Active Orders   Diet    DIET NPO        PO intake:   Patient Vitals for the past 72 hrs:   % Diet Eaten   07/07/19 1520 100 %   07/07/19 0854 100 %   07/06/19 1515 100 %   07/06/19 0900 100 %   07/05/19 1859 100 %       Will continue to follow as needed. Thank you.   Dylan Rubalcava RD, Diabetes Clinician      Time spent: 4 minutes

## 2019-07-08 NOTE — ANESTHESIA POSTPROCEDURE EVALUATION
Post-Anesthesia Evaluation and Assessment    Patient: Aguilar Gomez MRN: [id number]  SSN: [id number]    YOB: 1955  Age: 58 y.o. Sex: male      I have evaluated the patient and they are stable and ready for discharge from the PACU. Cardiovascular Function/Vital Signs  Visit Vitals  /60 (BP 1 Location: Left arm, BP Patient Position: At rest)   Pulse 87   Temp 36.6 °C (97.9 °F)   Resp 8   Wt 82.6 kg (182 lb 1.6 oz)   SpO2 100%   BMI 24.70 kg/m²       Patient is status post General anesthesia for Procedure(s):  CABG X 3 WITH CPB; DONOR SITES: LIMA AND LEFT SAPHENOUS VEIN VIA EVH; BERTRAND AND EPIAORTIC BY DR. WOLF. Nausea/Vomiting: None    Postoperative hydration reviewed and adequate. Pain:  Pain Scale 1: Behavioral Pain Scale (BPS) (07/08/19 1200)  Pain Intensity 1: 3 (07/08/19 1200)   Managed    Neurological Status:       sedated    Mental Status, Level of Consciousness: sedated    Pulmonary Status:   O2 Device: Endotracheal tube;Ventilator (07/08/19 1200)   Adequate oxygenation and airway patent    Complications related to anesthesia: None    Post-anesthesia assessment completed. No concerns    Signed By: Iman Keen MD     July 8, 2019              Procedure(s):  CABG X 3 WITH CPB; DONOR SITES: LIMA AND LEFT SAPHENOUS VEIN VIA EVH; BERTRAND AND EPIAORTIC BY DR. WOLF. general    <BSHSIANPOST>    Vitals Value Taken Time   BP     Temp     Pulse 86 7/8/2019  1:56 PM   Resp 10 7/8/2019  1:56 PM   SpO2 100 % 7/8/2019  1:56 PM   Vitals shown include unvalidated device data.

## 2019-07-09 ENCOUNTER — APPOINTMENT (OUTPATIENT)
Dept: GENERAL RADIOLOGY | Age: 64
DRG: 236 | End: 2019-07-09
Attending: NURSE PRACTITIONER
Payer: COMMERCIAL

## 2019-07-09 ENCOUNTER — HOME HEALTH ADMISSION (OUTPATIENT)
Dept: HOME HEALTH SERVICES | Facility: HOME HEALTH | Age: 64
End: 2019-07-09
Payer: COMMERCIAL

## 2019-07-09 LAB
ABO + RH BLD: NORMAL
ADMINISTERED INITIALS, ADMINIT: NORMAL
ALBUMIN SERPL-MCNC: 3.6 G/DL (ref 3.5–5)
ALBUMIN/GLOB SERPL: 1.7 {RATIO} (ref 1.1–2.2)
ALP SERPL-CCNC: 51 U/L (ref 45–117)
ALT SERPL-CCNC: 21 U/L (ref 12–78)
ANION GAP SERPL CALC-SCNC: 10 MMOL/L (ref 5–15)
ARTERIAL PATENCY WRIST A: NORMAL
AST SERPL-CCNC: 22 U/L (ref 15–37)
ATRIAL RATE: 50 BPM
BASE DEFICIT BLDV-SCNC: 3 MMOL/L
BDY SITE: NORMAL
BILIRUB SERPL-MCNC: 0.5 MG/DL (ref 0.2–1)
BLD PROD TYP BPU: NORMAL
BLD PROD TYP BPU: NORMAL
BLOOD GROUP ANTIBODIES SERPL: NORMAL
BPU ID: NORMAL
BPU ID: NORMAL
BUN SERPL-MCNC: 18 MG/DL (ref 6–20)
BUN/CREAT SERPL: 16 (ref 12–20)
CALCIUM SERPL-MCNC: 8.3 MG/DL (ref 8.5–10.1)
CALCULATED P AXIS, ECG09: 19 DEGREES
CALCULATED R AXIS, ECG10: 21 DEGREES
CALCULATED T AXIS, ECG11: 39 DEGREES
CHLORIDE SERPL-SCNC: 109 MMOL/L (ref 97–108)
CO2 SERPL-SCNC: 23 MMOL/L (ref 21–32)
CREAT SERPL-MCNC: 1.12 MG/DL (ref 0.7–1.3)
CROSSMATCH RESULT,%XM: NORMAL
CROSSMATCH RESULT,%XM: NORMAL
D50 ADMINISTERED, D50ADM: 0 ML
D50 ORDER, D50ORD: 0 ML
DIAGNOSIS, 93000: NORMAL
ERYTHROCYTE [DISTWIDTH] IN BLOOD BY AUTOMATED COUNT: 15 % (ref 11.5–14.5)
GAS FLOW.O2 O2 DELIVERY SYS: NORMAL L/MIN
GAS FLOW.O2 SETTING OXYMISER: 2 L/M
GLOBULIN SER CALC-MCNC: 2.1 G/DL (ref 2–4)
GLSCOM COMMENTS: NORMAL
GLUCOSE BLD STRIP.AUTO-MCNC: 100 MG/DL (ref 65–100)
GLUCOSE BLD STRIP.AUTO-MCNC: 102 MG/DL (ref 65–100)
GLUCOSE BLD STRIP.AUTO-MCNC: 102 MG/DL (ref 65–100)
GLUCOSE BLD STRIP.AUTO-MCNC: 103 MG/DL (ref 65–100)
GLUCOSE BLD STRIP.AUTO-MCNC: 104 MG/DL (ref 65–100)
GLUCOSE BLD STRIP.AUTO-MCNC: 105 MG/DL (ref 65–100)
GLUCOSE BLD STRIP.AUTO-MCNC: 109 MG/DL (ref 65–100)
GLUCOSE BLD STRIP.AUTO-MCNC: 109 MG/DL (ref 65–100)
GLUCOSE BLD STRIP.AUTO-MCNC: 111 MG/DL (ref 65–100)
GLUCOSE BLD STRIP.AUTO-MCNC: 113 MG/DL (ref 65–100)
GLUCOSE BLD STRIP.AUTO-MCNC: 113 MG/DL (ref 65–100)
GLUCOSE BLD STRIP.AUTO-MCNC: 114 MG/DL (ref 65–100)
GLUCOSE BLD STRIP.AUTO-MCNC: 120 MG/DL (ref 65–100)
GLUCOSE BLD STRIP.AUTO-MCNC: 124 MG/DL (ref 65–100)
GLUCOSE BLD STRIP.AUTO-MCNC: 140 MG/DL (ref 65–100)
GLUCOSE BLD STRIP.AUTO-MCNC: 145 MG/DL (ref 65–100)
GLUCOSE BLD STRIP.AUTO-MCNC: 97 MG/DL (ref 65–100)
GLUCOSE SERPL-MCNC: 104 MG/DL (ref 65–100)
GLUCOSE, GLC: 100 MG/DL
GLUCOSE, GLC: 102 MG/DL
GLUCOSE, GLC: 102 MG/DL
GLUCOSE, GLC: 103 MG/DL
GLUCOSE, GLC: 104 MG/DL
GLUCOSE, GLC: 105 MG/DL
GLUCOSE, GLC: 109 MG/DL
GLUCOSE, GLC: 109 MG/DL
GLUCOSE, GLC: 111 MG/DL
GLUCOSE, GLC: 113 MG/DL
GLUCOSE, GLC: 113 MG/DL
GLUCOSE, GLC: 114 MG/DL
GLUCOSE, GLC: 120 MG/DL
GLUCOSE, GLC: 124 MG/DL
GLUCOSE, GLC: 140 MG/DL
GLUCOSE, GLC: 145 MG/DL
GLUCOSE, GLC: 97 MG/DL
HCO3 BLDV-SCNC: 23.1 MMOL/L (ref 23–28)
HCT VFR BLD AUTO: 31.6 % (ref 36.6–50.3)
HGB BLD-MCNC: 9.8 G/DL (ref 12.1–17)
HIGH TARGET, HITG: 130 MG/DL
INSULIN ADMINSTERED, INSADM: 1.2 UNITS/HOUR
INSULIN ADMINSTERED, INSADM: 1.3 UNITS/HOUR
INSULIN ADMINSTERED, INSADM: 1.4 UNITS/HOUR
INSULIN ADMINSTERED, INSADM: 1.5 UNITS/HOUR
INSULIN ADMINSTERED, INSADM: 1.6 UNITS/HOUR
INSULIN ADMINSTERED, INSADM: 1.7 UNITS/HOUR
INSULIN ADMINSTERED, INSADM: 1.9 UNITS/HOUR
INSULIN ADMINSTERED, INSADM: 2 UNITS/HOUR
INSULIN ADMINSTERED, INSADM: 2.1 UNITS/HOUR
INSULIN ADMINSTERED, INSADM: 2.4 UNITS/HOUR
INSULIN ADMINSTERED, INSADM: 2.6 UNITS/HOUR
INSULIN ADMINSTERED, INSADM: 3.2 UNITS/HOUR
INSULIN ORDER, INSORD: 1.2 UNITS/HOUR
INSULIN ORDER, INSORD: 1.3 UNITS/HOUR
INSULIN ORDER, INSORD: 1.4 UNITS/HOUR
INSULIN ORDER, INSORD: 1.5 UNITS/HOUR
INSULIN ORDER, INSORD: 1.6 UNITS/HOUR
INSULIN ORDER, INSORD: 1.7 UNITS/HOUR
INSULIN ORDER, INSORD: 1.9 UNITS/HOUR
INSULIN ORDER, INSORD: 2 UNITS/HOUR
INSULIN ORDER, INSORD: 2.1 UNITS/HOUR
INSULIN ORDER, INSORD: 2.4 UNITS/HOUR
INSULIN ORDER, INSORD: 2.6 UNITS/HOUR
INSULIN ORDER, INSORD: 3.2 UNITS/HOUR
LOW TARGET, LOT: 95 MG/DL
MAGNESIUM SERPL-MCNC: 2.1 MG/DL (ref 1.6–2.4)
MCH RBC QN AUTO: 25 PG (ref 26–34)
MCHC RBC AUTO-ENTMCNC: 31 G/DL (ref 30–36.5)
MCV RBC AUTO: 80.6 FL (ref 80–99)
MINUTES UNTIL NEXT BG, NBG: 120 MIN
MINUTES UNTIL NEXT BG, NBG: 60 MIN
MULTIPLIER, MUL: 0.03
MULTIPLIER, MUL: 0.04
NRBC # BLD: 0 K/UL (ref 0–0.01)
NRBC BLD-RTO: 0 PER 100 WBC
ORDER INITIALS, ORDINIT: NORMAL
P-R INTERVAL, ECG05: 168 MS
PCO2 BLDV: 44.6 MMHG (ref 41–51)
PH BLDV: 7.32 [PH] (ref 7.32–7.42)
PLATELET # BLD AUTO: 143 K/UL (ref 150–400)
PMV BLD AUTO: 10.9 FL (ref 8.9–12.9)
PO2 BLDV: 37 MMHG (ref 25–40)
POTASSIUM SERPL-SCNC: 4 MMOL/L (ref 3.5–5.1)
PROT SERPL-MCNC: 5.7 G/DL (ref 6.4–8.2)
Q-T INTERVAL, ECG07: 506 MS
QRS DURATION, ECG06: 144 MS
QTC CALCULATION (BEZET), ECG08: 461 MS
RBC # BLD AUTO: 3.92 M/UL (ref 4.1–5.7)
SAO2 % BLDV: 65 % (ref 65–88)
SERVICE CMNT-IMP: ABNORMAL
SERVICE CMNT-IMP: NORMAL
SODIUM SERPL-SCNC: 142 MMOL/L (ref 136–145)
SPECIMEN EXP DATE BLD: NORMAL
SPECIMEN TYPE: NORMAL
STATUS OF UNIT,%ST: NORMAL
STATUS OF UNIT,%ST: NORMAL
TOTAL RESP. RATE, ITRR: 20
UNIT DIVISION, %UDIV: 0
UNIT DIVISION, %UDIV: 0
VENTRICULAR RATE, ECG03: 50 BPM
WBC # BLD AUTO: 11.4 K/UL (ref 4.1–11.1)

## 2019-07-09 PROCEDURE — 97110 THERAPEUTIC EXERCISES: CPT

## 2019-07-09 PROCEDURE — 36415 COLL VENOUS BLD VENIPUNCTURE: CPT

## 2019-07-09 PROCEDURE — 77010033678 HC OXYGEN DAILY

## 2019-07-09 PROCEDURE — 74011250637 HC RX REV CODE- 250/637: Performed by: NURSE PRACTITIONER

## 2019-07-09 PROCEDURE — 85027 COMPLETE CBC AUTOMATED: CPT

## 2019-07-09 PROCEDURE — 82803 BLOOD GASES ANY COMBINATION: CPT

## 2019-07-09 PROCEDURE — 51798 US URINE CAPACITY MEASURE: CPT

## 2019-07-09 PROCEDURE — 82962 GLUCOSE BLOOD TEST: CPT

## 2019-07-09 PROCEDURE — P9045 ALBUMIN (HUMAN), 5%, 250 ML: HCPCS | Performed by: NURSE PRACTITIONER

## 2019-07-09 PROCEDURE — 74011250636 HC RX REV CODE- 250/636: Performed by: THORACIC SURGERY (CARDIOTHORACIC VASCULAR SURGERY)

## 2019-07-09 PROCEDURE — 80053 COMPREHEN METABOLIC PANEL: CPT

## 2019-07-09 PROCEDURE — 74011000258 HC RX REV CODE- 258: Performed by: PHYSICIAN ASSISTANT

## 2019-07-09 PROCEDURE — P9045 ALBUMIN (HUMAN), 5%, 250 ML: HCPCS | Performed by: THORACIC SURGERY (CARDIOTHORACIC VASCULAR SURGERY)

## 2019-07-09 PROCEDURE — 65660000001 HC RM ICU INTERMED STEPDOWN

## 2019-07-09 PROCEDURE — 74011000250 HC RX REV CODE- 250: Performed by: PHYSICIAN ASSISTANT

## 2019-07-09 PROCEDURE — 71045 X-RAY EXAM CHEST 1 VIEW: CPT

## 2019-07-09 PROCEDURE — 74011250636 HC RX REV CODE- 250/636: Performed by: NURSE PRACTITIONER

## 2019-07-09 PROCEDURE — 74011250637 HC RX REV CODE- 250/637: Performed by: PHYSICIAN ASSISTANT

## 2019-07-09 PROCEDURE — 97530 THERAPEUTIC ACTIVITIES: CPT

## 2019-07-09 PROCEDURE — 74011250636 HC RX REV CODE- 250/636: Performed by: PHYSICIAN ASSISTANT

## 2019-07-09 PROCEDURE — 97161 PT EVAL LOW COMPLEX 20 MIN: CPT

## 2019-07-09 PROCEDURE — 93005 ELECTROCARDIOGRAM TRACING: CPT

## 2019-07-09 PROCEDURE — 97165 OT EVAL LOW COMPLEX 30 MIN: CPT

## 2019-07-09 PROCEDURE — 83735 ASSAY OF MAGNESIUM: CPT

## 2019-07-09 RX ORDER — LANOLIN ALCOHOL/MO/W.PET/CERES
1 CREAM (GRAM) TOPICAL
Status: DISCONTINUED | OUTPATIENT
Start: 2019-07-09 | End: 2019-07-12 | Stop reason: HOSPADM

## 2019-07-09 RX ORDER — ASCORBIC ACID 500 MG
1000 TABLET ORAL
Status: DISCONTINUED | OUTPATIENT
Start: 2019-07-09 | End: 2019-07-12 | Stop reason: HOSPADM

## 2019-07-09 RX ORDER — MESALAMINE 800 MG/1
800 TABLET, DELAYED RELEASE ORAL DAILY
Status: DISCONTINUED | OUTPATIENT
Start: 2019-07-09 | End: 2019-07-12 | Stop reason: HOSPADM

## 2019-07-09 RX ORDER — SODIUM CHLORIDE 0.9 % (FLUSH) 0.9 %
5-40 SYRINGE (ML) INJECTION EVERY 8 HOURS
Status: DISCONTINUED | OUTPATIENT
Start: 2019-07-09 | End: 2019-07-12 | Stop reason: HOSPADM

## 2019-07-09 RX ORDER — SODIUM CHLORIDE 0.9 % (FLUSH) 0.9 %
5-40 SYRINGE (ML) INJECTION AS NEEDED
Status: DISCONTINUED | OUTPATIENT
Start: 2019-07-09 | End: 2019-07-12 | Stop reason: HOSPADM

## 2019-07-09 RX ORDER — ALBUMIN HUMAN 50 G/1000ML
12.5 SOLUTION INTRAVENOUS
Status: COMPLETED | OUTPATIENT
Start: 2019-07-09 | End: 2019-07-09

## 2019-07-09 RX ORDER — ALBUMIN HUMAN 50 G/1000ML
SOLUTION INTRAVENOUS
Status: DISCONTINUED
Start: 2019-07-09 | End: 2019-07-09

## 2019-07-09 RX ORDER — ATORVASTATIN CALCIUM 40 MG/1
80 TABLET, FILM COATED ORAL DAILY
Status: DISCONTINUED | OUTPATIENT
Start: 2019-07-09 | End: 2019-07-12 | Stop reason: HOSPADM

## 2019-07-09 RX ADMIN — SODIUM CHLORIDE 9 ML/HR: 900 INJECTION, SOLUTION INTRAVENOUS at 07:45

## 2019-07-09 RX ADMIN — ALBUMIN (HUMAN) 12.5 G: 12.5 INJECTION, SOLUTION INTRAVENOUS at 02:05

## 2019-07-09 RX ADMIN — ALBUMIN (HUMAN) 12.5 G: 12.5 INJECTION, SOLUTION INTRAVENOUS at 09:02

## 2019-07-09 RX ADMIN — OXYCODONE HYDROCHLORIDE AND ACETAMINOPHEN 1 TABLET: 5; 325 TABLET ORAL at 22:02

## 2019-07-09 RX ADMIN — MESALAMINE 800 MG: 800 TABLET, DELAYED RELEASE ORAL at 09:02

## 2019-07-09 RX ADMIN — Medication 2 G: at 21:23

## 2019-07-09 RX ADMIN — ACETAMINOPHEN 650 MG: 325 TABLET ORAL at 21:17

## 2019-07-09 RX ADMIN — Medication 10 ML: at 21:17

## 2019-07-09 RX ADMIN — OXYCODONE HYDROCHLORIDE AND ACETAMINOPHEN 1 TABLET: 5; 325 TABLET ORAL at 17:15

## 2019-07-09 RX ADMIN — ACETAMINOPHEN 650 MG: 325 TABLET ORAL at 16:03

## 2019-07-09 RX ADMIN — ALBUMIN (HUMAN) 12.5 G: 12.5 INJECTION, SOLUTION INTRAVENOUS at 08:38

## 2019-07-09 RX ADMIN — OXYCODONE HYDROCHLORIDE AND ACETAMINOPHEN 2 TABLET: 5; 325 TABLET ORAL at 05:08

## 2019-07-09 RX ADMIN — SENNOSIDES, DOCUSATE SODIUM 1 TABLET: 50; 8.6 TABLET, FILM COATED ORAL at 08:59

## 2019-07-09 RX ADMIN — Medication 10 ML: at 05:15

## 2019-07-09 RX ADMIN — FAMOTIDINE 20 MG: 20 TABLET ORAL at 08:58

## 2019-07-09 RX ADMIN — FAMOTIDINE 20 MG: 20 TABLET ORAL at 21:17

## 2019-07-09 RX ADMIN — DIPHENHYDRAMINE HYDROCHLORIDE 25 MG: 25 CAPSULE ORAL at 23:09

## 2019-07-09 RX ADMIN — OXYCODONE HYDROCHLORIDE AND ACETAMINOPHEN 1000 MG: 500 TABLET ORAL at 08:59

## 2019-07-09 RX ADMIN — ACETAMINOPHEN 650 MG: 325 TABLET ORAL at 13:07

## 2019-07-09 RX ADMIN — CHLORHEXIDINE GLUCONATE 10 ML: 1.2 RINSE ORAL at 09:01

## 2019-07-09 RX ADMIN — ASPIRIN 81 MG 81 MG: 81 TABLET ORAL at 08:59

## 2019-07-09 RX ADMIN — CHLORHEXIDINE GLUCONATE 10 ML: 1.2 RINSE ORAL at 21:17

## 2019-07-09 RX ADMIN — POLYETHYLENE GLYCOL 3350 17 G: 17 POWDER, FOR SOLUTION ORAL at 08:58

## 2019-07-09 RX ADMIN — MUPIROCIN: 20 OINTMENT TOPICAL at 21:17

## 2019-07-09 RX ADMIN — CALCIUM CHLORIDE 1 G: 100 INJECTION INTRAVENOUS; INTRAVENTRICULAR at 09:00

## 2019-07-09 RX ADMIN — Medication 2 G: at 16:03

## 2019-07-09 RX ADMIN — Medication 2 G: at 09:00

## 2019-07-09 RX ADMIN — SENNOSIDES, DOCUSATE SODIUM 1 TABLET: 50; 8.6 TABLET, FILM COATED ORAL at 17:15

## 2019-07-09 RX ADMIN — AMIODARONE HYDROCHLORIDE 1 MG/MIN: 50 INJECTION, SOLUTION INTRAVENOUS at 05:08

## 2019-07-09 RX ADMIN — MUPIROCIN: 20 OINTMENT TOPICAL at 09:01

## 2019-07-09 RX ADMIN — Medication 2 G: at 03:14

## 2019-07-09 RX ADMIN — Medication 10 ML: at 16:03

## 2019-07-09 RX ADMIN — MAGNESIUM OXIDE TAB 400 MG (241.3 MG ELEMENTAL MG) 400 MG: 400 (241.3 MG) TAB at 17:15

## 2019-07-09 RX ADMIN — OXYCODONE HYDROCHLORIDE AND ACETAMINOPHEN 2 TABLET: 5; 325 TABLET ORAL at 08:58

## 2019-07-09 RX ADMIN — ATORVASTATIN CALCIUM 80 MG: 40 TABLET, FILM COATED ORAL at 09:00

## 2019-07-09 RX ADMIN — ALBUMIN (HUMAN) 12.5 G: 12.5 INJECTION, SOLUTION INTRAVENOUS at 00:10

## 2019-07-09 RX ADMIN — FERROUS SULFATE TAB 325 MG (65 MG ELEMENTAL FE) 325 MG: 325 (65 FE) TAB at 08:59

## 2019-07-09 RX ADMIN — AMIODARONE HYDROCHLORIDE 400 MG: 200 TABLET ORAL at 17:14

## 2019-07-09 NOTE — PROGRESS NOTES
CM reviewed CM notes from CM at [address] [place] [address]- patient transferred for CABG, s/p post-op day 1 of CABG. The patient lives at home alone in private residence- has two dogs at home, patient endorses ex-wife Marlen and neighbors are watching dogs while he is in the hospital. The patient is independent with ADLs and mobility- working full-time for the [employer]. The patient verified demographics and insurance information. The patient denied use of DME in the home, and denied difficulty affording or accessing medications. The patient states his ex-wife Thaddeus Hassan is emergency contact, states Marlen will transport home upon discharge depending on discharge date. The CM discussed home health- patient agreeable/Freedom of Choice discussed and referral sent to [facility], if they do not service the patient's area, patient agreeable for [facility] referral. CM will continue to follow for transitions of care, PT/OT to evaluate patient.  DEBRA Mahajan

## 2019-07-09 NOTE — PROGRESS NOTES
[facility]  ICU Progress Note    Admit Date: 2019  POD:  1 Day Post-Op    Procedure:  Procedure(s):  CABG X 3 WITH CPB; DONOR SITES: LIMA AND LEFT SAPHENOUS VEIN VIA EVH; BERTRAND AND EPIAORTIC BY DR. WOLF        Subjective:   Pt seen with Dr. Dinesh Thompson On Insulin/Amio only. Apaced with SB 50's under. OOB to chair with no complaints. Objective:   Vitals:  Blood pressure 112/82, pulse 88, temperature 97.9 °F (36.6 °C), resp. rate 18, weight 182 lb 1.6 oz (82.6 kg), SpO2 98 %. Temp (24hrs), Av.2 °F (36.2 °C), Min:96.8 °F (36 °C), Max:98 °F (36.7 °C)    Hemodynamics:   CO: CO (l/min): 5.1 l/min   CI: CI (l/min/m2): 2.8 l/min/m2   CVP: CVP (mmHg): 2 mmHg (19)   SVR: SVR (dyne*sec)/cm5: 988 (dyne*sec)/cm5 (19 0154)   PAP Systolic: PAP Systolic: 13 (96/10/23 5860)   PAP Diastolic: PAP Diastolic: 6 ( 9695)   PVR:     SV02: SVO2 (%): 59 % (19)   SCV02:      EKG/Rhythm:  Apaced - SB under    Extubation Date / Time:     CT Output: 730 (250 in 12 hrs)    Ventilator:  Ventilator Volumes  Vt Set (ml): 0 ml (19)  Vt Exhaled (Machine Breath) (ml): 916 ml (19)  Ve Observed (l/min): 15.3 l/min (19)    Oxygen Therapy:  Oxygen Therapy  O2 Sat (%): 98 % (19)  Pulse via Oximetry: 88 beats per minute (19)  O2 Device: Nasal cannula (19)  O2 Flow Rate (L/min): 2 l/min (19)  FIO2 (%): 50 % (19 1430)    CXR:  CXR Results  (Last 48 hours)               19 0456  XR CHEST PORT Final result    Impression:  IMPRESSION: No significant change. Narrative:  INDICATION:  postop heart       EXAM: CXR Portable. FINDINGS: Portable chest shows extubation and NG tube removal with otherwise   stable support lines since yesterday. There is no apparent pneumothorax. Lungs   show no acute findings. Heart size is stable. There is no overt pulmonary edema.            19 1212  XR CHEST PORT Final result    Impression:  IMPRESSION:    Multiple new tubes and lines, in satisfactory position. Mild linear atelectasis   in the left midlung. Narrative:  INDICATION:    postop heart        EXAMINATION:  AP CHEST, PORTABLE       COMPARISON: July 5       FINDINGS: Single AP portable view of the chest at 1156 hours demonstrates   interval ET tube placement, in satisfactory position. There is an NG tube   entering the stomach. There is a right IJ [place]-Anisha catheter, tip overlying the   main pulmonary outflow tract. There are also interval postsurgical changes of   median sternotomy and CABG. There is no evidence of pneumothorax. The   cardiomediastinal silhouette is stable. There is linear atelectasis in the left   midlung. Chest tube is noted. Admission Weight: Last Weight   Weight: 185 lb 6.5 oz (84.1 kg) Weight: 182 lb 1.6 oz (82.6 kg)     Intake / Output / Drain:  Current Shift: 07/09 0701 - 07/09 1900  In: -   Out: 50 [Drains:50]  Last 24 hrs.:     Intake/Output Summary (Last 24 hours) at 7/9/2019 0824  Last data filed at 7/9/2019 0800  Gross per 24 hour   Intake 4404.98 ml   Output 2681 ml   Net 1723.98 ml       EXAM:  General:   NAD                                                                                           Lungs:   Clear to auscultation bilaterally. Incision:  Dsg CDI   Heart:  Regular rate and rhythm, S1, S2 normal, no murmur, click, rub or gallop. Abdomen:   Soft, non-tender. Bowel sounds normal. No masses,  No organomegaly. Extremities:  No edema. PPP. Neurologic:  Gross motor and sensory apparatus intact.      Labs:   Recent Labs     07/09/19  0739  07/09/19  0330  07/08/19  1224   WBC  --   --  11.4*  --  18.7*   HGB  --   --  9.8*   < > 11.2*   HCT  --   --  31.6*   < > 36.1*   PLT  --   --  143*  --  181   NA  --   --  142   < > 141   K  --   --  4.0   < > 4.5   BUN  --   --  18   < > 15   CREA  --   --  1.12   < > 1.30   GLU  --   --  104*   < > 134* GLUCPOC 113*   < >  --    < >  --    INR  --   --   --   --  1.4*    < > = values in this interval not displayed. Assessment:     Active Problems:    CAD (coronary artery disease) (2019)      S/P CABG x 3 (2019)         Plan/Recommendations/Medical Decision Makin. CAD/NSTEMI s/p CABG: cont ASA/Statin, hold BB due to hypotension/bradycardia  2.  HTN: hold BB for now. 3. GERD: pepcid for SUP, tums prn  4. Chronic ulcerative colitis: controlled w/ diet.  cont mesalamine. 5. Bradycardia: stop IV amio. Cont PO per Fiser. Hold BB for now. 6. Anticipated acute blood loss anemia: start Fe/Vit C. Monitor HH, CT output  7. Thrombocytopenia: mild. Cont ASA. 8. Leukocytosis: mild. Likely related to post op state. Mobilize. 9. Acute Postop Pulmonary Insufficiency: wean NC as tolerated. IS, coughing, deep breathing. Dispo: deline. Transfer to stepdown. Stop IV Amio.       Signed By: GHULAM Gardiner

## 2019-07-09 NOTE — PROGRESS NOTES
CARE PLAN:    Problem: CABG: Day of Surgery (Initiate SCIP measures for post-op care)  Goal: *Hemodynamically stable (eg: Cardiac output/index; pulmonary arterial pressures; mixed venous oxygen saturation within set parameters)  Outcome: Progressing Towards Goal  Note:   CI >2, PA pressures low, CVP low, MAP soft, on catia drip, and receiving albumins. SvO2 70s. Goal: *Lungs clear or at baseline  Outcome: Progressing Towards Goal  Note:   2L NC, sating %. Clear, diminished breath sounds. Goal: *Stable cardiac rhythm  Outcome: Progressing Towards Goal  Note:   Paced rhythm. Underlying rhythm sinus javier 50s. Goal: *Afebrile  Outcome: Progressing Towards Goal  Goal: *Follows simple commands post anesthesia  Outcome: Progressing Towards Goal  Note:   Follows commands w/o issue. Goal: *Orients easily following extubation  Outcome: Progressing Towards Goal  Note:   Drowsy. AOx4. Goal: *Optimal pain control at patient's stated goal  Outcome: Progressing Towards Goal  Note:   Pt on precedex drip and receiving PRN percocet for post-op chest pain/discomfort. Problem: Falls - Risk of  Goal: *Absence of Falls  Description  Document Madison Mcdonald Fall Risk and appropriate interventions in the flowsheet. Outcome: Progressing Towards Goal  Note:   Fall Risk Interventions:  Medication Interventions: Evaluate medications/consider consulting pharmacy, Patient to call before getting OOB  Elimination Interventions: Call light in reach, Patient to call for help with toileting needs, Toileting schedule/hourly rounds    Problem: Pressure Injury - Risk of  Goal: *Prevention of pressure injury  Description  Document Brady Scale and appropriate interventions in the flowsheet.   Outcome: Progressing Towards Goal  Note:   Pressure Injury Interventions:  Sensory Interventions: Assess changes in LOC, Check visual cues for pain, Float heels, Keep linens dry and wrinkle-free, Minimize linen layers, Pressure redistribution bed/mattress (bed type), Turn and reposition approx. every two hours (pillows and wedges if needed)  Activity Interventions: Increase time out of bed, Pressure redistribution bed/mattress(bed type), PT/OT evaluation  Mobility Interventions: Float heels, HOB 30 degrees or less, Pressure redistribution bed/mattress (bed type), PT/OT evaluation, Turn and reposition approx.  every two hours(pillow and wedges)  Nutrition Interventions: Offer support with meals,snacks and hydration, Document food/fluid/supplement intake  Friction and Shear Interventions: Lift sheet, Minimize layers    Problem: Non-Violent Restraints  Goal: *Removal from restraints as soon as assessed to be safe  Outcome: Resolved/Met  Goal: *No harm/injury to patient while restraints in use  Outcome: Resolved/Met  Goal: *Patient's dignity will be maintained  Outcome: Resolved/Met  Goal: *Patient Specific Goal (EDIT GOAL, INSERT TEXT)  Outcome: Resolved/Met  Goal: Non-violent Restaints:Standard Interventions  Outcome: Resolved/Met  Goal: Non-violent Restraints:Patient Interventions  Outcome: Resolved/Met  Goal: Patient/Family Education  Outcome: Resolved/Met     Problem: Ventilator Management  Goal: *Adequate oxygenation and ventilation  Outcome: Resolved/Met  Goal: *Patient maintains clear airway/free of aspiration  Outcome: Resolved/Met  Goal: *Absence of infection signs and symptoms  Outcome: Resolved/Met  Goal: *Normal spontaneous ventilation  Outcome: Resolved/Met

## 2019-07-09 NOTE — PROGRESS NOTES
Problem: Mobility Impaired (Adult and Pediatric)  Goal: *Acute Goals and Plan of Care (Insert Text)  Description  Physical Therapy Goals  Initiated 7/9/2019  1. Patient will move from supine to sit and sit to supine, scoot up and down and roll side to side in bed with modified independence within 5 days. 2.  Patient will perform sit to/from stand with independence within 5 days. 3.  Patient will ambulate 300 feet with independence within 5 days. 4.  Patient will ascend/descend 4 stairs with handrail(s), per home set-up, with modified independence within 5 days. 5.  Patient will perform cardiac exercises per protocol with independence within 5 days. 6.  Patient will verbally and functionally recall 3/3 sternal precautions within 5 days. Outcome: Progressing Towards Goal     PHYSICAL THERAPY EVALUATION  Patient: Marielle Uriarte (40 y.o. male)  Date: 7/9/2019  Primary Diagnosis: CAD (coronary artery disease) [I25.10]  Procedure(s) (LRB):  CABG X 3 WITH CPB; DONOR SITES: LIMA AND LEFT SAPHENOUS VEIN VIA EVH; BERTRAND AND EPIAORTIC BY DR. WOLF (N/A) 1 Day Post-Op   Precautions:  Fall, Sternal, [place]    ASSESSMENT :  Based on the objective data described below, the patient presents with impaired functional tolerance/mobility following CABG x 3, now POD #1. Currently, patient's functional mobility is limited by the following: newly enforced sternal precautions (recalled 3/3), post-op sternotomy pain, impaired cardiopulmonary tolerance (SvO2 from 58% to 33% with chair > bed transfer), and restriction of lines/leads in ICU setting. Overall, patient demonstrated ability to perform functional transfers with contact guard assistance (+ utilization of anterior rocking for momentum) and to side-step to the left with contact guard assistance. Anticipate steady functional gains once patient is transferred to step-down unit.  Therefore, recommend discharge home with HHPT vs transition to OP Cardiac Rehab.     Prior Level of Function: Lives alone, works 70+ hours per week, cardio exercises + weight-lifting 2-3 times per week. Patient will benefit from skilled intervention to address the above impairments. Patient?s rehabilitation potential is considered to be Good  Factors which may influence rehabilitation potential include:   ? None noted  ? Mental ability/status  ? Medical condition  ? Home/family situation and support systems  ? Safety awareness  ? Pain tolerance/management  ? Other:      PLAN :  Recommendations and Planned Interventions:  ?           Bed Mobility Training             ? Neuromuscular Re-Education  ? Transfer Training                   ? Orthotic/Prosthetic Training  ? Gait Training                         ? Modalities  ? Therapeutic Exercises           ? Edema Management/Control  ? Therapeutic Activities            ? Patient and Family Training/Education  ? Other (comment):    Frequency/Duration: Patient will be followed by physical therapy  daily to address goals. Discharge Recommendations: Home Health vs. OP Cardiac Rehab   Further Equipment Recommendations for Discharge: Most likely none     SUBJECTIVE:   Patient stated ? I'm just kind of bored. ?    OBJECTIVE DATA SUMMARY:   HISTORY:    Past Medical History:   Diagnosis Date    GERD (gastroesophageal reflux disease)     Ulcerative colitis, chronic ([facility] [address]    History reviewed. No pertinent surgical history.   Personal factors and/or comorbidities impacting plan of care:    Home Situation  Home Environment: Private residence  # Steps to Enter: 3  Rails to Enter: Yes  Wheelchair Ramp: No  One/Two Story Residence: One story  Living Alone: Yes  Support Systems: Family member(s)(Ex wife and friend in [facility])  Patient Expects to be Discharged to[de-identified] Private residence  Current DME Used/Available at Home: None  Tub or Shower Type: Tub/Shower combination    EXAMINATION/PRESENTATION/DECISION MAKING:   Critical Behavior:  Neurologic State: Alert  Orientation Level: Oriented X4  Cognition: Follows commands  Safety/Judgement: Awareness of environment, Insight into deficits  Hearing: Auditory  Auditory Impairment: Hearing aid(s)  Hearing Aids/Status: At bedside  Skin:  Sternal dressing c/d/i  Edema: Trace noted in LEs  Range Of Motion:  AROM: Within functional limits           PROM: Within functional limits           Strength:    Strength: Within functional limits                    Tone & Sensation:   Tone: Normal              Sensation: Impaired(L UE: 5 digits - \"tingling sensation\")               Coordination:  Coordination: Within functional limits  Vision:   Acuity: Within Defined Limits;Able to read clock/calendar on wall without difficulty; Able to read employee name badge without difficulty  Corrective Lenses: Glasses  Functional Mobility:  Bed Mobility:  Sit to Supine: Bed Modified     Transfers:  Sit to Stand: Contact guard assistance(2nd person for line management)  Stand to Sit: Contact guard assistance(2nd person for line management)  Bed to Chair: Contact guard assistance(2nd person for line management)              Balance:   Sitting: Intact; Without support  Standing: Impaired; Without support  Standing - Static: Good  Standing - Dynamic : Fair    Functional Measure:  Timed up and go:    Timed Get Up And Go Test: (Unable to complete this date)       < than 10 seconds=Normal  Greater then 13.5 seconds (in elderly)=Increased fall risk   Junito Cardona [place] M. Predicting the probability for falls in community dwelling older adults using the Timed Up and Go Test. Phys Ther. 2000;80:896-903.       Based on the above components, the patient evaluation is determined to be of the following complexity level: LOW     Pain:  Pain Scale 1: Numeric (0 - 10)  Pain Intensity 1: 2  Pain Location 1: Incisional  Pain Orientation 1: Anterior  Pain Description 1: Aching  Pain Intervention(s) 1: Medication (see MAR)    Activity Tolerance:  Please refer to the flowsheet for vital signs taken during this treatment. After treatment:   ?         Patient left in no apparent distress sitting up in chair  ? Patient left in no apparent distress in bed  ? Call bell left within reach  ? Nursing notified  ? Caregiver present  ? Bed alarm activated    COMMUNICATION/EDUCATION:   The patient?s plan of care was discussed with: Occupational Therapist, Registered Nurse and Rehabilitation Attendant. ?         Fall prevention education was provided and the patient/caregiver indicated understanding. ? Patient/family have participated as able in goal setting and plan of care. ?         Patient/family agree to work toward stated goals and plan of care. ?         Patient understands intent and goals of therapy, but is neutral about his/her participation. ? Patient is unable to participate in goal setting and plan of care.     Thank you for this referral.  Edgardo Brewer, PT, DPT   Time Calculation: 16 mins

## 2019-07-09 NOTE — OP NOTES
[address]   OPERATIVE REPORT    Name:  Marylen Bonnet  MR#:  [id number]  :  1955  ACCOUNT #:  [de-identified]  DATE OF SERVICE:  2019      PREOPERATIVE DIAGNOSIS:  Multivessel coronary artery disease. POSTOPERATIVE DIAGNOSIS:  Multivessel coronary artery disease. PROCEDURES PERFORMED:  1. CABG x3 (LIMA to LAD; saphenous vein graft to OM1; saphenous vein graft to right coronary artery). 2.  Endoscopic vein harvest, right lower extremity. SURGEON:  Juan Carlos Prabhakar MD    ASSISTANT:  Oneyda Ferrer     ANESTHESIA:  General endotracheal anesthesia. COMPLICATIONS:  None. SPECIMENS REMOVED:  None. IMPLANTS:  None. ESTIMATED BLOOD LOSS:  50 mL. PROCEDURE:  The patient is a very pleasant 27-year-old gentleman who has been diagnosed with multivessel coronary artery disease. He is now being brought to the operating room for coronary artery bypass grafting. The patient was brought to the operating room, had a right radial A-line placed without complications, Alden-Anisha catheter placed without complications, general endotracheal anesthesia without complications. Chest, abdomen, and lower extremities were prepped and draped in the usual sterile fashion. A midline incision was made on the patient's sternum. Cautery dissection was used to dissect down to the level of the sternal bone, and the sternal bone with a sagittal saw, and left pleural space was entered. During this portion of the procedure, endoscopic vein harvesting was performed on the right lower extremity without complication. Once the left pleural space was entered, the left internal mammary artery was carefully dissected off the chest wall, taking care to place the proximal clips on each portion of the internal mammary branch. Once this was completely dissected off the chest wall, an appropriate dose of heparin was given. Three minutes passed.   Two large distal clips were placed and the artery was cut off the chest wall. There was good flow at this stage. Next, the pericardium was opened and pursestring sutures were applied. Once the ACT was above 460, we performed epiaortic ultrasound and did not identify any significant atherosclerotic plaque in the ascending aorta. We then placed 2 pursestring sutures in the ascending aorta and cannulated with a normal aortic perfuser, deaired, and hooked up to bypass circuit. We then made a small V in the pericardium and matured the left internal mammary artery to a length of appropriate size up to the left anterior descending artery. We then placed a pursestring suture in the right atrium, placed a two-stage venous cannula. This was deaired and hooked up to the bypass circuit. We went on with cardiopulmonary bypass, placed an antegrade cardioplegia needle, deaired the cardioplegia line, and hooked up to the cannula, which was cross-clamped, pumped back up again. We gave 750 mL of initial cardioplegia, however, required subsequently 400 mL. We then tilted up the heart and dissected out the OM1 vessel with a 69 blade. We decided to further extend the arteriotomy with forward and reverse Aguero. We then performed the saphenous vein graft to OM1 anastomosis with 7-0 Prolene suture. We then measured the graft length to appropriate size and similarly cut it. I then dissected the right coronary artery with a 69 blade, opened with a 75 blade, and further extended the arteriotomy with forward and reverse Aguero. We then performed saphenous vein graft to right coronary anastomosis with 7-0 Prolene suture. We then measured the vein graft length to appropriate size and similarly cut it. We then gave a dose of cardioplegia. We then dissected out the left anterior descending artery with a 69 blade. We decided to further extend the arteriotomy with forward and reverse Aguero. We then performed the LIMA to LAD anastomosis with 8-0 Prolene suture.   We then made 2 small nicks in the ascending aorta, used 4-punch x2, and performed proximal anastomosis in the right and left side grafts. Care was taken to de-air the ascending aorta and for tying it down. We then brought the head of the bed up, pumped it down with the cross-clamp, pumped it back up again, the temperature reached 36.2, and the patient successfully came off the coronary artery bypass. We placed the A and V wires, AC locators, and Nicola drains. Wires were used to close the sternum and Vicryl suture was used to close the subcutaneous tissue. We measured flows in all the grafts and they were excellent.         Maurene Gowers, MD      [id number]  D:  07/08/2019 11:22  T:  07/08/2019 13:26  JOB #:  [id number]

## 2019-07-09 NOTE — DIABETES MGMT
[facility]     [facility] Cardiac Surgery Progress Note    Recommendations/ Comments:  Pt arrived to CVICU at 1147 on 7/8/19. Consider continuing insulin gtt for at least 48hrs post-op and eating 50% solid foods then,  1) transition off gtt per [facility] Protocol   2) continue accu-checks and humalog correctional insulin ac & hs until pt has 3 consecutive BG <150mg/dl off gtt then discontinue  Insulin gtt should not be stopped until after 1147 on 7/10/19 to complete 48hr post-op time frame. Currently on insulin gtt. At 1321  mg/dL, rate 1.3 units/hr; received 11 units in the past 6 hours    Chart reviewed on [employer]. Patient is 58 y.o. male s/p Cardiac surgery POD 1. Pt with no observed hx of DM. A1c:   Lab Results   Component Value Date/Time    Hemoglobin A1c 5.9 07/05/2019 04:45 PM         Recent Glucose Results:   Lab Results   Component Value Date/Time     (H) 07/09/2019 03:30 AM     (H) 07/08/2019 03:54 PM    GLUCPOC 104 (H) 07/09/2019 01:20 PM    GLUCPOC 102 (H) 07/09/2019 12:16 PM    GLUCPOC 114 (H) 07/09/2019 11:12 AM        Lab Results   Component Value Date/Time    Creatinine 1.12 07/09/2019 03:30 AM     Estimated Creatinine Clearance: 73.1 mL/min (based on SCr of 1.12 mg/dL). Active Orders   Diet    DIET CLEAR LIQUID No Conc. Sweets        PO intake:   Patient Vitals for the past 72 hrs:   % Diet Eaten   07/09/19 0800 75 %   07/07/19 1520 100 %   07/07/19 0854 100 %   07/06/19 1515 100 %       Will continue to follow as needed. Thank you.   Maile Georges RN, CDE    Time spent: 3 minutes

## 2019-07-09 NOTE — PROGRESS NOTES
1945: Report received from Laurence Plasencia RN. Catia, insulin, precedex, amio, MIV, and pressure bag drips verified. Goals: MAP >65, CI >2, and SBP <130. Pt in bed sleeping, on 4L NC sating 100%. Pt drowsy, but AOx4. Pt passed dysphagia screen w/ day RN. Pt did not receive any albumins post-op -- PA pressures teens/singles, CVP 2-5, SvO2 68-73, and CI 2.1-2.5. Plan to wean pressor and precedex overnight. 2003: Arterial line and cuff pressures correlate, off by few points. 2051: Pt c/o chest tightness/discomfort, PRN percocet 5mg given. Scheduled PO tylenol held. 2115: SBP >125, weaning catia drip. 2118: MAP 87, CI 2.1, PAD 10, CVP 5, & weaning catia drip - albumin given. 2122: Underlying rhythm sinus javier 50s. Decreased pacemaker rate to AAI 80 & 8mA. 2230: Pt sating 100%, decreased O2 to 2L NC.    0009: MAP 64-65, increased pacemaker rate to AAI 86 & 8mA. 0010: BP soft, attempting to wean catia drip, PAD 6-7, CVP 3-4, CI 2.5, SvO2 73, and good u/o - albumin given. 0119: Catia drip stopped, /67 (82).    0131: Restarted catia drip, MAP 64-65.    0205: Weaning catia drip, MAP 77, PAD 9, CVP 7, CI 2.8, and SvO2 71 - albumin given. 9274: Catia stopped, MAP 81    0330: Labs sent    0351: EKG done, sinus javier w/ RBBB.    0422: Mixed 65, updated in hemodynamics w/ recent Hgb.    0449: Catia restarted, MAP <65    0508: Pt c/o chest pain/tightness, PRN percocet 10mg given. 7223: Catia stopped. 1179: Byers d/c'd. Adequate U/O throughout shift.    0720: OOB to chair, x2 assist. Okay gait and balance. Pt is very [place], usually wears hearing aid in L ear and said \"R ear is so bad a hearing aid wouldn't help\". With that being said, pt did not hear directions well while ambulating to chair. ..it took significant prompting to get pt to  feet and to sit down. Speak clearly, slowly, and directly. At one point I had to clarify if the pt could hear me.  Once we sat down in the chair, pt looked confused and BP was low. Pt denied dizziness, but verbalized feeling \"out of it\", still AOx4. 0745: Bedside and Verbal shift change report given to VIRAJ FOY RN. Report included the following information SBAR, Intake/Output, MAR, Recent Results, Cardiac Rhythm A Paced and Alarm Parameters . 0800: Provider rounding, updated on BP and recent EKG results. Order received to stop amio drip, start PO amio, and give 1-2 albumins per Dr. Naa Urbina.

## 2019-07-09 NOTE — PROGRESS NOTES
0730: Bedside and Verbal shift change report given to JAZ CALI (oncoming nurse) by Vansesa Feliciano RN (offgoing nurse). Report included the following information SBAR, Kardex, Intake/Output, MAR, Recent Results, Med Rec Status and Cardiac Rhythm AAI Paced. 0757: Amio gtt stopped d/t underlying Sinus Bradycardia. Only PO Amio orders for now. 0825: David gtt started for MAP in the low 59K with systolic in the 00L. 2 doses of 12.5g 250cc Albumin given as well. 8101: David gtt stopped. 9087: OT at bedside. 1015: PT at bedside. 1100: R MAC/[place] and R arterial line removed. No bleeding or hematoma noted. Pt will remain in bed x 30 minutes. 1200: Bladder scanned patient and only 45 mLs of urine in bladder. Will recheck in 2 hours. 1230: TRANSFER - OUT REPORT:    Verbal report given to Carlos Enrique Chris RN (name) on [facility]  being transferred to CVSU (unit) for routine progression of care       Report consisted of patients Situation, Background, Assessment and   Recommendations(SBAR). Information from the following report(s) SBAR, Kardex, Intake/Output, MAR, Recent Results, Med Rec Status and Cardiac Rhythm NSR was reviewed with the receiving nurse.     Lines:   Peripheral IV 07/03/19 Right Antecubital (Active)   Site Assessment Clean, dry, & intact 7/9/2019 11:00 AM   Phlebitis Assessment 0 7/9/2019 11:00 AM   Infiltration Assessment 0 7/9/2019 11:00 AM   Dressing Status Clean, dry, & intact 7/9/2019 11:00 AM   Dressing Type Transparent;Tape 7/9/2019 11:00 AM   Hub Color/Line Status Pink;Capped;Flushed;Patent 7/9/2019 11:00 AM   Action Taken Open ports on tubing capped;Dressing changed 7/9/2019 11:00 AM   Alcohol Cap Used Yes 7/9/2019 11:00 AM       Peripheral IV 07/04/19 Left Forearm (Active)   Site Assessment Clean, dry, & intact 7/9/2019 11:00 AM   Phlebitis Assessment 0 7/9/2019 11:00 AM   Infiltration Assessment 0 7/9/2019 11:00 AM   Dressing Status Clean, dry, & intact 7/9/2019 11:00 AM   Dressing Type Transparent;Tape 7/9/2019 11:00 AM   Hub Color/Line Status Pink; Infusing;Flushed;Patent 7/9/2019 11:00 AM   Action Taken Open ports on tubing capped;Dressing changed 7/9/2019 11:00 AM   Alcohol Cap Used Yes 7/9/2019 11:00 AM        Opportunity for questions and clarification was provided.       Patient transported with:   Monitor  Registered Nurse

## 2019-07-09 NOTE — PROGRESS NOTES
1230: TRANSFER - IN REPORT:    Verbal report received from VIRAJ RN(name) on Brendon Lee  being received from CVICU(unit) for routine progression of care      Report consisted of patients Situation, Background, Assessment and   Recommendations(SBAR). Information from the following report(s) SBAR, Kardex, Procedure Summary, Intake/Output, MAR and Recent Results was reviewed with the receiving nurse. Opportunity for questions and clarification was provided. Assessment completed upon patients arrival to unit and care assumed. 1600: Pt showing no pacer spikes on monitor. HR steady in 70s. Pacer box turned off. Wires connected to pt.     1830: Pt bladder scanned to show 300 ml in bladder. Pt given opportunity to void. Voided 360 ml.     1930: Bedside shift change report given to Ilan (oncoming nurse) by Deven Benson RN (offgoing nurse). Report included the following information SBAR, Kardex, Procedure Summary, Intake/Output, MAR and Recent Results. Cardiac Surgery Shift Summary:    1. I/O's: Intake:   Meals: 50-75% of each meal (good)     Output: WDL (voiding without difficulty)   Has patient had BM since surgery? No     2. Oxygen Requirements: WDL (on room air)    3. Daily Weights (weight change in 24 hours): No significant change in weight (0 - 2 lbs.)    4.  Medication Administration: No variations to medication administration

## 2019-07-09 NOTE — PROGRESS NOTES
Problem: Self Care Deficits Care Plan (Adult)  Goal: *Acute Goals and Plan of Care (Insert Text)  Description  Occupational Therapy Goals  Initiated 7/9/2019  1. Patient will perform ADLs standing 5 mins without fatigue or LOB with supervision/set-up within 5 day(s). 2.  Patient will perform lower body ADLs with supervision/set-up within 5 day(s). 3.  Patient will perform gathering ADL items high and low 2/2 with supervision/set-up within 5 day(s). 4.  Patient will perform toilet transfers with supervision/set-up within 5 day(s). 5.  Patient will perform all aspects of toileting with supervision/set-up within 5 day(s). 6.  Patient will participate in cardiac/sternal upper extremity therapeutic exercise/activities to increase independence with ADLs with supervision/set-up for 5 minutes within 5 day(s). Outcome: Progressing Towards Goal     OCCUPATIONAL THERAPY EVALUATION  Patient: Iris Pa (89 y.o. male)  Date: 7/9/2019  Primary Diagnosis: CAD (coronary artery disease) [I25.10]  Procedure(s) (LRB):  CABG X 3 WITH CPB; DONOR SITES: LIMA AND LEFT SAPHENOUS VEIN VIA EVH; BERTRAND AND EPIAORTIC BY DR. WOLF (N/A) 1 Day Post-Op   Precautions: sternal         ASSESSMENT :  Based on the objective data described below, patient presents with Setup upper body ADLs and Contact guard assistance lower body ADLs (inferred). Patient received in chair, having mobilized with RN. Patient receptive to education and provided with handout on ADL modifications, sternal precautions, and UE cardiac exercises. Patient demonstrates tailor sit for LB access to avoid deep bending. Patient performed 6/6 UE cardiac exercises seated in chair. Prior level of function: Independent with ADLs/ IADLs ambulatory with no AD. Working, designing security systems for correctional facilities, and driving. Reports no falls. Living alone.        PLAN :  Recommendations and Planned Interventions: self care training, functional mobility training, therapeutic exercise, balance training, therapeutic activities, endurance activities, patient education, home safety training and family training/education    Frequency/Duration: Patient will be followed by occupational therapy 5 times a week to address goals. Patient will benefit from skilled acute intervention to address the above impairments. Patient?s rehabilitation potential is considered to be Good    Discharge recommendations: home health vs no follow-up OT needs pending progress     SUBJECTIVE:   Patient stated ? I have a little pain but it's not too bad.?    OBJECTIVE DATA SUMMARY:   HISTORY:   Past Medical History:   Diagnosis Date    GERD (gastroesophageal reflux disease)     Ulcerative colitis, chronic ([facility], [place] [address]    History reviewed. No pertinent surgical history. Expanded or extensive additional review of patient history:     Home Situation  Home Environment: Private residence  # Steps to Enter: 3  One/Two Story Residence: One story  Living Alone: Yes  Support Systems: Family member(s)(Ex wife and friend in [facility])  Patient Expects to be Discharged to[de-identified] Private residence  Current DME Used/Available at Home: None  Tub or Shower Type: Tub/Shower combination    EXAMINATION OF PERFORMANCE DEFICITS:  Cognitive/Behavioral Status:  Neurologic State: Alert  Orientation Level: Oriented X4  Cognition: Follows commands  Perception: Appears intact  Perseveration: No perseveration noted  Safety/Judgement: Awareness of environment; Insight into deficits    Skin: visible skin appears intact, dressing c/d/i    Edema: none noted    Hearing: Auditory  Auditory Impairment: Hearing aid(s)  Hearing Aids/Status: At bedside    Vision/Perceptual:                           Acuity: Within Defined Limits;Able to read clock/calendar on wall without difficulty; Able to read employee name badge without difficulty    Corrective Lenses: Glasses    Range of Motion:    AROM: Within functional limits  PROM: Within functional limits                      Strength:    Strength: Generally decreased, functional(BUE, sternal precautions)                Coordination:  Coordination: Within functional limits  Fine Motor Skills-Upper: Left Intact; Right Intact    Gross Motor Skills-Upper: Left Intact; Right Intact    Tone & Sensation:    Tone: Normal  Sensation: Intact                      Balance:  Sitting: Intact    ADL Assessment:  Feeding: Independent(inferred)    Oral Facial Hygiene/Grooming: Setup(inferred)    Bathing: Contact guard assistance(inferred)    Upper Body Dressing: Setup(inferred)    Lower Body Dressing: Contact guard assistance(inferred)    Toileting: Contact guard assistance(inferred)                ADL Intervention and task modifications:          Cognitive Retraining  Safety/Judgement: Awareness of environment; Insight into deficits    The patient instructed and demonstrated 3/3 sternal precautions. Patient instructed no asymmetrical reaching over head to ensure B UEs when shoulders >90* i.e. reaching in cabinets and dressing. Instruction on upper body dressing techniques of over head, then arms through to decrease pain and unilateral shoulder flexion >90*. Instruction on the benefits of utilizing B UEs during functional tasks i.e. opening the fridge, stepping into the tub. Instruction if continued pain at home with shoulder IR for BM hygiene can use wet wipes and toilet tongs PRN. Avoid valsalva maneuvers. May have to adjust home setup to increase ease with items closer to waist height to prevent deep bending and the automatic  of asymmetrical UE WB/pushing for stabilization during bending. Benefit to don clothing tailor sitting and don all clothing while sitting prior to standing. Increase activity tolerance for home, work, and sexual intercourse by pacing self with increasing the arm exercises, sitting duration, frequency OOB, walking, standing, and ADLs.  Instructed and indicated understanding of s/s of too much activity, how to respond to s/s safely. Therapeutic Exercises:   Patient instructed on the benefits and demonstrated cardiac exercises while seated with Setup. Instructed and indicated understanding on how to progress reps, sets against gravity, working up to 5 lbs, standing and so on based on surgeon clearance for more weight in prep for basic and instrumental ADLs. Instruction on the use of household items in place of weights as needed. CARDIAC   EXERCISE    Sets    Reps    Active  Active Assist    Passive  Self ROM    Comments    Shoulder flexion  1  5   ?                            ?                             ?                             ?                                Shoulder abduction  1  5  ?                             ?                             ?                             ?                                Scapular elevation  1  5  ?                             ?                              ?                             ?                                Scapular retraction  1  5  ?                             ?                             ?                             ?                                Trunk rotation  1  5  ?                             ?                             ?                             ?                                Trunk sidebending  1  5  ?                             ?                              ?                             ?                                        Functional Measure:  Barthel Index:    Bathin  Bladder: 10  Bowels: 10  Groomin  Dressin  Feeding: 10  Mobility: 0  Stairs: 0  Toilet Use: 5  Transfer (Bed to Chair and Back): 10  Total: 55/100        Percentage of impairment   0%   1-19%   20-39%   40-59%   60-79%   80-99%   100%   Barthel Score 0-100 100 99-80 79-60 59-40 20-39 1-19   0     The Barthel ADL Index: Guidelines  1.  The index should be used as a record of what a patient does, not as a record of what a patient could do. 2. The main aim is to establish degree of independence from any help, physical or verbal, however minor and for whatever reason. 3. The need for supervision renders the patient not independent. 4. A patient's performance should be established using the best available evidence. Asking the patient, friends/relatives and nurses are the usual sources, but direct observation and common sense are also important. However direct testing is not needed. 5. Usually the patient's performance over the preceding 24-48 hours is important, but occasionally longer periods will be relevant. 6. Middle categories imply that the patient supplies over 50 per cent of the effort. 7. Use of aids to be independent is allowed. Uvaldo Abbasi., Barthel, D.W. (2450). Functional evaluation: the Barthel Index. [facility] (14)2. Caryl Farris brina ANNITA John, Chuckie Briscoe, Juliocesar Florez., [place], [address] [place] [address] (1999). Measuring the change indisability after inpatient rehabilitation; comparison of the responsiveness of the Barthel Index and Functional [place] Measure. Journal of Neurology, Neurosurgery, and Psychiatry, 66(4), 244-669. Danny Joshua, N.J.A, NEIL Howell, & Antonio Navarro M.A. (2004.) Assessment of post-stroke quality of life in cost-effectiveness studies: The usefulness of the Barthel Index and the EuroQoL-5D.  Quality of Life Research, 15, 141-70           Occupational Therapy Evaluation Charge Determination   History Examination Decision-Making   LOW Complexity : Brief history review  LOW Complexity : 1-3 performance deficits relating to physical, cognitive , or psychosocial skils that result in activity limitations and / or participation restrictions  LOW Complexity : No comorbidities that affect functional and no verbal or physical assistance needed to complete eval tasks       Based on the above components, the patient evaluation is determined to be of the following complexity level: LOW   Pain:  Patient reports minimal sternal pain with exercises    Activity Tolerance:   VSS    After treatment patient left:  Sitting in chair  Call bell left within reach  RN notified  COMMUNICATION/EDUCATION:   The patient?s plan of care was discussed with: Physical Therapist and Registered Nurse. Home safety education was provided and the patient/caregiver indicated understanding., Patient/family have participated as able in goal setting and plan of care. and Patient/family agree to work toward stated goals and plan of care. This patient?s plan of care is appropriate for delegation to MARIANO.     Thank you for this referral.  Noelle Springer OT  Time Calculation: 18 mins

## 2019-07-09 NOTE — CARDIO/PULMONARY
Cardiac Rehab: CABG education folder at bedside. Met with Kassy Burton  and his friend Nancy Zamudio to begin cardiac surgery post discharge instructions and to discuss participation in the Cardiac Rehab Program.  
 
Educated using teach back method. Reviewed the use of bear for sternal support, daily weight and temperature monitoring, pain management and use of incentive spirometer. Kassy Burton was able to demonstrate proper use of incentive spirometer, achieving 1000 ml. .  
 
Discussed Cardiac Rehab Program format, benefits, and encouraged participation. The CWP at [facility] is closer to his home so contact information will be placed on his AVS. 
. General questions answered. Kassy Burton verbalized understanding.     
 
Will continue to follow for educational needs and enrollment in the Cardiac Rehab Program. Kandace Elizalde RN

## 2019-07-10 ENCOUNTER — APPOINTMENT (OUTPATIENT)
Dept: GENERAL RADIOLOGY | Age: 64
DRG: 236 | End: 2019-07-10
Attending: PHYSICIAN ASSISTANT
Payer: COMMERCIAL

## 2019-07-10 LAB
ADMINISTERED INITIALS, ADMINIT: NORMAL
ANION GAP SERPL CALC-SCNC: 8 MMOL/L (ref 5–15)
APTT PPP: 32.2 SEC (ref 22.1–32)
BUN SERPL-MCNC: 22 MG/DL (ref 6–20)
BUN/CREAT SERPL: 18 (ref 12–20)
CALCIUM SERPL-MCNC: 8.7 MG/DL (ref 8.5–10.1)
CHLORIDE SERPL-SCNC: 105 MMOL/L (ref 97–108)
CO2 SERPL-SCNC: 24 MMOL/L (ref 21–32)
CREAT SERPL-MCNC: 1.19 MG/DL (ref 0.7–1.3)
D50 ADMINISTERED, D50ADM: 0 ML
D50 ORDER, D50ORD: 0 ML
ERYTHROCYTE [DISTWIDTH] IN BLOOD BY AUTOMATED COUNT: 15.4 % (ref 11.5–14.5)
GLSCOM COMMENTS: NORMAL
GLUCOSE BLD STRIP.AUTO-MCNC: 101 MG/DL (ref 65–100)
GLUCOSE BLD STRIP.AUTO-MCNC: 109 MG/DL (ref 65–100)
GLUCOSE BLD STRIP.AUTO-MCNC: 119 MG/DL (ref 65–100)
GLUCOSE BLD STRIP.AUTO-MCNC: 123 MG/DL (ref 65–100)
GLUCOSE BLD STRIP.AUTO-MCNC: 135 MG/DL (ref 65–100)
GLUCOSE BLD STRIP.AUTO-MCNC: 162 MG/DL (ref 65–100)
GLUCOSE BLD STRIP.AUTO-MCNC: 168 MG/DL (ref 65–100)
GLUCOSE BLD STRIP.AUTO-MCNC: 179 MG/DL (ref 65–100)
GLUCOSE BLD STRIP.AUTO-MCNC: 180 MG/DL (ref 65–100)
GLUCOSE BLD STRIP.AUTO-MCNC: 83 MG/DL (ref 65–100)
GLUCOSE BLD STRIP.AUTO-MCNC: 83 MG/DL (ref 65–100)
GLUCOSE SERPL-MCNC: 82 MG/DL (ref 65–100)
GLUCOSE, GLC: 101 MG/DL
GLUCOSE, GLC: 109 MG/DL
GLUCOSE, GLC: 119 MG/DL
GLUCOSE, GLC: 123 MG/DL
GLUCOSE, GLC: 135 MG/DL
GLUCOSE, GLC: 168 MG/DL
GLUCOSE, GLC: 179 MG/DL
GLUCOSE, GLC: 83 MG/DL
GLUCOSE, GLC: 83 MG/DL
HCT VFR BLD AUTO: 32.4 % (ref 36.6–50.3)
HGB BLD-MCNC: 10.1 G/DL (ref 12.1–17)
HIGH TARGET, HITG: 130 MG/DL
INR PPP: 1.3 (ref 0.9–1.1)
INSULIN ADMINSTERED, INSADM: 0.5 UNITS/HOUR
INSULIN ADMINSTERED, INSADM: 0.7 UNITS/HOUR
INSULIN ADMINSTERED, INSADM: 1.5 UNITS/HOUR
INSULIN ADMINSTERED, INSADM: 1.6 UNITS/HOUR
INSULIN ADMINSTERED, INSADM: 1.8 UNITS/HOUR
INSULIN ADMINSTERED, INSADM: 2.3 UNITS/HOUR
INSULIN ADMINSTERED, INSADM: 3.2 UNITS/HOUR
INSULIN ADMINSTERED, INSADM: 4.8 UNITS/HOUR
INSULIN ADMINSTERED, INSADM: 5.4 UNITS/HOUR
INSULIN ORDER, INSORD: 0.5 UNITS/HOUR
INSULIN ORDER, INSORD: 0.7 UNITS/HOUR
INSULIN ORDER, INSORD: 1.5 UNITS/HOUR
INSULIN ORDER, INSORD: 1.6 UNITS/HOUR
INSULIN ORDER, INSORD: 1.8 UNITS/HOUR
INSULIN ORDER, INSORD: 2.3 UNITS/HOUR
INSULIN ORDER, INSORD: 3.2 UNITS/HOUR
INSULIN ORDER, INSORD: 4.8 UNITS/HOUR
INSULIN ORDER, INSORD: 5.4 UNITS/HOUR
LOW TARGET, LOT: 95 MG/DL
MAGNESIUM SERPL-MCNC: 2.1 MG/DL (ref 1.6–2.4)
MCH RBC QN AUTO: 25.1 PG (ref 26–34)
MCHC RBC AUTO-ENTMCNC: 31.2 G/DL (ref 30–36.5)
MCV RBC AUTO: 80.4 FL (ref 80–99)
MINUTES UNTIL NEXT BG, NBG: 120 MIN
MINUTES UNTIL NEXT BG, NBG: 60 MIN
MULTIPLIER, MUL: 0.02
MULTIPLIER, MUL: 0.03
MULTIPLIER, MUL: 0.04
MULTIPLIER, MUL: 0.04
MULTIPLIER, MUL: 0.05
MULTIPLIER, MUL: 0.05
NRBC # BLD: 0 K/UL (ref 0–0.01)
NRBC BLD-RTO: 0 PER 100 WBC
ORDER INITIALS, ORDINIT: NORMAL
PLATELET # BLD AUTO: 162 K/UL (ref 150–400)
PMV BLD AUTO: 11 FL (ref 8.9–12.9)
POTASSIUM SERPL-SCNC: 3.8 MMOL/L (ref 3.5–5.1)
PROTHROMBIN TIME: 12.7 SEC (ref 9–11.1)
RBC # BLD AUTO: 4.03 M/UL (ref 4.1–5.7)
SERVICE CMNT-IMP: ABNORMAL
SERVICE CMNT-IMP: NORMAL
SERVICE CMNT-IMP: NORMAL
SODIUM SERPL-SCNC: 137 MMOL/L (ref 136–145)
THERAPEUTIC RANGE,PTTT: ABNORMAL SECS (ref 58–77)
WBC # BLD AUTO: 13.6 K/UL (ref 4.1–11.1)

## 2019-07-10 PROCEDURE — 97530 THERAPEUTIC ACTIVITIES: CPT

## 2019-07-10 PROCEDURE — 65660000001 HC RM ICU INTERMED STEPDOWN

## 2019-07-10 PROCEDURE — 83735 ASSAY OF MAGNESIUM: CPT

## 2019-07-10 PROCEDURE — 74011636637 HC RX REV CODE- 636/637: Performed by: PHYSICIAN ASSISTANT

## 2019-07-10 PROCEDURE — 74011250637 HC RX REV CODE- 250/637: Performed by: PHYSICIAN ASSISTANT

## 2019-07-10 PROCEDURE — 85027 COMPLETE CBC AUTOMATED: CPT

## 2019-07-10 PROCEDURE — 85610 PROTHROMBIN TIME: CPT

## 2019-07-10 PROCEDURE — 80048 BASIC METABOLIC PNL TOTAL CA: CPT

## 2019-07-10 PROCEDURE — 97535 SELF CARE MNGMENT TRAINING: CPT

## 2019-07-10 PROCEDURE — 74011250636 HC RX REV CODE- 250/636: Performed by: PHYSICIAN ASSISTANT

## 2019-07-10 PROCEDURE — 36415 COLL VENOUS BLD VENIPUNCTURE: CPT

## 2019-07-10 PROCEDURE — 97110 THERAPEUTIC EXERCISES: CPT

## 2019-07-10 PROCEDURE — 74011636637 HC RX REV CODE- 636/637: Performed by: THORACIC SURGERY (CARDIOTHORACIC VASCULAR SURGERY)

## 2019-07-10 PROCEDURE — 74011000250 HC RX REV CODE- 250: Performed by: PHYSICIAN ASSISTANT

## 2019-07-10 PROCEDURE — 82962 GLUCOSE BLOOD TEST: CPT

## 2019-07-10 PROCEDURE — 85730 THROMBOPLASTIN TIME PARTIAL: CPT

## 2019-07-10 PROCEDURE — 97116 GAIT TRAINING THERAPY: CPT

## 2019-07-10 PROCEDURE — 71045 X-RAY EXAM CHEST 1 VIEW: CPT

## 2019-07-10 RX ORDER — METOPROLOL TARTRATE 25 MG/1
12.5 TABLET, FILM COATED ORAL EVERY 12 HOURS
Status: DISCONTINUED | OUTPATIENT
Start: 2019-07-10 | End: 2019-07-10

## 2019-07-10 RX ORDER — INSULIN GLARGINE 100 [IU]/ML
16 INJECTION, SOLUTION SUBCUTANEOUS ONCE
Status: COMPLETED | OUTPATIENT
Start: 2019-07-10 | End: 2019-07-10

## 2019-07-10 RX ADMIN — OXYCODONE HYDROCHLORIDE AND ACETAMINOPHEN 1 TABLET: 5; 325 TABLET ORAL at 15:21

## 2019-07-10 RX ADMIN — OXYCODONE HYDROCHLORIDE AND ACETAMINOPHEN 1 TABLET: 5; 325 TABLET ORAL at 03:33

## 2019-07-10 RX ADMIN — SENNOSIDES, DOCUSATE SODIUM 1 TABLET: 50; 8.6 TABLET, FILM COATED ORAL at 17:43

## 2019-07-10 RX ADMIN — MESALAMINE 800 MG: 800 TABLET, DELAYED RELEASE ORAL at 08:31

## 2019-07-10 RX ADMIN — MUPIROCIN: 20 OINTMENT TOPICAL at 20:43

## 2019-07-10 RX ADMIN — Medication 10 ML: at 15:21

## 2019-07-10 RX ADMIN — AMIODARONE HYDROCHLORIDE 400 MG: 200 TABLET ORAL at 08:07

## 2019-07-10 RX ADMIN — Medication 10 ML: at 08:12

## 2019-07-10 RX ADMIN — Medication 10 ML: at 23:46

## 2019-07-10 RX ADMIN — DIPHENHYDRAMINE HYDROCHLORIDE 25 MG: 25 CAPSULE ORAL at 10:20

## 2019-07-10 RX ADMIN — OXYCODONE HYDROCHLORIDE AND ACETAMINOPHEN 1 TABLET: 5; 325 TABLET ORAL at 20:43

## 2019-07-10 RX ADMIN — SENNOSIDES, DOCUSATE SODIUM 1 TABLET: 50; 8.6 TABLET, FILM COATED ORAL at 08:08

## 2019-07-10 RX ADMIN — MAGNESIUM OXIDE TAB 400 MG (241.3 MG ELEMENTAL MG) 400 MG: 400 (241.3 MG) TAB at 17:43

## 2019-07-10 RX ADMIN — FAMOTIDINE 20 MG: 20 TABLET ORAL at 08:31

## 2019-07-10 RX ADMIN — OXYCODONE HYDROCHLORIDE AND ACETAMINOPHEN 1000 MG: 500 TABLET ORAL at 08:07

## 2019-07-10 RX ADMIN — CHLORHEXIDINE GLUCONATE 10 ML: 1.2 RINSE ORAL at 20:43

## 2019-07-10 RX ADMIN — INSULIN LISPRO 2 UNITS: 100 INJECTION, SOLUTION INTRAVENOUS; SUBCUTANEOUS at 17:50

## 2019-07-10 RX ADMIN — FAMOTIDINE 20 MG: 20 TABLET ORAL at 20:43

## 2019-07-10 RX ADMIN — OXYCODONE HYDROCHLORIDE AND ACETAMINOPHEN 1 TABLET: 5; 325 TABLET ORAL at 08:07

## 2019-07-10 RX ADMIN — FERROUS SULFATE TAB 325 MG (65 MG ELEMENTAL FE) 325 MG: 325 (65 FE) TAB at 08:08

## 2019-07-10 RX ADMIN — ATORVASTATIN CALCIUM 80 MG: 40 TABLET, FILM COATED ORAL at 08:07

## 2019-07-10 RX ADMIN — METOPROLOL TARTRATE 12.5 MG: 25 TABLET ORAL at 09:49

## 2019-07-10 RX ADMIN — Medication 2 G: at 03:33

## 2019-07-10 RX ADMIN — Medication 10 ML: at 20:45

## 2019-07-10 RX ADMIN — MUPIROCIN: 20 OINTMENT TOPICAL at 08:09

## 2019-07-10 RX ADMIN — MAGNESIUM OXIDE TAB 400 MG (241.3 MG ELEMENTAL MG) 400 MG: 400 (241.3 MG) TAB at 08:07

## 2019-07-10 RX ADMIN — CHLORHEXIDINE GLUCONATE 10 ML: 1.2 RINSE ORAL at 10:17

## 2019-07-10 RX ADMIN — POLYETHYLENE GLYCOL 3350 17 G: 17 POWDER, FOR SOLUTION ORAL at 08:07

## 2019-07-10 RX ADMIN — INSULIN GLARGINE 16 UNITS: 100 INJECTION, SOLUTION SUBCUTANEOUS at 10:17

## 2019-07-10 RX ADMIN — AMIODARONE HYDROCHLORIDE 400 MG: 200 TABLET ORAL at 20:43

## 2019-07-10 RX ADMIN — ASPIRIN 81 MG 81 MG: 81 TABLET ORAL at 08:07

## 2019-07-10 NOTE — DIABETES MGMT
[facility]     [facility] Cardiac Surgery Progress Note    Recommendations/ Comments:  Pt arrived to CVICU at 1147 on 7/8/19. Consider continuing insulin gtt for at least 48hrs post-op and eating 50% solid foods then,  1) transition off gtt per [employer] Protocol   2) continue accu-checks and humalog correctional insulin ac & hs until pt has 3 consecutive BG <150mg/dl off gtt then discontinue  Insulin gtt should not be stopped until after 1147 on 7/10/19 to complete 48hr post-op time frame. Currently on insulin gtt. Drip rate currently running at 3.2 units/hr. Pt has required 16.3 units in the last 6 hours. Chart reviewed on [employer]. Patient is 58 y.o. male s/p Cardiac surgery POD 2. Pt with no observed hx of DM. A1c:   Lab Results   Component Value Date/Time    Hemoglobin A1c 5.9 07/05/2019 04:45 PM         Recent Glucose Results:   Lab Results   Component Value Date/Time    GLU 82 07/10/2019 03:41 AM    GLUCPOC 123 (H) 07/10/2019 11:18 AM    GLUCPOC 168 (H) 07/10/2019 10:13 AM    GLUCPOC 179 (H) 07/10/2019 09:02 AM        Lab Results   Component Value Date/Time    Creatinine 1.19 07/10/2019 03:41 AM     Estimated Creatinine Clearance: 68.8 mL/min (based on SCr of 1.19 mg/dL). Active Orders   Diet    DIET CARDIAC Regular        PO intake:   Patient Vitals for the past 72 hrs:   % Diet Eaten   07/10/19 1345 75 %   07/09/19 1520 50 %   07/09/19 0800 75 %   07/07/19 1520 100 %       Will continue to follow as needed. Thank you.   Jeny Lua RD, Diabetes Clinician    Time spent: 3 minutes

## 2019-07-10 NOTE — PROGRESS NOTES
Problem: Mobility Impaired (Adult and Pediatric)  Goal: *Acute Goals and Plan of Care (Insert Text)  Description  Physical Therapy Goals  Initiated 7/9/2019  1. Patient will move from supine to sit and sit to supine, scoot up and down and roll side to side in bed with modified independence within 5 days. 2.  Patient will perform sit to/from stand with independence within 5 days. 3.  Patient will ambulate 300 feet with independence within 5 days. 4.  Patient will ascend/descend 4 stairs with handrail(s), per home set-up, with modified independence within 5 days. 5.  Patient will perform cardiac exercises per protocol with independence within 5 days. 6.  Patient will verbally and functionally recall 3/3 sternal precautions within 5 days. Outcome: Progressing Towards Goal     PHYSICAL THERAPY TREATMENT  Patient: Lesli Farias (64 y.o. male)  Date: 7/10/2019  Diagnosis: CAD (coronary artery disease) [I25.10] <principal problem not specified>  Procedure(s) (LRB):  CABG X 3 WITH CPB; DONOR SITES: LIMA AND LEFT SAPHENOUS VEIN VIA EVH; BERTRAND AND EPIAORTIC BY DR. WOLF (N/A) 2 Days Post-Op  Precautions: Fall, Sternal, Deaf in R ear [facility])  Chart, physical therapy assessment, plan of care and goals were reviewed. ASSESSMENT:  Patient continues to make steady function progress towards his short-term goals. Patient able to recall 3/3 sternal precautions prior to mobility training. Overall, patient demonstrated standby assist (+ verbal cuing for technique) bed mobility, standby assist functional transfers from varying surface heights, standby assist standing UE cardiac exercises (3/6), and contact guard assist ambulation x 300 ft. After approximately 250 ft, patient reported his head feeling \"whishy washy\" - associated with increased trunk sway. Returned to room and supine position in bed.      BP prior to mobility: 123/64 mmHg  BP post mobility: 106/58 mmHg  SpO2 97% on room air    Anticipate steady functional gains once BP control achieved. Therefore, recommend discharge home with HHPT vs transition to OP Cardiac Rehab. Prior Level of Function: Lives alone, works 70+ hours per week, cardio exercises + weight-lifting 2-3 times per week. Progression toward goals:  ?      Improving appropriately and progressing toward goals  ? Improving slowly and progressing toward goals  ? Not making progress toward goals and plan of care will be adjusted     PLAN:  Patient continues to benefit from skilled intervention to address the above impairments. Continue treatment per established plan of care. Discharge Recommendations:  Home Health vs. OP Cardiac Rehab  Further Equipment Recommendations for Discharge:  None      SUBJECTIVE:   Patient stated ? I feel okay. ? - Patient with vague report of symptoms     OBJECTIVE DATA SUMMARY:   Patient mobilized on continuous portable monitor/telemetry. Critical Behavior:  Neurologic State: Alert  Orientation Level: Oriented X4  Cognition: Appropriate decision making, Appropriate for age attention/concentration, Appropriate safety awareness  Safety/Judgement: Awareness of environment, Insight into deficits  Functional Mobility Training:  Bed Mobility:  Log    Supine to Sit: Additional time;Stand-by assistance(+ HOB flat)  Sit to Supine: Additional time;Stand-by assistance(+ HOB flat)    Transfers:  Stand to Sit: Stand-by assistance  Bed to Chair: Stand-by assistance    Balance:  Sitting: Intact  Standing: Impaired; Without support  Standing - Static: Good  Standing - Dynamic : Fair    Ambulation/Gait Training:  Distance (ft): 300 Feet (ft)(+ External pacing cues)  Assistive Device: Gait belt  Ambulation - Level of Assistance: Contact guard assistance        Gait Abnormalities: Altered arm swing;Decreased step clearance(+ Minor path deviations)        Base of Support: Narrowed        Step Length: Right shortened;Left shortened    Therapeutic Exercises: CARDIAC  EXERCISE   Sets   Reps   Active Active Assist   Passive Self ROM   Comments   Shoulder flexion 1 5 ?                                            ?                                            ?                                            ?                                               Shoulder abduction 1     5 ?                                            ?                                            ?                                            ?                                               Scapular retraction 1 5 ?                                            ?                                            ?                                            ?                                                 Pain:  Pain Scale 1: Numeric (0 - 10)  Pain Intensity 1: 3    Activity Tolerance:  Orthostatic - Patient very poor at conveying symptoms  Please refer to the flowsheet for vital signs taken during this treatment. After treatment:   ? Patient left in no apparent distress sitting up in chair  ? Patient left in no apparent distress in bed  ? Call bell left within reach  ? Nursing notified  ? Caregiver present  ?  Bed alarm activated    COMMUNICATION/COLLABORATION:   The patient?s plan of care was discussed with: Occupational Therapist, Registered Nurse and     Gosia Lindo PT, DPT   Time Calculation: 24 mins

## 2019-07-10 NOTE — PROGRESS NOTES
1000: Chest tubes out.   1200: insulin gtt d/c per protocol. 1320: Pt BP 88/62 post activity. Pt placed back in bed. /55. Pt asymptomatic. Will monitor. 1450: Pt ambulating in [place] with RN. Pt began to feel dizzy displaying unsteady gait. Pt assisted to chair. BP stable. Santiago Rueda NP notified. 1930: Bedside shift change report given to Ilan (oncoming nurse) by Jona Anne RN (offgoing nurse). Report included the following information SBAR, Kardex, Procedure Summary, Intake/Output, MAR and Recent Results      Cardiac Surgery Shift Summary:    1. I/O's: Intake:   Meals: 50-75% of each meal (good)     Output: WDL (voiding without difficulty)   Has patient had BM since surgery? No     2. Oxygen Requirements: WDL (on room air)    3. Daily Weights (weight change in 24 hours): No significant change in weight (0 - 2 lbs.)    4.  Medication Administration: No variations to medication administration

## 2019-07-10 NOTE — PROGRESS NOTES
Transitions of Care: The patient has been authorized for home health services with [facility]. PT/OT working with the patient- PT recommendations from yesterday indicate possible home health, may need HH PT order added. CM will continue to follow for transitions of care. DEBRA Calderon CM called and spoke with Frank with [facility]- aware of possible discharge tomorrow pending patient progress- anticipate patient will need HH PT order added, Frank aware. The CM met with the patient at bedside to discuss supports- the patient lives at home alone, has limited supports s/p CABG. The patient endorsed that his ex-wife [employer] teaches summer school Monday, Tuesday, Wednesday, however, the patient endorsed [employer] would be available for support on Thursday-Sundays for the next month approximately. The CM provided the patient with a private duty list about hiring additional help to assist with household chores, errands, etc. CM also provided the patient with \"Meals for Mom\" information where the patient could pay to have meals delivered to his home at low cost- nation wide program. The CM informed patient Alyse Beasley also have home delivery for groceries if there is a local [employer] in the patient's area. Patient verbalized [employer] will transport home, will be available for some support for the next month- not 24/7. The patient is more concerned about walking his dogs- CM will provide local animal shelter information (Animal Shelters often has volunteers that may be able assist with dog walking).  DEBRA Calderon

## 2019-07-10 NOTE — PROGRESS NOTES
[facility]  ICU Progress Note    Admit Date: 2019  POD:  2 Day Post-Op    Procedure:  Procedure(s):  CABG X 3 WITH CPB; DONOR SITES: LIMA AND LEFT SAPHENOUS VEIN VIA EVH; BERTRAND AND EPIAORTIC BY DR. WOLF        Subjective:   Pt seen with Dr. Gorge Willams. Feels well. On RA. NSR. Objective:   Vitals:  Blood pressure 112/59, pulse 77, temperature 98.4 °F (36.9 °C), resp. rate 18, weight 190 lb 7.6 oz (86.4 kg), SpO2 92 %. Temp (24hrs), Av.1 °F (36.7 °C), Min:97.3 °F (36.3 °C), Max:98.4 °F (36.9 °C)    Hemodynamics:   CO: CO (l/min): 5 l/min   CI: CI (l/min/m2): 2.7 l/min/m2   CVP: CVP (mmHg): 5 mmHg (19 1000)   SVR: SVR (dyne*sec)/cm5: 988 (dyne*sec)/cm5 (19 7661)   PAP Systolic: PAP Systolic: 21 (81 3278)   PAP Diastolic: PAP Diastolic: 8 ( 2405)   PVR:     SV02: SVO2 (%): 54 % (19 1000)   SCV02:      EKG/Rhythm:  Apaced - SB under    CT Output: 90 (230 in 24 hrs)    Oxygen Therapy:  Oxygen Therapy  O2 Sat (%): 92 % (07/10/19 0736)  Pulse via Oximetry: 66 beats per minute (19 1000)  O2 Device: Room air (19)  O2 Flow Rate (L/min): 1 l/min (19 0900)  FIO2 (%): 50 % (19 1430)    CXR:  CXR Results  (Last 48 hours)               07/10/19 0516  XR CHEST PORT Final result    Impression:  IMPRESSION:       Increased mild bibasilar atelectasis. Narrative:  EXAM:  XR CHEST PORT       INDICATION: Heart surgery. Coronary artery disease. COMPARISON: 2019 at 0420 hours       TECHNIQUE: Portable AP semiupright chest view at 0421 hours       FINDINGS: The right IJ pulmonary artery catheter has been removed. The   mediastinal and left chest range are stable. Sternal wires are present. Cardiac   monitoring wires overlie the thorax. The cardiomediastinal contours are stable. The pulmonary vasculature is within normal limits. There are increased mild bibasilar opacities. There is no pleural effusion or   pneumothorax.  The bones and upper abdomen are stable. 07/09/19 0456  XR CHEST PORT Final result    Impression:  IMPRESSION: No significant change. Narrative:  INDICATION:  postop heart       EXAM: CXR Portable. FINDINGS: Portable chest shows extubation and NG tube removal with otherwise   stable support lines since yesterday. There is no apparent pneumothorax. Lungs   show no acute findings. Heart size is stable. There is no overt pulmonary edema. 07/08/19 1212  XR CHEST PORT Final result    Impression:  IMPRESSION:    Multiple new tubes and lines, in satisfactory position. Mild linear atelectasis   in the left midlung. Narrative:  INDICATION:    postop heart        EXAMINATION:  AP CHEST, PORTABLE       COMPARISON: July 5       FINDINGS: Single AP portable view of the chest at 1156 hours demonstrates   interval ET tube placement, in satisfactory position. There is an NG tube   entering the stomach. There is a right IJ Whitewood-Anisha catheter, tip overlying the   main pulmonary outflow tract. There are also interval postsurgical changes of   median sternotomy and CABG. There is no evidence of pneumothorax. The   cardiomediastinal silhouette is stable. There is linear atelectasis in the left   midlung. Chest tube is noted. Admission Weight: Last Weight   Weight: 185 lb 6.5 oz (84.1 kg) Weight: 190 lb 7.6 oz (86.4 kg)     Intake / Output / Drain:  Current Shift: No intake/output data recorded. Last 24 hrs.:     Intake/Output Summary (Last 24 hours) at 7/10/2019 0802  Last data filed at 7/9/2019 2309  Gross per 24 hour   Intake 950.13 ml   Output 180 ml   Net 770.13 ml       EXAM:  General:   NAD                                                                                           Lungs:   Clear to auscultation bilaterally. Incision:  Dsg CDI   Heart:  Regular rate and rhythm, S1, S2 normal, no murmur, click, rub or gallop. Abdomen:   Soft, non-tender.  Bowel sounds normal. No masses,  No organomegaly. Extremities:  No edema. PPP. Neurologic:  Gross motor and sensory apparatus intact. Labs:   Recent Labs     07/10/19  0651  07/10/19  0341   WBC  --   --  13.6*   HGB  --   --  10.1*   HCT  --   --  32.4*   PLT  --   --  162   NA  --   --  137   K  --   --  3.8   BUN  --   --  22*   CREA  --   --  1.19   GLU  --   --  82   GLUCPOC 119*   < >  --    INR  --   --  1.3*    < > = values in this interval not displayed. Assessment:     Active Problems:    CAD (coronary artery disease) (2019)      S/P CABG x 3 (2019)         Plan/Recommendations/Medical Decision Makin. CAD/NSTEMI s/p CABG: cont ASA/Statin, low dose BB  2.  HTN: low dose BB  3. GERD: pepcid for SUP, tums prn  4. Chronic ulcerative colitis: controlled w/ diet.  cont mesalamine. 5. Bradycardia: Cont PO per Fiser. Hold BB for now. 6. Anticipated acute blood loss anemia: On Fe/Vit C. Monitor HH  7. Thrombocytopenia: mild. Cont ASA. 8. Leukocytosis: mild. Likely related to post op state. Mobilize. 9. Atelectasis: IS, coughing, deep breathing. 10. Dispo: PT/OT. DC CT. Work on [employer] as has no social support at home.         Signed By: GHULAM Burton

## 2019-07-10 NOTE — PROGRESS NOTES
Problem: Self Care Deficits Care Plan (Adult)  Goal: *Acute Goals and Plan of Care (Insert Text)  Description  Occupational Therapy Goals  Initiated 7/9/2019  1. Patient will perform ADLs standing 5 mins without fatigue or LOB with supervision/set-up within 5 day(s). 2.  Patient will perform lower body ADLs with supervision/set-up within 5 day(s). 3.  Patient will perform gathering ADL items high and low 2/2 with supervision/set-up within 5 day(s). 4.  Patient will perform toilet transfers with supervision/set-up within 5 day(s). 5.  Patient will perform all aspects of toileting with supervision/set-up within 5 day(s). 6.  Patient will participate in cardiac/sternal upper extremity therapeutic exercise/activities to increase independence with ADLs with supervision/set-up for 5 minutes within 5 day(s). 7/10/2019 1343 by Nazario Ferris OT  Outcome: Progressing Towards Goal    OCCUPATIONAL THERAPY TREATMENT  Patient: Batsheva Galloway (21 y.o. male)  Date: 7/10/2019  Diagnosis: CAD (coronary artery disease) [I25.10] <principal problem not specified>  Procedure(s) (LRB):  CABG X 3 WITH CPB; DONOR SITES: LIMA AND LEFT SAPHENOUS VEIN VIA EVH; BERTRAND AND EPIAORTIC BY DR. WOLF (N/A) 2 Days Post-Op  Precautions: Fall, Sternal no pushing, pulling or picking up over 5 lbs  Chart, occupational therapy assessment, plan of care, and goals were reviewed. ASSESSMENT:   Based on the objective data described below, patient presents with Setup upper body ADLs and Contact guard assistance lower body ADLs, Stand-by assistance for supine<>sit, and Contact guard for sit-stand/ ambulating within room/ reaching in upper cabinets and low drawers. Session limited by hypotension (see vitals chart), RN notified. Patient repeatedly reported he felt \"fine\" but demonstrated trunk sway, drowsiness, and decreased processing/ responsiveness/ command following, improving with return to supine.     Vitals:    07/10/19 1314 07/10/19 1316 07/10/19 1318 07/10/19 1322   BP: 93/60 (!) 69/46 (!) 88/62 97/55   BP 1 Location: Left arm Left arm Left arm Left arm   BP Patient Position: During activity;Standing after ~5 min standing activity Supine Trendelenburg Trendelenburg   Pulse: 71 70 70 69        Prior level of function: Independent with ADLs/ IADLs ambulatory with no AD. Working, designing security systems for correctional facilities, and driving. Reports no falls. Living alone. PLAN:  Patient continues to benefit from skilled intervention to address the above impairments. Continue treatment per established plan of care. Discharge recommendations: Home health OT pending progress/ BP management/ activity tolerance       SUBJECTIVE:   Patient stated ? I feel fine. ?    OBJECTIVE DATA SUMMARY:   Cognitive/Behavioral Status:  Neurologic State: Alert  Orientation Level: Oriented X4  Cognition: Appropriate decision making; Appropriate for age attention/concentration; Appropriate safety awareness             Functional Mobility and Transfers for ADLs:  Bed Mobility:  Supine to Sit: Stand-by assistance; Additional time(with HOB flat)  Sit to Supine: Stand-by assistance; Additional time(with HOB flat)    Transfers:        Bed to Chair: Contact guard assistance    Balance:  Sitting: Intact  Standing: Impaired  Standing - Static: Good  Standing - Dynamic : Fair    ADL Intervention:       Patient instructed no asymmetrical reaching over head to ensure B UEs when shoulders >90* i.e. reaching in cabinets and dressing. Instruction on upper body dressing techniques of over head, then arms through to decrease pain and unilateral shoulder flexion >90*. Instruction on the benefits of utilizing B UEs during functional tasks i.e. opening the fridge, stepping into the tub.      May have to adjust home setup to increase ease with items closer to waist height to prevent deep bending and the automatic  of asymmetrical UE WB/pushing for stabilization during bending. Benefit to don clothing tailor sitting and don all clothing while sitting prior to standing.      Therapeutic Exercises:   Patient reports he has been performing UE cardiac exercises independently     Pain:  Patient reports no pain    Activity Tolerance:   Vitals:    07/10/19 1314 07/10/19 1316 07/10/19 1318 07/10/19 1322   BP: 93/60 (!) 69/46 (!) 88/62 97/55   BP 1 Location: Left arm Left arm Left arm Left arm   BP Patient Position: During activity;Standing Supine Supine Trendelenburg   Pulse: 71 70 70 69        After treatment patient left:   Supine in bed  Call light within reach  RN notified     COMMUNICATION/COLLABORATION:   The patient?s plan of care was discussed with: Physical Therapist and Registered Nurse    Justin Childers OT  Time Calculation: 24 mins

## 2019-07-10 NOTE — PROGRESS NOTES
CT x 3 cleaned and removed without difficulty. Patient tolerated it well. Denies chest pain, SOB. CXR Pa/lat ordered for tomorrow. AV pacing wires remain intact.

## 2019-07-11 ENCOUNTER — APPOINTMENT (OUTPATIENT)
Dept: GENERAL RADIOLOGY | Age: 64
DRG: 236 | End: 2019-07-11
Attending: PHYSICIAN ASSISTANT
Payer: COMMERCIAL

## 2019-07-11 LAB
ANION GAP SERPL CALC-SCNC: 7 MMOL/L (ref 5–15)
BUN SERPL-MCNC: 18 MG/DL (ref 6–20)
BUN/CREAT SERPL: 17 (ref 12–20)
CALCIUM SERPL-MCNC: 8.6 MG/DL (ref 8.5–10.1)
CHLORIDE SERPL-SCNC: 106 MMOL/L (ref 97–108)
CO2 SERPL-SCNC: 25 MMOL/L (ref 21–32)
CREAT SERPL-MCNC: 1.09 MG/DL (ref 0.7–1.3)
ERYTHROCYTE [DISTWIDTH] IN BLOOD BY AUTOMATED COUNT: 15.5 % (ref 11.5–14.5)
GLUCOSE BLD STRIP.AUTO-MCNC: 124 MG/DL (ref 65–100)
GLUCOSE SERPL-MCNC: 116 MG/DL (ref 65–100)
HCT VFR BLD AUTO: 32.2 % (ref 36.6–50.3)
HGB BLD-MCNC: 10.1 G/DL (ref 12.1–17)
MAGNESIUM SERPL-MCNC: 2.2 MG/DL (ref 1.6–2.4)
MCH RBC QN AUTO: 25.1 PG (ref 26–34)
MCHC RBC AUTO-ENTMCNC: 31.4 G/DL (ref 30–36.5)
MCV RBC AUTO: 79.9 FL (ref 80–99)
NRBC # BLD: 0 K/UL (ref 0–0.01)
NRBC BLD-RTO: 0 PER 100 WBC
PLATELET # BLD AUTO: 169 K/UL (ref 150–400)
PMV BLD AUTO: 10.8 FL (ref 8.9–12.9)
POTASSIUM SERPL-SCNC: 3.8 MMOL/L (ref 3.5–5.1)
RBC # BLD AUTO: 4.03 M/UL (ref 4.1–5.7)
SERVICE CMNT-IMP: ABNORMAL
SODIUM SERPL-SCNC: 138 MMOL/L (ref 136–145)
WBC # BLD AUTO: 13.8 K/UL (ref 4.1–11.1)

## 2019-07-11 PROCEDURE — 74011250637 HC RX REV CODE- 250/637: Performed by: PHYSICIAN ASSISTANT

## 2019-07-11 PROCEDURE — 36415 COLL VENOUS BLD VENIPUNCTURE: CPT

## 2019-07-11 PROCEDURE — 82962 GLUCOSE BLOOD TEST: CPT

## 2019-07-11 PROCEDURE — 85027 COMPLETE CBC AUTOMATED: CPT

## 2019-07-11 PROCEDURE — 74011250637 HC RX REV CODE- 250/637: Performed by: THORACIC SURGERY (CARDIOTHORACIC VASCULAR SURGERY)

## 2019-07-11 PROCEDURE — 97116 GAIT TRAINING THERAPY: CPT

## 2019-07-11 PROCEDURE — 80048 BASIC METABOLIC PNL TOTAL CA: CPT

## 2019-07-11 PROCEDURE — 71046 X-RAY EXAM CHEST 2 VIEWS: CPT

## 2019-07-11 PROCEDURE — 65660000001 HC RM ICU INTERMED STEPDOWN

## 2019-07-11 PROCEDURE — 83735 ASSAY OF MAGNESIUM: CPT

## 2019-07-11 RX ORDER — ADHESIVE BANDAGE
30 BANDAGE TOPICAL ONCE
Status: ACTIVE | OUTPATIENT
Start: 2019-07-11 | End: 2019-07-12

## 2019-07-11 RX ORDER — LORATADINE 10 MG/1
10 TABLET ORAL DAILY
Status: DISCONTINUED | OUTPATIENT
Start: 2019-07-11 | End: 2019-07-12 | Stop reason: HOSPADM

## 2019-07-11 RX ADMIN — Medication 10 ML: at 07:30

## 2019-07-11 RX ADMIN — MAGNESIUM OXIDE TAB 400 MG (241.3 MG ELEMENTAL MG) 400 MG: 400 (241.3 MG) TAB at 17:30

## 2019-07-11 RX ADMIN — FAMOTIDINE 20 MG: 20 TABLET ORAL at 09:07

## 2019-07-11 RX ADMIN — CHLORHEXIDINE GLUCONATE 10 ML: 1.2 RINSE ORAL at 20:44

## 2019-07-11 RX ADMIN — AMIODARONE HYDROCHLORIDE 400 MG: 200 TABLET ORAL at 09:07

## 2019-07-11 RX ADMIN — SENNOSIDES, DOCUSATE SODIUM 1 TABLET: 50; 8.6 TABLET, FILM COATED ORAL at 17:30

## 2019-07-11 RX ADMIN — AMIODARONE HYDROCHLORIDE 400 MG: 200 TABLET ORAL at 20:45

## 2019-07-11 RX ADMIN — Medication 10 ML: at 13:38

## 2019-07-11 RX ADMIN — OXYCODONE HYDROCHLORIDE AND ACETAMINOPHEN 1 TABLET: 5; 325 TABLET ORAL at 20:45

## 2019-07-11 RX ADMIN — MUPIROCIN: 20 OINTMENT TOPICAL at 20:44

## 2019-07-11 RX ADMIN — OXYCODONE HYDROCHLORIDE AND ACETAMINOPHEN 1 TABLET: 5; 325 TABLET ORAL at 11:51

## 2019-07-11 RX ADMIN — LORATADINE 10 MG: 10 TABLET ORAL at 20:45

## 2019-07-11 RX ADMIN — Medication 20 ML: at 20:44

## 2019-07-11 RX ADMIN — MESALAMINE 800 MG: 800 TABLET, DELAYED RELEASE ORAL at 09:07

## 2019-07-11 RX ADMIN — OXYCODONE HYDROCHLORIDE AND ACETAMINOPHEN 1 TABLET: 5; 325 TABLET ORAL at 04:01

## 2019-07-11 RX ADMIN — SENNOSIDES, DOCUSATE SODIUM 1 TABLET: 50; 8.6 TABLET, FILM COATED ORAL at 09:07

## 2019-07-11 RX ADMIN — MAGNESIUM OXIDE TAB 400 MG (241.3 MG ELEMENTAL MG) 400 MG: 400 (241.3 MG) TAB at 09:07

## 2019-07-11 RX ADMIN — POLYETHYLENE GLYCOL 3350 17 G: 17 POWDER, FOR SOLUTION ORAL at 09:09

## 2019-07-11 RX ADMIN — MUPIROCIN: 20 OINTMENT TOPICAL at 09:09

## 2019-07-11 RX ADMIN — DIPHENHYDRAMINE HYDROCHLORIDE 25 MG: 25 CAPSULE ORAL at 20:45

## 2019-07-11 RX ADMIN — ASPIRIN 81 MG 81 MG: 81 TABLET ORAL at 09:07

## 2019-07-11 RX ADMIN — CHLORHEXIDINE GLUCONATE 10 ML: 1.2 RINSE ORAL at 09:09

## 2019-07-11 RX ADMIN — OXYCODONE HYDROCHLORIDE AND ACETAMINOPHEN 1000 MG: 500 TABLET ORAL at 09:07

## 2019-07-11 RX ADMIN — FERROUS SULFATE TAB 325 MG (65 MG ELEMENTAL FE) 325 MG: 325 (65 FE) TAB at 09:07

## 2019-07-11 RX ADMIN — FAMOTIDINE 20 MG: 20 TABLET ORAL at 20:45

## 2019-07-11 RX ADMIN — ATORVASTATIN CALCIUM 80 MG: 40 TABLET, FILM COATED ORAL at 09:07

## 2019-07-11 NOTE — PROGRESS NOTES
Patient visited by [facility] Partner on Cardiovascular Unit on 7/11/2019.     Rev.  Bella Riggs MDiv, [facility], [facility] service: [phone])

## 2019-07-11 NOTE — PROGRESS NOTES
0730:  Bedside shift change report given to Rosy Baker (oncoming nurse) by Janelle Saab (offgoing nurse). Report included the following information SBAR, Kardex, MAR and Recent Results. 1520:  Pt verbalized having a bowel movement and is now refusing milk of magnesia. 1930:  Bedside shift change report given to Jo Dietrich (oncoming nurse) by Rosy Baker (offgoing nurse). Report included the following information SBAR, Kardex, MAR and Recent Results.

## 2019-07-11 NOTE — PROGRESS NOTES
Hospital follow-up new PCP transitional care appointment has been scheduled with Dr. Jeremy Oswald for Thursday, 8/22/19 at 8:30 a.m. Pending patient discharge.   Deb Langford, Care Management Specialist.

## 2019-07-11 NOTE — PROGRESS NOTES
Cardiac Surgery Shift Summary:    1. I/O's: Intake:   Meals: Greater than 75% of each meal (excellent)     Output: WDL (voiding without difficulty) or Other (comment)-Voids in commode=inaccurate I&O's   Has patient had BM since surgery? No     2. Oxygen Requirements: WDL (on room air)    3. Daily Weights (weight change in 24 hours): No significant change in weight (0 - 2 lbs.)    4. Medication Administration: Medication held due to other (comment required) Reports of dizziness/low BP during dayshift=Discontinued meds.  BP good overnight

## 2019-07-11 NOTE — PROGRESS NOTES
[facility]  Progress Note    Admit Date: 2019  POD:  3 Day Post-Op    Procedure:  Procedure(s):  CABG X 3 WITH CPB; DONOR SITES: LIMA AND LEFT SAPHENOUS VEIN VIA EVH; BERTRAND AND EPIAORTIC BY DR. WOLF        Subjective:   Pt seen with Dr. David Madrigal. Looks great, wants to go home. Ambulating without difficulties. No BM, +Flatus     Objective:   Vitals:  Blood pressure 130/77, pulse 87, temperature 98.3 °F (36.8 °C), resp. rate 16, weight 192 lb 3.9 oz (87.2 kg), SpO2 96 %. Temp (24hrs), Av.3 °F (36.8 °C), Min:97 °F (36.1 °C), Max:98.8 °F (37.1 °C)    Hemodynamics:   CO: CO (l/min): 5 l/min   CI: CI (l/min/m2): 2.7 l/min/m2   CVP: CVP (mmHg): 5 mmHg (19 1000)   SVR: SVR (dyne*sec)/cm5: 988 (dyne*sec)/cm5 (19 5385)   PAP Systolic: PAP Systolic: 21 (88/62/81 9770)   PAP Diastolic: PAP Diastolic: 8 (62/92/84 9578)   PVR:     SV02: SVO2 (%): 54 % (19 1000)   SCV02:      EKG/Rhythm:  SR    Oxygen Therapy:  Oxygen Therapy  O2 Sat (%): 96 % (19 0814)  Pulse via Oximetry: 66 beats per minute (19 1000)  O2 Device: Room air (07/10/19 2047)  O2 Flow Rate (L/min): 1 l/min (19 0900)  FIO2 (%): 50 % (19 1430)    CXR:  CXR Results  (Last 48 hours)               07/10/19 0516  XR CHEST PORT Final result    Impression:  IMPRESSION:       Increased mild bibasilar atelectasis. Narrative:  EXAM:  XR CHEST PORT       INDICATION: Heart surgery. Coronary artery disease. COMPARISON: 2019 at 0420 hours       TECHNIQUE: Portable AP semiupright chest view at 0421 hours       FINDINGS: The right IJ pulmonary artery catheter has been removed. The   mediastinal and left chest range are stable. Sternal wires are present. Cardiac   monitoring wires overlie the thorax. The cardiomediastinal contours are stable. The pulmonary vasculature is within normal limits. There are increased mild bibasilar opacities. There is no pleural effusion or   pneumothorax.  The bones and upper abdomen are stable. Admission Weight: Last Weight   Weight: 185 lb 6.5 oz (84.1 kg) Weight: 192 lb 3.9 oz (87.2 kg)     Intake / Output / Drain:  Current Shift: No intake/output data recorded. Last 24 hrs.:     Intake/Output Summary (Last 24 hours) at 2019 1025  Last data filed at 2019 0403  Gross per 24 hour   Intake 710 ml   Output 350 ml   Net 360 ml       EXAM:  General:   NAD                                                                                           Lungs:   Clear to auscultation bilaterally. Incision:  CDI   Heart:  Regular rate and rhythm, S1, S2 normal, no murmur, click, rub or gallop. Abdomen:   Soft, non-tender. Bowel sounds normal. No masses,  No organomegaly. Extremities:  No edema. PPP. Neurologic:  Gross motor and sensory apparatus intact. Labs:   Recent Labs     19  0728 19  0359  07/10/19  0341   WBC  --  13.8*  --  13.6*   HGB  --  10.1*  --  10.1*   HCT  --  32.2*  --  32.4*   PLT  --  169  --  162   NA  --  138  --  137   K  --  3.8  --  3.8   BUN  --  18  --  22*   CREA  --  1.09  --  1.19   GLU  --  116*  --  82   GLUCPOC 124*  --    < >  --    INR  --   --   --  1.3*    < > = values in this interval not displayed. Assessment:     Active Problems:    CAD (coronary artery disease) (2019)      S/P CABG x 3 (2019)         Plan/Recommendations/Medical Decision Makin. CAD/NSTEMI s/p CABG: cont ASA/Statin, BB stopped due to orthostatic hypotension. 2. HTN: BB stopped due to above. May restart outpatient if needed. 3. GERD: pepcid for SUP, tums prn  4. Chronic ulcerative colitis: controlled w/ diet.  cont mesalamine. 5. Bradycardia: Cont PO per Fiser. Hold BB for now. 6. Anticipated acute blood loss anemia: On Fe/Vit C. Monitor HH  7. Thrombocytopenia: resolved  8. Leukocytosis: mild. Likely related to post op state. Mobilize. 9. Atelectasis: IS, coughing, deep breathing.   10. Constipation: miralax pericolace this am. Give MOM this afternoon if no BM    Dispo: PT/OT. AV wires discontinued without issues. Bedrest x 1 hour, RN notified. D/c today if he has a BM    UPDATE: patient hasn't had a BM after several trips to the bathroom. MOM ordered, suppository tomorrow AM if no BM. Keep today.       Signed By: Carmine Severe, PA

## 2019-07-11 NOTE — CDMP QUERY
#1 
 
Pt admitted with CAD/Pt noted to have  \"Anticipated post op resp insufficiency\" Shanthi Calix If possible, please document in the progress notes and d/c summary if you are evaluating and / or treating any of the following: 
 
Acute Postop Pulmonary Insufficiency Post op Pulmonary Insufficiency Acute Post op Insufficiency Other, please specify Clinically unable to determine The medical record reflects the following: 
  Risk Factors: CAD Clinical Indicators:  
7/8-nurses note 1525 - still having 4-5 second periods of apnea every couple minutes then arouses independently. ABG drawn: 
 
7/9-progress note Anticipated post op resp insufficiency: wean NC as tolerated. IS, coughing, deep breathing. Treatment: ABG's, FIO2 80-50, supplemental oxygen @ 4 lts/min Thank you, 
       Jose Francisco Ramirez [address] [facility], [address] [facility]

## 2019-07-11 NOTE — CARDIO/PULMONARY
Cardiac Rehab: CABG education folder at bedside. Met with Jaimie Spencer to review cardiac surgery post discharge instructions and to discuss participation in the Cardiac Rehab Program.  
 
Educated using teach back method. Reviewed the use of bear for sternal support, daily weight and temperature monitoring, showering restrictions, signs and symptoms of infection at surgery sites, daily walking and arm exercises, and use of incentive spirometer. Reviewed risk factors for CAD to include the following: HTN, former smoker. Red reminder bracelet placed on left wrist.  Discussed purpose of bracelet, duration of wear, and when to call surgeons office. Discussed Cardiac Rehab Program format, benefits, and encouraged participation. He lives closer to [facility] therefore [facility] Cardiac Rehab contact information is on the AVS. General questions answered. Jaimie Spencer verbalized understanding. Will continue to follow.  Rizwan Abbasi RN

## 2019-07-11 NOTE — PROGRESS NOTES
Cardiac Surgery Care Coordinator- Met with Shawn Pinzon reviewed plan of care and discussed upcoming discharge date. Reinforced sternal precautions and encouraged continued use of the incentive spirometer. Began discharge teaching and encouraged him to verbalize. Reviewed goals for the day and discussed the  importance of increased activity and continued activity after discharge. Will continue to follow for educational and emotional needs.  Kemi Nath RN

## 2019-07-11 NOTE — PROGRESS NOTES
CM spoke with Cardiac Surgery PA- anticipate discharge home today. Patient has been established with [facility] for skilled nursing and PT. CM called Frank with [facility], aware of discharge today- information on AVS. The CM provided the patient with extensive resources for private duty services, delivered meals to the home, and [facility] to inquire about possible volunteers in the area to assist with dog walking. Patient endorsed his ex-wife [employer] will assist with transport home. CM will discuss further with patient. DEBRA Bryant    The CM met with patient at bedside, denied having any concerns for transition home today. The patient's ex-wife [employer] will transport home today. The patient requested assistance with obtaining a [facility] PCP- patient does not have regular PCP, only goes to [facility]- CM sent request to [facility] to assist with obtaining new PCP for a new visit in approximately one month, Cardiac Surgery will do the initial follow-up. DEBRA Bryant    14:02 p.m.- CM spoke with PA, patient will be staying today due to BP- CM called and left voicemail message for Frank with [facility] to provide update.

## 2019-07-11 NOTE — PROGRESS NOTES
Spiritual Care Assessment/Progress Note  [facility]      NAME: Tameka Lopez      MRN: [id number]  AGE: 58 y.o.  SEX: male  Sikhism Affiliation: No preference   Language: English     7/11/2019     Total Time (in minutes): 12     Spiritual Assessment begun in [facility] CV SERVICES UNIT through conversation with:         [x]Patient        [] Family    [] Friend(s)        Reason for Consult: Initial/Spiritual assessment, patient floor, Initial visit     Spiritual beliefs: (Please include comment if needed)     [] Identifies with a michael tradition:         [] Supported by a michael community:            [] Claims no spiritual orientation:           [] Seeking spiritual identity:                [x] Adheres to an individual form of spirituality:           [] Not able to assess:                           Identified resources for coping:      [x] Prayer                               [] Music                  [] Guided Imagery     [x] Family/friends                 [] Pet visits     [] Devotional reading                         [] Unknown     [] Other:                                               Interventions offered during this visit: (See comments for more details)    Patient Interventions: Affirmation of michael, Affirmation of emotions/emotional suffering, Initial/Spiritual assessment, patient floor, Initial visit, Normalization of emotional/spiritual concerns, Prayer (assurance of)           Plan of Care:     [x] Support spiritual and/or cultural needs    [] Support AMD and/or advance care planning process      [] Support grieving process   [] Coordinate Rites and/or Rituals    [] Coordination with community clergy   [] No spiritual needs identified at this time   [] Detailed Plan of Care below (See Comments)  [] Make referral to Music Therapy  [] Make referral to Pet Therapy     [] Make referral to Addiction services  [] Make referral to [facility]  [] Make referral to Spiritual Care Partner  [] No future visits requested        [x] Follow up visits as needed     Comments:  made initial visit to patients room for spiritual assessment in [facility].  was sitting on side of bed during visit. Patient is hoping to be discharged today if everything works out for him. Patient is more than ready to go home. Patient was complaining about the food as it is not what he was used to eating at home.  asked him to tell that to them so they would know and he said he had told them. Patient does not have much support in the area as he only has his ex wife for support and that worries him some. He is ready to get home to his dogs.  provided pastoral support to patient during visit. Spiritual Care will follow up as needed.     Federico Strickland MDiv  Pager: [phone]

## 2019-07-11 NOTE — PROGRESS NOTES
Occupational Therapy    Chart reviewed, consulted with RN, attempted OT treatment session. Patient declined need for additional OT services despite encouragement. Noted patient has demonstrated excellent functional progress and understanding of ADL modifications/ sternal precautions/ UE cardiac exercises. Main concern in OT yesterday was hypotension. Recommend d/c home when medically stable. Will complete OT order.     yLnn Eden, OTR/L

## 2019-07-11 NOTE — PROGRESS NOTES
1940: Bedside shift change report given to Ann (oncoming nurse) by Melissa Lee (offgoing nurse). Report included the following information SBAR, Intake/Output, MAR, Accordion, Recent Results, Med Rec Status and Cardiac Rhythm NSR, BBB.    0600: Patient declines CHG bath and linen change, states he wants to bathe when he gets home and putting on a clean gown is \"silly\" since he already has his clothes out and ready to wear home     0750: Bedside shift change report given to Ravi Flores (oncoming nurse) by Giovanni Tavarez (offgoing nurse). Report included the following information SBAR, Intake/Output, MAR, Accordion, Recent Results, Med Rec Status and Cardiac Rhythm NSR, BBB.

## 2019-07-12 VITALS
DIASTOLIC BLOOD PRESSURE: 87 MMHG | OXYGEN SATURATION: 96 % | BODY MASS INDEX: 25.83 KG/M2 | HEART RATE: 82 BPM | TEMPERATURE: 98.3 F | SYSTOLIC BLOOD PRESSURE: 152 MMHG | RESPIRATION RATE: 18 BRPM | WEIGHT: 190.48 LBS

## 2019-07-12 LAB
ANION GAP SERPL CALC-SCNC: 6 MMOL/L (ref 5–15)
BUN SERPL-MCNC: 13 MG/DL (ref 6–20)
BUN/CREAT SERPL: 15 (ref 12–20)
CALCIUM SERPL-MCNC: 8.6 MG/DL (ref 8.5–10.1)
CHLORIDE SERPL-SCNC: 108 MMOL/L (ref 97–108)
CO2 SERPL-SCNC: 25 MMOL/L (ref 21–32)
CREAT SERPL-MCNC: 0.87 MG/DL (ref 0.7–1.3)
ERYTHROCYTE [DISTWIDTH] IN BLOOD BY AUTOMATED COUNT: 15.9 % (ref 11.5–14.5)
GLUCOSE SERPL-MCNC: 105 MG/DL (ref 65–100)
HCT VFR BLD AUTO: 34.5 % (ref 36.6–50.3)
HGB BLD-MCNC: 10.9 G/DL (ref 12.1–17)
MAGNESIUM SERPL-MCNC: 2.2 MG/DL (ref 1.6–2.4)
MCH RBC QN AUTO: 25.2 PG (ref 26–34)
MCHC RBC AUTO-ENTMCNC: 31.6 G/DL (ref 30–36.5)
MCV RBC AUTO: 79.9 FL (ref 80–99)
NRBC # BLD: 0 K/UL (ref 0–0.01)
NRBC BLD-RTO: 0 PER 100 WBC
PLATELET # BLD AUTO: 240 K/UL (ref 150–400)
PMV BLD AUTO: 11.1 FL (ref 8.9–12.9)
POTASSIUM SERPL-SCNC: 3.6 MMOL/L (ref 3.5–5.1)
RBC # BLD AUTO: 4.32 M/UL (ref 4.1–5.7)
SODIUM SERPL-SCNC: 139 MMOL/L (ref 136–145)
WBC # BLD AUTO: 13.1 K/UL (ref 4.1–11.1)

## 2019-07-12 PROCEDURE — 83735 ASSAY OF MAGNESIUM: CPT

## 2019-07-12 PROCEDURE — 74011250637 HC RX REV CODE- 250/637: Performed by: PHYSICIAN ASSISTANT

## 2019-07-12 PROCEDURE — 85027 COMPLETE CBC AUTOMATED: CPT

## 2019-07-12 PROCEDURE — 36415 COLL VENOUS BLD VENIPUNCTURE: CPT

## 2019-07-12 PROCEDURE — 80048 BASIC METABOLIC PNL TOTAL CA: CPT

## 2019-07-12 RX ORDER — GUAIFENESIN 100 MG/5ML
81 LIQUID (ML) ORAL DAILY
Qty: 30 TAB | Refills: 2 | Status: SHIPPED | OUTPATIENT
Start: 2019-07-13 | End: 2019-07-12

## 2019-07-12 RX ORDER — LANOLIN ALCOHOL/MO/W.PET/CERES
325 CREAM (GRAM) TOPICAL
Qty: 30 TAB | Refills: 2 | Status: SHIPPED | OUTPATIENT
Start: 2019-07-13

## 2019-07-12 RX ORDER — OXYCODONE AND ACETAMINOPHEN 5; 325 MG/1; MG/1
1 TABLET ORAL
Qty: 40 TAB | Refills: 0 | Status: SHIPPED | OUTPATIENT
Start: 2019-07-12 | End: 2019-07-15

## 2019-07-12 RX ORDER — ATORVASTATIN CALCIUM 80 MG/1
80 TABLET, FILM COATED ORAL DAILY
Qty: 30 TAB | Refills: 2 | Status: SHIPPED | OUTPATIENT
Start: 2019-07-13 | End: 2019-07-12

## 2019-07-12 RX ORDER — LANOLIN ALCOHOL/MO/W.PET/CERES
325 CREAM (GRAM) TOPICAL
Qty: 30 TAB | Refills: 2 | Status: SHIPPED | OUTPATIENT
Start: 2019-07-13 | End: 2019-07-12

## 2019-07-12 RX ORDER — AMOXICILLIN 250 MG
1 CAPSULE ORAL
Qty: 30 TAB | Refills: 1 | Status: SHIPPED | OUTPATIENT
Start: 2019-07-12 | End: 2019-07-12

## 2019-07-12 RX ORDER — AMOXICILLIN 250 MG
1 CAPSULE ORAL
Qty: 30 TAB | Refills: 1 | Status: SHIPPED | OUTPATIENT
Start: 2019-07-12 | End: 2019-08-13

## 2019-07-12 RX ORDER — AMIODARONE HYDROCHLORIDE 200 MG/1
TABLET ORAL
Qty: 84 TAB | Refills: 0 | Status: SHIPPED | OUTPATIENT
Start: 2019-07-12 | End: 2019-07-12

## 2019-07-12 RX ORDER — VITAMIN E 1000 UNIT
1000 CAPSULE ORAL
Qty: 30 TAB | Refills: 0 | Status: SHIPPED | OUTPATIENT
Start: 2019-07-13 | End: 2019-07-12

## 2019-07-12 RX ORDER — GUAIFENESIN 100 MG/5ML
81 LIQUID (ML) ORAL DAILY
Qty: 30 TAB | Refills: 2 | Status: SHIPPED | OUTPATIENT
Start: 2019-07-13

## 2019-07-12 RX ORDER — OXYCODONE AND ACETAMINOPHEN 5; 325 MG/1; MG/1
1 TABLET ORAL
Qty: 40 TAB | Refills: 0 | Status: SHIPPED | OUTPATIENT
Start: 2019-07-12 | End: 2019-07-12

## 2019-07-12 RX ORDER — VITAMIN E 1000 UNIT
1000 CAPSULE ORAL
Qty: 30 TAB | Refills: 0 | Status: SHIPPED | OUTPATIENT
Start: 2019-07-13

## 2019-07-12 RX ORDER — AMIODARONE HYDROCHLORIDE 200 MG/1
TABLET ORAL
Qty: 84 TAB | Refills: 0 | Status: SHIPPED | OUTPATIENT
Start: 2019-07-12 | End: 2019-08-13

## 2019-07-12 RX ORDER — ATORVASTATIN CALCIUM 80 MG/1
80 TABLET, FILM COATED ORAL DAILY
Qty: 30 TAB | Refills: 2 | Status: SHIPPED | OUTPATIENT
Start: 2019-07-13 | End: 2019-11-18 | Stop reason: SDUPTHER

## 2019-07-12 RX ADMIN — ATORVASTATIN CALCIUM 80 MG: 40 TABLET, FILM COATED ORAL at 08:22

## 2019-07-12 RX ADMIN — ASPIRIN 81 MG 81 MG: 81 TABLET ORAL at 08:23

## 2019-07-12 RX ADMIN — POLYETHYLENE GLYCOL 3350 17 G: 17 POWDER, FOR SOLUTION ORAL at 08:22

## 2019-07-12 RX ADMIN — FAMOTIDINE 20 MG: 20 TABLET ORAL at 08:23

## 2019-07-12 RX ADMIN — MUPIROCIN: 20 OINTMENT TOPICAL at 08:23

## 2019-07-12 RX ADMIN — CHLORHEXIDINE GLUCONATE 10 ML: 1.2 RINSE ORAL at 09:00

## 2019-07-12 RX ADMIN — MAGNESIUM OXIDE TAB 400 MG (241.3 MG ELEMENTAL MG) 400 MG: 400 (241.3 MG) TAB at 08:22

## 2019-07-12 RX ADMIN — OXYCODONE HYDROCHLORIDE AND ACETAMINOPHEN 1000 MG: 500 TABLET ORAL at 08:22

## 2019-07-12 RX ADMIN — FERROUS SULFATE TAB 325 MG (65 MG ELEMENTAL FE) 325 MG: 325 (65 FE) TAB at 08:22

## 2019-07-12 RX ADMIN — AMIODARONE HYDROCHLORIDE 400 MG: 200 TABLET ORAL at 08:22

## 2019-07-12 RX ADMIN — Medication 20 ML: at 04:26

## 2019-07-12 RX ADMIN — SENNOSIDES, DOCUSATE SODIUM 1 TABLET: 50; 8.6 TABLET, FILM COATED ORAL at 08:22

## 2019-07-12 RX ADMIN — OXYCODONE HYDROCHLORIDE AND ACETAMINOPHEN 1 TABLET: 5; 325 TABLET ORAL at 04:26

## 2019-07-12 NOTE — CARDIO/PULMONARY
Cardiac Rehab: Met with Lamine Gomez. He is dressed and ready for discharge. Discussed plan of care from here on out. Answered a number of questions about discharge process, home health and cardiac rehab, and medication coordination. He is eager for discharge and has no additional questions, at this time.  Gabriel Wynn RN

## 2019-07-12 NOTE — PROGRESS NOTES
Patient is to discharge home today with home health skilled nursing- will need [facility] PT order added. CM called and spoke with [address] with [facility]- aware of discharge today. The CM met with patient at bedside, patient's ex-wife Marlen will be here around 12:00 p.m. Today to transport home. No other needs or concerns identified at this time.  The patient has PCP appointment established for new PCP, has resources at bedside for Sneha Rodríguez to inquire about volunteer services for dog walking, delivered meals programs, private duty to assist with housekeeping, etc. Robert Client, DEBRA

## 2019-07-12 NOTE — PROGRESS NOTES
Cardiac Surgery Care Coordinator-  Met with Jose Eduardo Serna, reviewed plan of care and discharge instructions. Reinforced 5 lb weight restriction, sternal precautions and continued use of the incentive spirometer. Reviewed the importance of daily temp and weight monitoring, discussed incisional care and reviewed signs and symptoms of infection. Red reminder bracelet on right wrist, reviewed purpose of the bracelet and when to call the MD. Using the teach back method reviewed new medications, Jose Eduardo Serna is able to state the name, purpose and possible side effects of amiodarone, ferrous sulfate, vit. C, percocet, pericolace and atorvastatin. He requests that his medication be called in to [facility]. Notified bedside nurse. Reminded pt of appts and encouraged participation in the Cardiac Wellness and rehab program after discharge. Encouraged Jose Eduardo Serna to verbalize and emotional support given. Jose Eduardo Serna is without questions or concerns at this time.  Jaxon Abreu RN

## 2019-07-12 NOTE — PROGRESS NOTES
[facility]  Progress Note    Admit Date: 2019  POD:  4 Day Post-Op    Procedure:  Procedure(s):  CABG X 3 WITH CPB; DONOR SITES: LIMA AND LEFT SAPHENOUS VEIN VIA EVH; BERTRAND AND EPIAORTIC BY DR. WOLF        Subjective:   Pt seen with Dr. Caroline Larios. Looks great, wants to go home. Ambulating without difficulties. Bm last night and this morning. Objective:   Vitals:  Blood pressure 152/87, pulse 82, temperature 98.3 °F (36.8 °C), resp. rate 18, weight 190 lb 7.6 oz (86.4 kg), SpO2 96 %. Temp (24hrs), Av.5 °F (36.9 °C), Min:98.3 °F (36.8 °C), Max:98.6 °F (37 °C)    Hemodynamics:   CO: CO (l/min): 5 l/min   CI: CI (l/min/m2): 2.7 l/min/m2   CVP: CVP (mmHg): 5 mmHg (19 1000)   SVR: SVR (dyne*sec)/cm5: 988 (dyne*sec)/cm5 (19 9512)   PAP Systolic: PAP Systolic: 21 (82/02/79 5306)   PAP Diastolic: PAP Diastolic: 8 (66/32/37 9994)   PVR:     SV02: SVO2 (%): 54 % (19 1000)   SCV02:      EKG/Rhythm:  SR    Oxygen Therapy:  Oxygen Therapy  O2 Sat (%): 96 % (19 0835)  Pulse via Oximetry: 66 beats per minute (19 1000)  O2 Device: Room air (19 0835)  O2 Flow Rate (L/min): 1 l/min (19 0900)  FIO2 (%): 50 % (19 1430)    CXR:  CXR Results  (Last 48 hours)               19 1423  XR CHEST PA LAT Final result    Impression:  IMPRESSION: Small-to-moderate left pleural effusion. Narrative:  EXAM:  XR CHEST PA LAT       INDICATION:  Postop heart. Status post chest tube removal.       COMPARISON: July 10, 2019. TECHNIQUE: PA and lateral chest radiographs       FINDINGS: Interval removal of left chest tube. Small-to-moderate left pleural   effusion. Heart size and mediastinal contours are unchanged. No pulmonary edema. Right lung base opacity seen on the prior radiograph has resolved. No   significant right pleural effusion. Lungs are clear.                  Admission Weight: Last Weight   Weight: 185 lb 6.5 oz (84.1 kg) Weight: 190 lb 7.6 oz (86.4 kg)     Intake / Output / Drain:  Current Shift: No intake/output data recorded. Last 24 hrs.:     Intake/Output Summary (Last 24 hours) at 2019 0934  Last data filed at 2019 0410  Gross per 24 hour   Intake 780 ml   Output    Net 780 ml       EXAM:  General:   NAD                                                                                           Lungs:   Clear to auscultation bilaterally. Incision:  CDI   Heart:  Regular rate and rhythm, S1, S2 normal, no murmur, click, rub or gallop. Abdomen:   Soft, non-tender. Bowel sounds normal. No masses,  No organomegaly. Extremities:  No edema. PPP. Neurologic:  Gross motor and sensory apparatus intact. Labs:   Recent Labs     19  0411 19  0728  07/10/19  0341   WBC 13.1*  --    < > 13.6*   HGB 10.9*  --    < > 10.1*   HCT 34.5*  --    < > 32.4*     --    < > 162     --    < > 137   K 3.6  --    < > 3.8   BUN 13  --    < > 22*   CREA 0.87  --    < > 1.19   *  --    < > 82   GLUCPOC  --  124*   < >  --    INR  --   --   --  1.3*    < > = values in this interval not displayed. Assessment:     Active Problems:    CAD (coronary artery disease) (2019)      S/P CABG x 3 (2019)         Plan/Recommendations/Medical Decision Makin. CAD/NSTEMI s/p CABG: cont ASA/Statin, BB stopped due to orthostatic hypotension. 2. HTN: BB stopped due to above. May restart outpatient if needed. 3. GERD: pepcid for SUP, tums prn  4. Chronic ulcerative colitis: controlled w/ diet.  cont mesalamine. 5. Bradycardia: Cont PO per Fiser. Hold BB for now. 6. Anticipated acute blood loss anemia: On Fe/Vit C. Monitor HH  7. Thrombocytopenia: resolved  8. Leukocytosis: mild. Likely related to post op state. Mobilize. 9. Atelectasis: IS, coughing, deep breathing. 10. Constipation: miralax pericolace this am. Resolved. Dispo: PT/OT.  Home today      Signed By: GHULAM Merritt

## 2019-07-12 NOTE — DISCHARGE INSTRUCTIONS
[facility]    [address] [place] [address] [address] [address]  Office- [phone]  Fax- [phone]         Office- [phone]  Fax- [phone]  _____________________________________________________________________    Dr. Edilia Perales [facility]   Thong Ding [facility] Center[facility] Stewart PA-C, Gerlene Peter                                                Name:Vale Sidhu     Surgery & Date: Procedure(s):  CABG X 3 WITH CPB; DONOR SITES: LIMA AND LEFT SAPHENOUS VEIN VIA EVH; BERTRAND AND EPIAORTIC BY  [facility]    Discharge Date: No discharge date for patient encounter. MEDICATIONS:  Please refer to your After Visit Summary for your medication list. If you do not have a prescription for a new medication, you may purchase the medication over the counter. DO NOT TAKE ANY MEDICATIONS THAT ARE NOT ON THIS LIST    INSTRUCTIONS:  1. NO SMOKING OR TOBACCO PRODUCTS  2. Follow all the instructions in your discharge book  3. You may shower. Wash all incisions twice daily with mild soap and water. No lotions, ointments or powder. 4. Call the office immediately for any redness, swelling, or drainage from your incision. 5. Take your temperature daily and call for a temperature of 101 degrees or higher or for any symptoms that make you think you have and infection. 6. Weigh yourself each morning. Call if you gain more than 5 pounds in 48 hours. 7. Use the incentive spirometer 6-8 times a day-10 breaths each time. Use a pillow or your bear to splint your breastbone when coughing or sneezing.   8. If you feel your breast bone clicking or popping, notify the office immediately. 9. Walk several hundred feet several times daily. DIET  Eat an American Heart Association diet. If you are having trouble with your appetite, eat what you can. Try eating small, frequent meals throughout the day. ACTIVITY  1. NO DRIVING--you will be evaluated to drive at your follow up visit. 2. Increase your activity by walking several times a day. Stay out of bed most of the day. 3. When sitting, keep your legs elevated. 4. You may ride in a car, but you must get out every hour and walk around. If you ride in a car with an airbag that can not be switched off, put the seat ALL the way back or ride in the back seat. 5. NO LIFTING MORE THAN 5 POUND FOR 1 MONTH, THEN YOU CAN INCREASE TO NO MORE THAN 10 LBS FOR THE 2nd MONTH AND NO MORE THAN 15 LBS FOR THE 3rd MONTH.  YOU WILL NO LONGER HAVE ANY LIFTING RESTRICTIONS AFTER 3 MONTHS. FOLLOW UP  1. Your first follow up appointment will be on 7/16/19 at 11:30 am. Our office is located in [address] [place] [address]. Your second follow up appointment will be in four weeks, on 8/13/19 at 11:15 am. Please call our office at [phone] if you are unable to make either one of these appointments. 2. You will be receiving a call before your 5 day appointment to begin cardiac rehab. They are located in the [address] [place] [address] on [facility]. Their phone number is [phone]. Please call if you have not been contacted 2-3 weeks after discharge from the hospital.  3. We will make an appointment with your cardiologist at your last appointment. 4. Consult you primary care physician regarding your influenza &   pneumovax vaccines. 5.   Please bring all medications with you to your appointment.     Signature:___________________________________________________

## 2019-07-13 ENCOUNTER — HOME CARE VISIT (OUTPATIENT)
Dept: SCHEDULING | Facility: HOME HEALTH | Age: 64
End: 2019-07-13
Payer: COMMERCIAL

## 2019-07-13 PROCEDURE — G0299 HHS/HOSPICE OF RN EA 15 MIN: HCPCS

## 2019-07-13 NOTE — PROGRESS NOTES
5 Educated patient on discharge, medication, and follow up information. Time given for questions. IV and telemetry removed. Patient and family packed room.

## 2019-07-15 ENCOUNTER — HOME CARE VISIT (OUTPATIENT)
Dept: SCHEDULING | Facility: HOME HEALTH | Age: 64
End: 2019-07-15
Payer: COMMERCIAL

## 2019-07-15 ENCOUNTER — TELEPHONE (OUTPATIENT)
Dept: CASE MANAGEMENT | Age: 64
End: 2019-07-15

## 2019-07-15 PROCEDURE — G0151 HHCP-SERV OF PT,EA 15 MIN: HCPCS

## 2019-07-15 NOTE — TELEPHONE ENCOUNTER
Cardiac Surgery Discharge - Follow up call placed to [facility]. Reviewed plan of care after discharge and encouraged [facility] to verbalize. Discussed precautions and reviewed medications, patient without questions regarding medications. Encouraged continued use of the incentive spirometer. Confirmed follow up appts, rescheduled his appt at this request.  Reinforced importance of wearing red reminder bracelet. [facility] is without questions or concerns.  Maryann Allen RN

## 2019-07-16 VITALS
RESPIRATION RATE: 16 BRPM | SYSTOLIC BLOOD PRESSURE: 130 MMHG | OXYGEN SATURATION: 96 % | HEART RATE: 93 BPM | TEMPERATURE: 98.2 F | DIASTOLIC BLOOD PRESSURE: 80 MMHG

## 2019-07-16 VITALS
HEART RATE: 100 BPM | TEMPERATURE: 97.8 F | SYSTOLIC BLOOD PRESSURE: 120 MMHG | OXYGEN SATURATION: 96 % | DIASTOLIC BLOOD PRESSURE: 70 MMHG | RESPIRATION RATE: 18 BRPM

## 2019-07-17 ENCOUNTER — HOME CARE VISIT (OUTPATIENT)
Dept: SCHEDULING | Facility: HOME HEALTH | Age: 64
End: 2019-07-17
Payer: COMMERCIAL

## 2019-07-18 ENCOUNTER — OFFICE VISIT (OUTPATIENT)
Dept: CARDIOLOGY CLINIC | Age: 64
End: 2019-07-18

## 2019-07-18 VITALS
OXYGEN SATURATION: 95 % | SYSTOLIC BLOOD PRESSURE: 126 MMHG | DIASTOLIC BLOOD PRESSURE: 84 MMHG | BODY MASS INDEX: 24.95 KG/M2 | WEIGHT: 184 LBS | RESPIRATION RATE: 14 BRPM | HEART RATE: 85 BPM | TEMPERATURE: 97.9 F

## 2019-07-18 DIAGNOSIS — Z95.1 S/P CABG X 3: Primary | ICD-10-CM

## 2019-07-18 RX ORDER — CEPHALEXIN 250 MG/1
500 CAPSULE ORAL 4 TIMES DAILY
Qty: 40 CAP | Refills: 0 | Status: SHIPPED | OUTPATIENT
Start: 2019-07-18 | End: 2019-07-23

## 2019-07-18 NOTE — PROGRESS NOTES
Patient: Yasmine Cruz. Age: 58 y.o. Patient Care Team:  None as PCP - Geno Holland MD (Cardiology)    Diagnosis: The encounter diagnosis was S/P CABG x 3. Problem List:   Patient Active Problem List   Diagnosis Code    Chest pain R07.9    GERD (gastroesophageal reflux disease) K21.9    Ulcerative colitis, chronic (HCC) K51.90    Elevated blood pressure reading R03.0    NSTEMI (non-ST elevated myocardial infarction) ([facility] [address] I21.4    CAD (coronary artery disease) I25.10    S/P CABG x 3 Z95.1        Date of Surgery: 7/8/19     Surgery: CABGx3    HPI: The patient is here for his 5 day fu appointment. He is ambulating well without assistance. He denies SOB, weight gain, and edema. He has a good appetite. He denies constipation. He is complaining about drainage from his sternal incision. Current Medications:   Current Outpatient Medications   Medication Sig Dispense Refill    aspirin 81 mg chewable tablet Take 1 Tab by mouth daily. 30 Tab 2    amiodarone (CORDARONE) 200 mg tablet Take 2 tabs by mouth twice daily for 2 weeks. Then take 2 tabs by mouth once daily for two weeks and then STOP. 84 Tab 0    ascorbic acid, vitamin C, (VITAMIN C) 1,000 mg tablet Take 1 Tab by mouth daily (with breakfast). 30 Tab 0    atorvastatin (LIPITOR) 80 mg tablet Take 1 Tab by mouth daily. 30 Tab 2    ferrous sulfate 325 mg (65 mg iron) tablet Take 1 Tab by mouth daily (with breakfast). 30 Tab 2    mesalamine DR (ASACOL HD) 800 mg DR tablet Take 800 mg by mouth daily. 1    cephALEXin (KEFLEX) 250 mg capsule Take 2 Caps by mouth four (4) times daily for 5 days. 40 Cap 0    senna-docusate (PERICOLACE) 8.6-50 mg per tablet Take 1 Tab by mouth daily as needed for Constipation. 30 Tab 1    omeprazole (PRILOSEC) 40 mg capsule Take 40 mg by mouth daily. Vitals: Blood pressure 126/84, pulse 85, temperature 97.9 °F (36.6 °C), resp. rate 14, weight 184 lb (83.5 kg), SpO2 95 %. Allergies: has No Known Allergies. Physical Exam:  Wounds: moderate amount of serous drainage from incision. Incision is well approximated. Slight redness around the incision. Lungs: clear to auscultation bilaterally    Heart: regular rate and rhythm, S1, S2 normal, no murmur, click, rub or gallop    Extremities: normal strength, tone, and muscle mass    Assessment/Plan:   1. CAD/NSTEMI s/p CABG: cont ASA/Statin, BB stopped due to orthostatic hypotension. 2. HTN: BB stopped due to above. May restart outpatient if needed. BP ok in the office, cont to monitor. 3. GERD: takes Prilosec as needed. 4. Chronic ulcerative colitis: controlled w/ diet.  cont mesalamine. 5. Bradycardia: HR 80s in the office today. 6. Anticipated acute blood loss anemia: On Fe/Vit C.   7. Thrombocytopenia: resolved  8. Leukocytosis: mild. Likely related to post op state. Starting antibiotics for sternal drainage. 9. Atelectasis: IS, coughing, deep breathing. 10. Constipation: resolved. 11. Sternal drainage: Start keflex x5 days, RTC in 1 week for wound check. 12. Dispo: RTC in 1 week for wound check. Pt is ready to start cardiac rehab.      Rehab - y  Walking: y  Glucometer: n/a  Antibiotic card for valves: n/a

## 2019-07-19 ENCOUNTER — TELEPHONE (OUTPATIENT)
Dept: CARDIAC REHAB | Age: 64
End: 2019-07-19

## 2019-07-19 ENCOUNTER — HOME CARE VISIT (OUTPATIENT)
Dept: SCHEDULING | Facility: HOME HEALTH | Age: 64
End: 2019-07-19
Payer: COMMERCIAL

## 2019-07-19 PROCEDURE — G0300 HHS/HOSPICE OF LPN EA 15 MIN: HCPCS

## 2019-07-19 NOTE — TELEPHONE ENCOUNTER
7/19/2019 Cardiac Rehab at [facility]: Contacted Mr. Petra Fonseca. to discuss participation in the Cardiac Rehab Program.  Left voicemail message asking for a return call. If there is no return call, follow up is planned for 7/24/2019.  Sarahy Mcclellan

## 2019-07-22 ENCOUNTER — HOME CARE VISIT (OUTPATIENT)
Dept: SCHEDULING | Facility: HOME HEALTH | Age: 64
End: 2019-07-22
Payer: COMMERCIAL

## 2019-07-22 VITALS
DIASTOLIC BLOOD PRESSURE: 78 MMHG | OXYGEN SATURATION: 98 % | SYSTOLIC BLOOD PRESSURE: 120 MMHG | TEMPERATURE: 98.2 F | HEART RATE: 76 BPM | RESPIRATION RATE: 18 BRPM

## 2019-07-22 PROCEDURE — G0300 HHS/HOSPICE OF LPN EA 15 MIN: HCPCS

## 2019-07-22 NOTE — PROCEDURES
[address]  PULMONARY FUNCTION TEST    Name:  Kelsie Prieto  MR#:  [id number]  :  1955  ACCOUNT #:  [de-identified]  DATE OF SERVICE:  2019      REFERRING PHYSICIAN:  Mario Riggs MD    INDICATION:  Unclear, aborted. Vital capacity is 94% predicted at 4.7 L; FEV1 is 3.61 L, 97% predicted; FEV1/FVC ratio is normal at 77%. Flow volume loop suggests mild attenuation with inspiratory limb. IMPRESSION:  Overall normal pulmonary mechanics. Clinical correlation advised.       MD CORINE [email or web address]/  D:  2019 13:17  T:  2019 16:17  JOB #:  [id number]

## 2019-07-22 NOTE — DISCHARGE SUMMARY
Transfer summary      Physician Transfer Summary     Patient ID:  Micheal Crenshaw  [id number]  58 y.o.  1955    Admit date: 7/3/2019        Admission Diagnoses: Chest pain [R07.9]  NSTEMI (non-ST elevated myocardial infarction) [facility]) [I21.4]    Discharge Diagnoses:    Principal Problem:    NSTEMI (non-ST elevated myocardial infarction) ([address] (7/4/2019)    Active Problems:    Chest pain (7/3/2019)      GERD (gastroesophageal reflux disease) ()      Ulcerative colitis, chronic (HCC) ()      Elevated blood pressure reading (7/3/2019)           Hospital Course:       NSTEMI (non-ST elevated myocardial infarction) / CP. Trop spiked overnight and ruled in. Cardiology consult for [facility]. Start heparin gtt protocol. Given ASA + loading plavix in ED. ASA alone for now (in case CABG). Check echo. Start coreg for BP and NSTEMI. FLP with LDL at 90, start statin. LHC: 3v dz  - CT Surgery eval  - transfer to [facility] underway      PCP: None       Condition of patient at discharge: stable    Discharge Exam:  General:   WD, WN. Alert, cooperative, no acute distress    EENT:      EOMI. Anicteric sclerae. MMM  Resp:       CTA bilaterally, no wheezing or rales. No accessory muscle use  CV:           Regular  rhythm,  No edema  GI:            Soft, Non distended, Non tender.  +Bowel sounds  Neurologic: Alert and oriented X 3, normal speech,   Psych:       Good insight. Not anxious nor agitated  Skin:          No rashes. No jaundice    Disposition: [facility]    Patient Instructions:   Cannot display discharge medications since this patient is not currently admitted.     Activity: Activity as tolerated  Diet: Cardiac Diet    Follow-up with None in 1-2weeks  Follow-up tests/labs n/a    Approximate time spent in patient care on day of discharge: Greater than 30mins     Signed:  Claude Hope MD  7/22/2019  9:33 AM

## 2019-07-23 VITALS
OXYGEN SATURATION: 98 % | DIASTOLIC BLOOD PRESSURE: 68 MMHG | RESPIRATION RATE: 18 BRPM | HEART RATE: 78 BPM | TEMPERATURE: 98.8 F | SYSTOLIC BLOOD PRESSURE: 110 MMHG

## 2019-07-24 ENCOUNTER — HOME CARE VISIT (OUTPATIENT)
Dept: SCHEDULING | Facility: HOME HEALTH | Age: 64
End: 2019-07-24
Payer: COMMERCIAL

## 2019-07-24 ENCOUNTER — TELEPHONE (OUTPATIENT)
Dept: CARDIAC REHAB | Age: 64
End: 2019-07-24

## 2019-07-24 NOTE — TELEPHONE ENCOUNTER
7/24/2019 Cardiac Rehab at [facility]:  SECOND CALL TO SET UP CARDIAC REHAB. Mariangel Arteaga. Lizz Stuart. to discuss participation in the Cardiac Rehab Program.  Left voicemail message asking for a return call. If there is no return call, follow up is planned for 8/1/2019.  Ki Sawyer

## 2019-07-24 NOTE — TELEPHONE ENCOUNTER
Cardiac Rehab @ [facility] - 7/24/2019.  Patient returned call and set up appointment for 8/6/2019 @ 1pm.     Thank you for the referral.     Valerie Chacon

## 2019-07-25 ENCOUNTER — HOME CARE VISIT (OUTPATIENT)
Dept: HOME HEALTH SERVICES | Facility: HOME HEALTH | Age: 64
End: 2019-07-25
Payer: COMMERCIAL

## 2019-07-25 ENCOUNTER — OFFICE VISIT (OUTPATIENT)
Dept: CARDIOLOGY CLINIC | Age: 64
End: 2019-07-25

## 2019-07-25 VITALS
RESPIRATION RATE: 14 BRPM | HEART RATE: 72 BPM | OXYGEN SATURATION: 99 % | SYSTOLIC BLOOD PRESSURE: 128 MMHG | DIASTOLIC BLOOD PRESSURE: 62 MMHG | TEMPERATURE: 97.7 F

## 2019-07-25 DIAGNOSIS — Z95.1 S/P CABG X 3: Primary | ICD-10-CM

## 2019-07-25 NOTE — PROGRESS NOTES
Patient: Robert Perry. Age: 58 y.o. Patient Care Team:  None as PCP - Cara France MD (Cardiology)    Diagnosis: The encounter diagnosis was S/P CABG x 3. Problem List:   Patient Active Problem List   Diagnosis Code    Chest pain R07.9    GERD (gastroesophageal reflux disease) K21.9    Ulcerative colitis, chronic (HCC) K51.90    Elevated blood pressure reading R03.0    NSTEMI (non-ST elevated myocardial infarction) ([facility] [address] I21.4    CAD (coronary artery disease) I25.10    S/P CABG x 3 Z95.1        Date of Surgery: 7/8/19      Surgery: CABGx3    HPI: The patient is here for wound check. He reports drainage has stopped. No redness. Denies fever/chills. Current Medications:   Current Outpatient Medications   Medication Sig Dispense Refill    aspirin 81 mg chewable tablet Take 1 Tab by mouth daily. 30 Tab 2    amiodarone (CORDARONE) 200 mg tablet Take 2 tabs by mouth twice daily for 2 weeks. Then take 2 tabs by mouth once daily for two weeks and then STOP. 84 Tab 0    ascorbic acid, vitamin C, (VITAMIN C) 1,000 mg tablet Take 1 Tab by mouth daily (with breakfast). 30 Tab 0    atorvastatin (LIPITOR) 80 mg tablet Take 1 Tab by mouth daily. 30 Tab 2    ferrous sulfate 325 mg (65 mg iron) tablet Take 1 Tab by mouth daily (with breakfast). 30 Tab 2    mesalamine DR (ASACOL HD) 800 mg DR tablet Take 800 mg by mouth daily. 1    senna-docusate (PERICOLACE) 8.6-50 mg per tablet Take 1 Tab by mouth daily as needed for Constipation. 30 Tab 1    omeprazole (PRILOSEC) 40 mg capsule Take 40 mg by mouth daily. Vitals: Blood pressure 128/62, pulse 72, temperature 97.7 °F (36.5 °C), resp. rate 14, SpO2 99 %. Allergies: has No Known Allergies.     Physical Exam:  Wounds: clean, dry, no drainage    Lungs: clear to auscultation bilaterally    Heart: regular rate and rhythm, S1, S2 normal, no murmur, click, rub or gallop    Extremities: normal strength, tone, and muscle mass    Assessment/Plan:   1. CAD/NSTEMI s/p CABG: cont ASA/Statin, BB stopped due to orthostatic hypotension. 2. HTN: BB stopped due to above. May restart outpatient if needed. BP ok in the office, cont to monitor. 3. GERD: takes Prilosec as needed. 4. Chronic ulcerative colitis: controlled w/ diet.  cont mesalamine. 5. Bradycardia: HR 80s in the office today. 6. Anticipated acute blood loss anemia: On Fe/Vit C.   7. Atelectasis: IS, coughing, deep breathing. 8. Constipation: resolved. 9. Sternal drainage: Completed abx,  No drainage, incision well approximated, no redness. 10. Dispo: RTC for 1 month fu appointment. Pt is ready to start cardiac rehab.      Rehab - y  Walking: y  Glucometer: n/a  Antibiotic card for valves: n/a

## 2019-07-27 ENCOUNTER — HOME CARE VISIT (OUTPATIENT)
Dept: HOME HEALTH SERVICES | Facility: HOME HEALTH | Age: 64
End: 2019-07-27
Payer: COMMERCIAL

## 2019-07-27 PROCEDURE — G0299 HHS/HOSPICE OF RN EA 15 MIN: HCPCS

## 2019-07-28 VITALS
WEIGHT: 170 LBS | SYSTOLIC BLOOD PRESSURE: 122 MMHG | HEART RATE: 68 BPM | RESPIRATION RATE: 16 BRPM | DIASTOLIC BLOOD PRESSURE: 78 MMHG | BODY MASS INDEX: 23.06 KG/M2 | TEMPERATURE: 98.2 F | OXYGEN SATURATION: 97 %

## 2019-08-06 ENCOUNTER — HOSPITAL ENCOUNTER (OUTPATIENT)
Dept: CARDIAC REHAB | Age: 64
Discharge: HOME | End: 2019-08-06
Payer: COMMERCIAL

## 2019-08-06 VITALS — HEIGHT: 72 IN | WEIGHT: 183 LBS | BODY MASS INDEX: 24.79 KG/M2

## 2019-08-06 PROCEDURE — 93798 PHYS/QHP OP CAR RHAB W/ECG: CPT

## 2019-08-08 ENCOUNTER — HOSPITAL ENCOUNTER (OUTPATIENT)
Dept: CARDIAC REHAB | Age: 64
Discharge: HOME | End: 2019-08-08
Payer: COMMERCIAL

## 2019-08-08 VITALS — WEIGHT: 184 LBS | BODY MASS INDEX: 24.95 KG/M2

## 2019-08-08 PROCEDURE — 93798 PHYS/QHP OP CAR RHAB W/ECG: CPT

## 2019-08-12 ENCOUNTER — HOSPITAL ENCOUNTER (OUTPATIENT)
Dept: CARDIAC REHAB | Age: 64
Discharge: HOME | End: 2019-08-12
Payer: COMMERCIAL

## 2019-08-12 VITALS — BODY MASS INDEX: 24.82 KG/M2 | WEIGHT: 183 LBS

## 2019-08-12 PROCEDURE — 93798 PHYS/QHP OP CAR RHAB W/ECG: CPT

## 2019-08-13 ENCOUNTER — OFFICE VISIT (OUTPATIENT)
Dept: CARDIOLOGY CLINIC | Age: 64
End: 2019-08-13

## 2019-08-13 VITALS
HEART RATE: 82 BPM | OXYGEN SATURATION: 98 % | BODY MASS INDEX: 23.06 KG/M2 | WEIGHT: 170 LBS | RESPIRATION RATE: 16 BRPM | DIASTOLIC BLOOD PRESSURE: 90 MMHG | SYSTOLIC BLOOD PRESSURE: 140 MMHG

## 2019-08-13 DIAGNOSIS — Z95.1 S/P CABG X 3: Primary | ICD-10-CM

## 2019-08-13 NOTE — PROGRESS NOTES
Patient: Naldo Nascimento. Age: 58 y.o. Patient Care Team:  None as PCP - Yris Cain MD (Cardiology)  Sanna Hernandez MD (Cardiothoracic Surgery)    Diagnosis: The encounter diagnosis was S/P CABG x 3. Problem List:   Patient Active Problem List   Diagnosis Code    Chest pain R07.9    GERD (gastroesophageal reflux disease) K21.9    Ulcerative colitis, chronic (HCC) K51.90    Elevated blood pressure reading R03.0    NSTEMI (non-ST elevated myocardial infarction) ([facility] [address] I21.4    CAD (coronary artery disease) I25.10    S/P CABG x 3 Z95.1        Date of Surgery: 7/8/19      Surgery: CABGx3    HPI: The patient is here for postop follow up. Back in work for about 3 weeks. Some lightheadedness but feels its related to eating irregularly. Some mild soreness to chest.  No SOB, edema. Already started in cardiac rehab. Current Medications:   Current Outpatient Medications   Medication Sig Dispense Refill    aspirin 81 mg chewable tablet Take 1 Tab by mouth daily. 30 Tab 2    ascorbic acid, vitamin C, (VITAMIN C) 1,000 mg tablet Take 1 Tab by mouth daily (with breakfast). 30 Tab 0    atorvastatin (LIPITOR) 80 mg tablet Take 1 Tab by mouth daily. 30 Tab 2    ferrous sulfate 325 mg (65 mg iron) tablet Take 1 Tab by mouth daily (with breakfast). 30 Tab 2    mesalamine DR (ASACOL HD) 800 mg DR tablet Take 800 mg by mouth daily. 1    omeprazole (PRILOSEC) 40 mg capsule Take 40 mg by mouth daily. Vitals: Blood pressure 140/90, pulse 82, resp. rate 16, weight 170 lb (77.1 kg), SpO2 98 %. Allergies: has No Known Allergies. Physical Exam:  Wounds: clean, dry, no drainage    Lungs: clear to auscultation bilaterally    Heart: regular rate and rhythm, S1, S2 normal, no murmur, click, rub or gallop    Extremities: normal strength, tone, and muscle mass    Assessment/Plan:   1. CAD/NSTEMI s/p CABG: cont ASA/Statin, BB stopped due to orthostatic hypotension.   2. HTN: BB stopped due to above. High in office today, but pt reports good control at home. 3. GERD: takes Prilosec as needed. 4. Chronic ulcerative colitis: controlled w/ diet.  cont mesalamine. 5. Bradycardia: HR 80s in the office today. 6. Anticipated acute blood loss anemia: On Fe/Vit C.   9. Sternal drainage: resolved. 10. Dispo: FU with cardiology within 1 month. Pt is ready to start cardiac rehab.      Rehab - y  Walking: y  Glucometer: n/a  Antibiotic card for valves: n/a

## 2019-08-15 ENCOUNTER — HOSPITAL ENCOUNTER (OUTPATIENT)
Dept: CARDIAC REHAB | Age: 64
Discharge: HOME | End: 2019-08-15
Payer: COMMERCIAL

## 2019-08-15 VITALS — BODY MASS INDEX: 24.82 KG/M2 | WEIGHT: 183 LBS

## 2019-08-15 PROCEDURE — 93798 PHYS/QHP OP CAR RHAB W/ECG: CPT

## 2019-08-19 ENCOUNTER — HOSPITAL ENCOUNTER (OUTPATIENT)
Dept: CARDIAC REHAB | Age: 64
Discharge: HOME | End: 2019-08-19
Payer: COMMERCIAL

## 2019-08-19 VITALS — BODY MASS INDEX: 24.95 KG/M2 | WEIGHT: 184 LBS

## 2019-08-19 PROCEDURE — 93798 PHYS/QHP OP CAR RHAB W/ECG: CPT

## 2019-08-22 ENCOUNTER — OFFICE VISIT (OUTPATIENT)
Dept: CARDIOLOGY CLINIC | Age: 64
End: 2019-08-22

## 2019-08-22 VITALS
OXYGEN SATURATION: 94 % | HEIGHT: 72 IN | SYSTOLIC BLOOD PRESSURE: 142 MMHG | RESPIRATION RATE: 16 BRPM | BODY MASS INDEX: 24.49 KG/M2 | HEART RATE: 79 BPM | WEIGHT: 180.8 LBS | DIASTOLIC BLOOD PRESSURE: 88 MMHG

## 2019-08-22 DIAGNOSIS — I10 ESSENTIAL HYPERTENSION: ICD-10-CM

## 2019-08-22 DIAGNOSIS — I25.10 CORONARY ARTERY DISEASE INVOLVING NATIVE CORONARY ARTERY OF NATIVE HEART WITHOUT ANGINA PECTORIS: Primary | ICD-10-CM

## 2019-08-22 DIAGNOSIS — Z95.1 S/P CABG X 3: ICD-10-CM

## 2019-08-22 NOTE — PROGRESS NOTES
Nancy Gomez is a 58 y.o. male    Chief Complaint   Patient presents with    New Patient     Self ref.  Other     Cath 7/5/19       Chest pain Patient states he's having CP from the procedure - CABG x3 7/8/19. SOB no    Dizziness no    Swelling no    Refills no    Visit Vitals  /88 (BP 1 Location: Left arm, BP Patient Position: Sitting)   Pulse 79   Resp 16   Ht 6' (1.829 m)   Wt 180 lb 12.8 oz (82 kg)   SpO2 94%   BMI 24.52 kg/m²       1. Have you been to the ER, urgent care clinic since your last visit? Hospitalized since your last visit? YES [facility]  7/3-7/5 for NSTEMI. And 7/5- 7/12 [facility] for CAD. 2. Have you seen or consulted any other health care providers outside of the [address] [place] [address] since your last visit? Include any pap smears or colon screening.  No

## 2019-08-22 NOTE — PROGRESS NOTES
John Dinh MD    [id number] [facility] [place], [address] [facility]    Office [phone],XVW [phone]  Pager [phone]    Jaylen Whitt. is a 58 y.o. male is here for f/u visit for CAD    Primary care physician:  None    Patient Active Problem List   Diagnosis Code    Chest pain R07.9    GERD (gastroesophageal reflux disease) K21.9    Ulcerative colitis, chronic ([facility] [address] K51.90    Elevated blood pressure reading R03.0    NSTEMI (non-ST elevated myocardial infarction) ([facility] [address] I21.4    CAD (coronary artery disease) I25.10    S/P CABG x 3 Z95.1           I had the pleasure of seeing Mr. Jaylen Jo in the office today. Chief complaint:  Chief Complaint   Patient presents with    New Patient     Self ref.  Other     Cath 7/5/19       Assessment:  CAD s/p CAGB*3 7/5/19  ( NSTEMI)  HTN  GERD  Hx of Ulcerative colitis  \" Passing out\"/sycnope with B Blockers started in hosp prior to DC. B Blockers DC'd. Plan:   Per patient-Home systolic blood pressure is well controlled. Component of whitecoat hypertension. Continue medications. Continue cardiac rehab. Follow-up in 3 months/echocardiogram prior to visit. Aggressive cardiovascular risk factor modification. Patient understands the plan. All questions were answered to the patient's satisfaction. Medication Side Effects and Warnings were discussed with patient: yes  Patient Labs were reviewed and or requested:  yes  Patient Past Records were reviewed and or requested: yes    I appreciate the opportunity to be involved in . See note below for details. Please do not hesitate to contact us with questions or concerns. John Dinh MD    Cardiac Testing/ Procedures: A. Cardiac Cath/PCI: 7/5/19 R radial access     Tortuous R SCV     L Main: Large; Distal 60% at trifurcation     LAD: Med; Prox 60%; haziness - thrombus vs Ca+ ; Small D1/ Small to med D2/D3; L to R collaterals present     LCflex: Med; Nml Mid 60%     RCA:  Med size; prox 70%; Distal 50%; PDA and PLB - MLI     LVEDP: Nml     LVEF: Not assessed/Difficulty getting into LV sec to tortuous R SCV     No significant gradient across aortic valve.           Closure Device: TR band    B.ECHO/BERTRAND: 7/5/19 - Mild LVH; EF 55-60%    C. StressNuclear/Stress ECHO/Stress test:    D.Vascular: 7/5/19 - Carotid doppler - <50% bilat ICA    E. EP:    F. Miscellaneous: 7/8/19 - LIMA to LAD; SVG to OM1; SVG to RCA ( Dr Deneen Gao)    Subjective: Gopal Carrillo is a 58 y.o. male who returns for follow up today. Doing well post surgery. In cardiac rehab. Usually when he goes to the doctor's offices his blood pressure is elevated. Recently on discharge from the hospital,a beta-blocker was started but he passed out because of low blood pressure. It was discontinued. Works for the [employer] for [employer]. Planning to retire after a year. ROS:  (bold if positive, if negative)             Medications before admission:    Current Outpatient Medications   Medication Sig Dispense    aspirin 81 mg chewable tablet Take 1 Tab by mouth daily. 30 Tab    ascorbic acid, vitamin C, (VITAMIN C) 1,000 mg tablet Take 1 Tab by mouth daily (with breakfast). 30 Tab    atorvastatin (LIPITOR) 80 mg tablet Take 1 Tab by mouth daily. 30 Tab    ferrous sulfate 325 mg (65 mg iron) tablet Take 1 Tab by mouth daily (with breakfast). 30 Tab    mesalamine DR (ASACOL HD) 800 mg DR tablet Take 800 mg by mouth daily.  omeprazole (PRILOSEC) 40 mg capsule Take 40 mg by mouth daily as needed. No current facility-administered medications for this visit.         Family History of CAD:    No    Social History:  Current  Smoker  No    Physical Exam:  Visit Vitals  /88 (BP 1 Location: Left arm, BP Patient Position: Sitting)   Pulse 79   Resp 16   Ht 6' (1.829 m)   Wt 180 lb 12.8 oz (82 kg)   SpO2 94%   BMI 24.52 kg/m² Gen: Well-developed, well-nourished, in no acute distress  Neck: Supple,No JVD, No Carotid Bruit,   Resp: No accessory muscle use, Clear breath sounds, No rales or rhonchi  Card: Regular Rate,Rythm,Normal S1, S2, No murmurs, rubs or gallop. No thrills. Abd:  Soft, non-tender, non-distended,BS+,   MSK: No cyanosis  Skin: No rashes    Neuro: moving all four extremities , follows commands appropriately  Psych:  Good insight, oriented to person, place , alert, Nml Affect  LE: No edema    EKG:       LABS:        Lab Results   Component Value Date/Time    WBC 13.1 (H) 07/12/2019 04:11 AM    HGB 10.9 (L) 07/12/2019 04:11 AM    HCT 34.5 (L) 07/12/2019 04:11 AM    PLATELET 584 92/00/4818 04:11 AM     Lab Results   Component Value Date/Time    Sodium 139 07/12/2019 04:11 AM    Potassium 3.6 07/12/2019 04:11 AM    Chloride 108 07/12/2019 04:11 AM    CO2 25 07/12/2019 04:11 AM    Anion gap 6 07/12/2019 04:11 AM    Glucose 105 (H) 07/12/2019 04:11 AM    BUN 13 07/12/2019 04:11 AM    Creatinine 0.87 07/12/2019 04:11 AM    BUN/Creatinine ratio 15 07/12/2019 04:11 AM    GFR est AA >60 07/12/2019 04:11 AM    GFR est non-AA >60 07/12/2019 04:11 AM    Calcium 8.6 07/12/2019 04:11 AM       Lab Results   Component Value Date/Time    aPTT 32.2 (H) 07/10/2019 03:41 AM     Lab Results   Component Value Date/Time    INR 1.3 (H) 07/10/2019 03:41 AM    INR 1.4 (H) 07/08/2019 12:24 PM    INR 1.1 07/05/2019 04:45 PM    Prothrombin time 12.7 (H) 07/10/2019 03:41 AM    Prothrombin time 13.5 (H) 07/08/2019 12:24 PM    Prothrombin time 11.5 (H) 07/05/2019 04:45 PM     No components found for: LAST LIPIDS    ATTENTION:   This medical record was transcribed using an electronic medical records/speech recognition system. Although proofread, it may and can contain electronic, spelling and other errors. Corrections may be executed at a later time. Please feel free to contact us for any clarifications as needed.         Ben Nascimento, MD

## 2019-08-23 ENCOUNTER — HOSPITAL ENCOUNTER (OUTPATIENT)
Dept: CARDIAC REHAB | Age: 64
Discharge: HOME | End: 2019-08-23
Payer: COMMERCIAL

## 2019-08-23 VITALS — BODY MASS INDEX: 24.95 KG/M2 | WEIGHT: 184 LBS

## 2019-08-23 PROCEDURE — 93798 PHYS/QHP OP CAR RHAB W/ECG: CPT

## 2019-08-26 ENCOUNTER — APPOINTMENT (OUTPATIENT)
Dept: CARDIAC REHAB | Age: 64
End: 2019-08-26
Payer: COMMERCIAL

## 2019-08-28 ENCOUNTER — APPOINTMENT (OUTPATIENT)
Dept: CARDIAC REHAB | Age: 64
End: 2019-08-28
Payer: COMMERCIAL

## 2019-08-30 ENCOUNTER — HOSPITAL ENCOUNTER (OUTPATIENT)
Dept: CARDIAC REHAB | Age: 64
Discharge: HOME | End: 2019-08-30
Payer: COMMERCIAL

## 2019-08-30 VITALS — BODY MASS INDEX: 24.82 KG/M2 | WEIGHT: 183 LBS

## 2019-08-30 PROCEDURE — 93798 PHYS/QHP OP CAR RHAB W/ECG: CPT

## 2019-09-04 ENCOUNTER — HOSPITAL ENCOUNTER (OUTPATIENT)
Dept: CARDIAC REHAB | Age: 64
Discharge: HOME | End: 2019-09-04
Payer: COMMERCIAL

## 2019-09-04 VITALS — BODY MASS INDEX: 24.82 KG/M2 | WEIGHT: 183 LBS

## 2019-09-04 PROCEDURE — 93798 PHYS/QHP OP CAR RHAB W/ECG: CPT

## 2019-09-06 ENCOUNTER — HOSPITAL ENCOUNTER (OUTPATIENT)
Dept: CARDIAC REHAB | Age: 64
Discharge: HOME | End: 2019-09-06
Payer: COMMERCIAL

## 2019-09-06 VITALS — WEIGHT: 183 LBS | BODY MASS INDEX: 24.82 KG/M2

## 2019-09-06 PROCEDURE — 93798 PHYS/QHP OP CAR RHAB W/ECG: CPT

## 2019-09-09 ENCOUNTER — HOSPITAL ENCOUNTER (OUTPATIENT)
Dept: CARDIAC REHAB | Age: 64
Discharge: HOME | End: 2019-09-09
Payer: COMMERCIAL

## 2019-09-09 VITALS — WEIGHT: 182 LBS | BODY MASS INDEX: 24.68 KG/M2

## 2019-09-09 PROCEDURE — 93798 PHYS/QHP OP CAR RHAB W/ECG: CPT

## 2019-09-11 ENCOUNTER — APPOINTMENT (OUTPATIENT)
Dept: CARDIAC REHAB | Age: 64
End: 2019-09-11
Payer: COMMERCIAL

## 2019-09-11 ENCOUNTER — HOSPITAL ENCOUNTER (OUTPATIENT)
Dept: CARDIAC REHAB | Age: 64
Discharge: HOME | End: 2019-09-11
Payer: COMMERCIAL

## 2019-09-11 VITALS — WEIGHT: 182 LBS | BODY MASS INDEX: 24.68 KG/M2

## 2019-09-11 PROCEDURE — 93798 PHYS/QHP OP CAR RHAB W/ECG: CPT

## 2019-09-13 ENCOUNTER — HOSPITAL ENCOUNTER (OUTPATIENT)
Dept: CARDIAC REHAB | Age: 64
Discharge: HOME | End: 2019-09-13
Payer: COMMERCIAL

## 2019-09-13 VITALS — WEIGHT: 181 LBS | BODY MASS INDEX: 24.55 KG/M2

## 2019-09-13 PROCEDURE — 93798 PHYS/QHP OP CAR RHAB W/ECG: CPT

## 2019-09-16 ENCOUNTER — HOSPITAL ENCOUNTER (OUTPATIENT)
Dept: CARDIAC REHAB | Age: 64
Discharge: HOME | End: 2019-09-16
Payer: COMMERCIAL

## 2019-09-16 VITALS — WEIGHT: 181 LBS | BODY MASS INDEX: 24.55 KG/M2

## 2019-09-16 PROCEDURE — 93798 PHYS/QHP OP CAR RHAB W/ECG: CPT

## 2019-09-18 ENCOUNTER — HOSPITAL ENCOUNTER (OUTPATIENT)
Dept: CARDIAC REHAB | Age: 64
Discharge: HOME | End: 2019-09-18
Payer: COMMERCIAL

## 2019-09-18 ENCOUNTER — APPOINTMENT (OUTPATIENT)
Dept: CARDIAC REHAB | Age: 64
End: 2019-09-18
Payer: COMMERCIAL

## 2019-09-18 VITALS — BODY MASS INDEX: 24.55 KG/M2 | WEIGHT: 181 LBS

## 2019-09-18 PROCEDURE — 93797 PHYS/QHP OP CAR RHAB WO ECG: CPT | Performed by: DIETITIAN, REGISTERED

## 2019-09-18 PROCEDURE — 93798 PHYS/QHP OP CAR RHAB W/ECG: CPT

## 2019-09-18 NOTE — PROGRESS NOTES
Cardiac Rehab Nutrition Assessment - 1:1 Evaluation           NAME: Gopal King. : 1955 AGE: 58 y.o. GENDER: male  CARDIAC REHAB ADMITTING DIAGNOSIS: CABGx3 and MI    Relevant Comorbidites: hypertension, dyslipidemia, cardiovascular disease and ulcerative colitis (currently in remission)        LABS:   Lab Results   Component Value Date/Time    Hemoglobin A1c 5.9 2019 04:45 PM     Lab Results   Component Value Date/Time    Cholesterol, total 150 2019 04:51 AM    HDL Cholesterol 35 2019 04:51 AM    LDL, calculated 90.4 2019 04:51 AM    VLDL, calculated 24.6 2019 04:51 AM    Triglyceride 123 2019 04:51 AM    CHOL/HDL Ratio 4.3 2019 04:51 AM         MEDICATIONS/SUPPLEMENTS:   [unfilled]  Prior to Admission medications    Medication Sig Start Date End Date Taking? Authorizing Provider   aspirin 81 mg chewable tablet Take 1 Tab by mouth daily. 19   France Kemp PA   ascorbic acid, vitamin C, (VITAMIN C) 1,000 mg tablet Take 1 Tab by mouth daily (with breakfast). 19   France Kemp PA   atorvastatin (LIPITOR) 80 mg tablet Take 1 Tab by mouth daily. 19   France Kemp PA   ferrous sulfate 325 mg (65 mg iron) tablet Take 1 Tab by mouth daily (with breakfast). 19   France Kemp PA   mesalamine DR (ASACOL HD) 800 mg DR tablet Take 800 mg by mouth daily. 19   Provider, Historical   omeprazole (PRILOSEC) 40 mg capsule Take 40 mg by mouth daily as needed.     Other, MD Gifty           ANTHROPOMETRICS:    Ht Readings from Last 1 Encounters:   19 6' (1.829 m)      Wt Readings from Last 1 Encounters:   19 82.1 kg (181 lb)      IBW:178 # +/- 10%  %IBW: 102 % +/- 10%    BMI: 24.5 kg/M2 Category: Normal  Waist: 41.5 inches (measured at intake)    Reported Wt Hx: Lost a little weight in hospital but happy with current weight    Reported Diet Hx:    Rate Your Plate Score: 64  (Score 58-72: Making many healthy choices; 41-57: Some choices need improving 24-40: many choices need improving)    \"Typical Day\"  Breakfast Kashi cinnamon shredded wheat cereal, lactose free fat free milk   Lunch PB&J or chicken / turkey with 2% reduced fat swiss and honey mustard dressing on homemade whole grain bread   Dinner Large spinach salad with almonds, 2% mozzarella cheese, honey mustard dressing   Snacks Cookies, fruit, rice cakes, nuts   Beverages Water, with or without sugar free powdered flavor mix (e.g. Crystal  Light)     Patsy Campo. preferred to report a \"typical day\" rather than a 24-hour recall, stating the last few days have been a [place]" eating out daily due to frequent trips out of town caring for his sick dog. He states eating out is extremely rare. Due to his UC he prepares most of his own food in order to avoid ingredients he has identified that cause him pain / bloody stools / increased stool frequency. He states he always reads labels for salt to stay under 1500 mg / day. Environmental/Social: single, walks 2 miles a day and prior to CABG did weight lifting / strength conditioning as well, enjoys companionship of his dogs and baking bread        NUTRITION INTERVENTION:  Nutrition 60 minute one-on-one education & goal setting with Patsy Campo. Reviewed with Patsy Campo. relevant labs compared to ideals. Reviewed weight history and patient's verbalized weight goal as well as any real or perceived barriers to obtaining the goal. Collaborated with patient to set a specific short and long term weight goal.     Reviewed Rate Your Plate and conducted a verbal diet recall. Assessed for environmental, financial, psychosocial, physical and comorbidities that may impact the food and eating patterns / behaviors of Patsy Campo.     Collaborated with patient to set specific nutrient goals as well as specific food / behavior changes that will help patient meet the overall goal of following a heart healthy eating pattern (using guidelines as set forth by the American Heart Association and modeled after healthful eating patterns as recognized by the USDA Dietary Guidelines such as DASH, Mediterranean or plant-based). Briefly reviewed with Alba Malin. the nutrition information in the Cardiac Rehab patient education book and encouraged Alba Malin. to read thoroughly, ask questions as needed, and use for future reference for heart healthy nutrition information. PATIENT GOALS:    Weight Goals:  Short Term Weight Goal:180-185 lbs  Long Term Weight Goal:180-185 lbs    Nutrition Goals:  Daily Recommendations:  Calories: 2300 /day  (using [place] 1898 with AF 1.4)    Saturated Fat: no more than 15 g/day  Trans Fat: 0 g/day  Sodium: no more than 2291-7291 mg/day  Fruit: 3 cups / day  Vegetables: 3 or more cups/day    Other:  - read and compare food labels  - try oatmeal for some meals for the cholesterol lowering benefits of beta glucan  - eat up to 7 eggs per week for protein   - limit added sugars      Questions addressed. Follow-up plans discussed. Alba Malin. verbalized understanding.             Chula Tiwari RD

## 2019-09-20 ENCOUNTER — APPOINTMENT (OUTPATIENT)
Dept: CARDIAC REHAB | Age: 64
End: 2019-09-20
Payer: COMMERCIAL

## 2019-09-20 ENCOUNTER — HOSPITAL ENCOUNTER (OUTPATIENT)
Dept: CARDIAC REHAB | Age: 64
Discharge: HOME | End: 2019-09-20
Payer: COMMERCIAL

## 2019-09-20 PROCEDURE — 93798 PHYS/QHP OP CAR RHAB W/ECG: CPT

## 2019-09-23 ENCOUNTER — HOSPITAL ENCOUNTER (OUTPATIENT)
Dept: CARDIAC REHAB | Age: 64
Discharge: HOME | End: 2019-09-23
Payer: COMMERCIAL

## 2019-09-23 VITALS — BODY MASS INDEX: 24.41 KG/M2 | WEIGHT: 180 LBS

## 2019-09-23 PROCEDURE — 93798 PHYS/QHP OP CAR RHAB W/ECG: CPT

## 2019-09-25 ENCOUNTER — HOSPITAL ENCOUNTER (OUTPATIENT)
Dept: CARDIAC REHAB | Age: 64
Discharge: HOME | End: 2019-09-25
Payer: COMMERCIAL

## 2019-09-25 VITALS — BODY MASS INDEX: 24.55 KG/M2 | WEIGHT: 181 LBS

## 2019-09-25 PROCEDURE — 93798 PHYS/QHP OP CAR RHAB W/ECG: CPT

## 2019-09-27 ENCOUNTER — HOSPITAL ENCOUNTER (OUTPATIENT)
Dept: CARDIAC REHAB | Age: 64
Discharge: HOME | End: 2019-09-27
Payer: COMMERCIAL

## 2019-09-27 VITALS — WEIGHT: 180 LBS | BODY MASS INDEX: 24.41 KG/M2

## 2019-09-27 PROCEDURE — 93798 PHYS/QHP OP CAR RHAB W/ECG: CPT

## 2019-09-30 ENCOUNTER — HOSPITAL ENCOUNTER (OUTPATIENT)
Dept: CARDIAC REHAB | Age: 64
Discharge: HOME | End: 2019-09-30
Payer: COMMERCIAL

## 2019-09-30 VITALS — WEIGHT: 180 LBS | BODY MASS INDEX: 24.41 KG/M2

## 2019-09-30 PROCEDURE — 93798 PHYS/QHP OP CAR RHAB W/ECG: CPT

## 2019-09-30 NOTE — CARDIO/PULMONARY
Liban Boyd. Completed phase II cardiac rehab and attended  20 sessions from 8/6/2019 to 9/30/2019 Liban Boyd. is interested in maintaining optimal health and will work with Dr Thai Mcgovern MD.  Liban Boyd. has improved his endurance and stamina through regular exercise, lost 3 lbs . He has also improved his nutrition, and meet with the dietician for a 1:1 meeting. [facility] and depression scores and these were reviewed with patient. Pt states he has learned a lot and will be able to work out at home. [address] to continue exercising at home  and has set revised goals that include cardio equipment, light weights and walking 3 to 5 times a week.

## 2019-10-02 ENCOUNTER — APPOINTMENT (OUTPATIENT)
Dept: CARDIAC REHAB | Age: 64
End: 2019-10-02

## 2019-10-04 ENCOUNTER — APPOINTMENT (OUTPATIENT)
Dept: CARDIAC REHAB | Age: 64
End: 2019-10-04

## 2019-11-18 ENCOUNTER — OFFICE VISIT (OUTPATIENT)
Dept: CARDIOLOGY CLINIC | Age: 64
End: 2019-11-18

## 2019-11-18 VITALS
BODY MASS INDEX: 24.57 KG/M2 | HEART RATE: 75 BPM | RESPIRATION RATE: 18 BRPM | OXYGEN SATURATION: 98 % | DIASTOLIC BLOOD PRESSURE: 78 MMHG | SYSTOLIC BLOOD PRESSURE: 148 MMHG | WEIGHT: 181.4 LBS | HEIGHT: 72 IN

## 2019-11-18 DIAGNOSIS — Z95.1 S/P CABG X 3: ICD-10-CM

## 2019-11-18 DIAGNOSIS — I25.10 CORONARY ARTERY DISEASE INVOLVING NATIVE CORONARY ARTERY OF NATIVE HEART WITHOUT ANGINA PECTORIS: Primary | ICD-10-CM

## 2019-11-18 DIAGNOSIS — I10 ESSENTIAL HYPERTENSION: ICD-10-CM

## 2019-11-18 RX ORDER — ATORVASTATIN CALCIUM 80 MG/1
80 TABLET, FILM COATED ORAL DAILY
Qty: 90 TAB | Refills: 3 | Status: SHIPPED | OUTPATIENT
Start: 2019-11-18 | End: 2020-11-04

## 2019-11-18 NOTE — PROGRESS NOTES
Venancio Oconnor MD    [id number] [facility] [place], [address] [facility]    Office [phone],UED [phone]  Pager [phone]    Malia Nunez. is a 71 y.o. male is here for f/u visit for CAD    Primary care physician:  [facility], Pcp    Patient Active Problem List   Diagnosis Code    Chest pain R07.9    GERD (gastroesophageal reflux disease) K21.9    Ulcerative colitis, chronic ([facility] [address] K51.90    Elevated blood pressure reading R03.0    NSTEMI (non-ST elevated myocardial infarction) ([facility] [address] I21.4    CAD (coronary artery disease) I25.10    S/P CABG x 3 Z95.1           I had the pleasure of seeing Mr. Malia Caldwell in the office today. Chief complaint:  Chief Complaint   Patient presents with    Follow-up     Patient presents today for a follow up visit for CAD & HTN. Patient also had an Echo done on 11/12/19    Coronary Artery Disease    Hypertension       Assessment:  CAD s/p CAGB*3 7/5/19  ( NSTEMI)  HTN  GERD  Hx of Ulcerative colitis  \" Passing out\"/sycnope with B Blockers started in hosp prior to DC. B Blockers DC'd. Plan:   Per patient-Home systolic blood pressure is well controlled. Component of whitecoat hypertension. Continue medications. Continue cardiac rehab. Follow-up in 6mths or earlier prn  Scheduled to undergo colonoscopy in Jan - can proceed. Lipitor 80 mg daily - Lipids next visit  Aggressive cardiovascular risk factor modification. Patient understands the plan. All questions were answered to the patient's satisfaction. Medication Side Effects and Warnings were discussed with patient: yes  Patient Labs were reviewed and or requested:  yes  Patient Past Records were reviewed and or requested: yes    I appreciate the opportunity to be involved in . See note below for details. Please do not hesitate to contact us with questions or concerns. Venancio Oconnor MD    Cardiac Testing/ Procedures: A. Cardiac Cath/PCI: 7/5/19 R radial access     Tortuous R SCV     L Main: Large; Distal 60% at trifurcation     LAD: Med; Prox 60%; haziness - thrombus vs Ca+ ; Small D1/ Small to med D2/D3; L to R collaterals present     LCflex: Med; Nml Mid 60%     RCA:  Med size; prox 70%; Distal 50%; PDA and PLB - MLI     LVEDP: Nml     LVEF: Not assessed/Difficulty getting into LV sec to tortuous R SCV     No significant gradient across aortic valve.           Closure Device: TR band    B.ECHO/BERTRAND:  11/12/19 - EF 56-60%; Grade 1 DD; Mild MR/Mild TR  7/5/19 - Mild LVH; EF 55-60%    C. StressNuclear/Stress ECHO/Stress test:    D.Vascular: 7/5/19 - Carotid doppler - <50% bilat ICA    E. EP:    F. Miscellaneous: 7/8/19 - LIMA to LAD; SVG to OM1; SVG to RCA ( Dr Savita Figueroa)    Subjective: Mak England. is a 71 y.o. male who returns for follow up today. Usually when he goes to the doctor's offices his blood pressure is elevated. On discharge from the hospital,a beta-blocker was started but he passed out because of low blood pressure. It was discontinued. Works for the [employer] for [employer]. Planning to retire after a year. ROS:  (bold if positive, if negative)             Medications before admission:    Current Outpatient Medications   Medication Sig Dispense    aspirin 81 mg chewable tablet Take 1 Tab by mouth daily. 30 Tab    ascorbic acid, vitamin C, (VITAMIN C) 1,000 mg tablet Take 1 Tab by mouth daily (with breakfast). 30 Tab    ferrous sulfate 325 mg (65 mg iron) tablet Take 1 Tab by mouth daily (with breakfast). 30 Tab    mesalamine DR (ASACOL HD) 800 mg DR tablet Take 800 mg by mouth daily.  omeprazole (PRILOSEC) 40 mg capsule Take 40 mg by mouth daily.  atorvastatin (LIPITOR) 80 mg tablet Take 1 Tab by mouth daily. (Patient not taking: Reported on 11/18/2019) 30 Tab     No current facility-administered medications for this visit.         Family History of CAD: No    Social History:  Current  Smoker  No    Physical Exam:  Visit Vitals  /78 (BP 1 Location: Left arm, BP Patient Position: Sitting)   Pulse 75   Resp 18   Ht 6' (1.829 m)   Wt 181 lb 6.4 oz (82.3 kg)   SpO2 98%   BMI 24.60 kg/m²          Gen: Well-developed, well-nourished, in no acute distress  Neck: Supple,No JVD, No Carotid Bruit,   Resp: No accessory muscle use, Clear breath sounds, No rales or rhonchi  Card: Regular Rate,Rythm,Normal S1, S2, No murmurs, rubs or gallop. No thrills.    Abd:  Soft, non-tender, non-distended,BS+,   MSK: No cyanosis  Skin: No rashes    Neuro: moving all four extremities , follows commands appropriately  Psych:  Good insight, oriented to person, place , alert, Nml Affect  LE: No edema    EKG:       LABS:        Lab Results   Component Value Date/Time    WBC 13.1 (H) 07/12/2019 04:11 AM    HGB 10.9 (L) 07/12/2019 04:11 AM    HCT 34.5 (L) 07/12/2019 04:11 AM    PLATELET 135 13/52/5387 04:11 AM     Lab Results   Component Value Date/Time    Sodium 139 07/12/2019 04:11 AM    Potassium 3.6 07/12/2019 04:11 AM    Chloride 108 07/12/2019 04:11 AM    CO2 25 07/12/2019 04:11 AM    Anion gap 6 07/12/2019 04:11 AM    Glucose 105 (H) 07/12/2019 04:11 AM    BUN 13 07/12/2019 04:11 AM    Creatinine 0.87 07/12/2019 04:11 AM    BUN/Creatinine ratio 15 07/12/2019 04:11 AM    GFR est AA >60 07/12/2019 04:11 AM    GFR est non-AA >60 07/12/2019 04:11 AM    Calcium 8.6 07/12/2019 04:11 AM       Lab Results   Component Value Date/Time    aPTT 32.2 (H) 07/10/2019 03:41 AM     Lab Results   Component Value Date/Time    INR 1.3 (H) 07/10/2019 03:41 AM    INR 1.4 (H) 07/08/2019 12:24 PM    INR 1.1 07/05/2019 04:45 PM    Prothrombin time 12.7 (H) 07/10/2019 03:41 AM    Prothrombin time 13.5 (H) 07/08/2019 12:24 PM    Prothrombin time 11.5 (H) 07/05/2019 04:45 PM     No components found for: LAST LIPIDS    ATTENTION:   This medical record was transcribed using an electronic medical records/speech recognition system. Although proofread, it may and can contain electronic, spelling and other errors. Corrections may be executed at a later time. Please feel free to contact us for any clarifications as needed.         Murray Baron MD

## 2019-11-18 NOTE — PROGRESS NOTES
Chief Complaint   Patient presents with    Follow-up     Patient presents today for a follow up visit for CAD & HTN.  Patient also had an Echo done on 11/12/19    Coronary Artery Disease    Hypertension     Visit Vitals  /78 (BP 1 Location: Left arm, BP Patient Position: Sitting)   Pulse 75   Resp 18   Ht 6' (1.829 m)   Wt 181 lb 6.4 oz (82.3 kg)   SpO2 98%   BMI 24.60 kg/m²       Chest pain denied  SOB - denied  Dizziness denied  Swelling/Edema - denied  Recent hospital visit denied

## 2020-01-07 ENCOUNTER — HOSPITAL ENCOUNTER (OUTPATIENT)
Age: 65
Setting detail: OUTPATIENT SURGERY
Discharge: HOME | End: 2020-01-07
Attending: SPECIALIST | Admitting: SPECIALIST
Payer: COMMERCIAL

## 2020-01-07 ENCOUNTER — ANESTHESIA EVENT (OUTPATIENT)
Dept: ENDOSCOPY | Age: 65
End: 2020-01-07
Payer: COMMERCIAL

## 2020-01-07 ENCOUNTER — ANESTHESIA (OUTPATIENT)
Dept: ENDOSCOPY | Age: 65
End: 2020-01-07
Payer: COMMERCIAL

## 2020-01-07 VITALS
OXYGEN SATURATION: 98 % | RESPIRATION RATE: 17 BRPM | HEIGHT: 72 IN | SYSTOLIC BLOOD PRESSURE: 112 MMHG | HEART RATE: 71 BPM | TEMPERATURE: 97.9 F | WEIGHT: 173.5 LBS | BODY MASS INDEX: 23.5 KG/M2 | DIASTOLIC BLOOD PRESSURE: 73 MMHG

## 2020-01-07 PROCEDURE — 74011250636 HC RX REV CODE- 250/636: Performed by: PHYSICIAN ASSISTANT

## 2020-01-07 PROCEDURE — 74011250636 HC RX REV CODE- 250/636: Performed by: NURSE ANESTHETIST, CERTIFIED REGISTERED

## 2020-01-07 PROCEDURE — 88305 TISSUE EXAM BY PATHOLOGIST: CPT

## 2020-01-07 PROCEDURE — 77030021593 HC FCPS BIOP ENDOSC BSC -A: Performed by: SPECIALIST

## 2020-01-07 PROCEDURE — 76060000031 HC ANESTHESIA FIRST 0.5 HR: Performed by: SPECIALIST

## 2020-01-07 PROCEDURE — 74011000250 HC RX REV CODE- 250: Performed by: NURSE ANESTHETIST, CERTIFIED REGISTERED

## 2020-01-07 PROCEDURE — 76040000019: Performed by: SPECIALIST

## 2020-01-07 RX ORDER — FENTANYL CITRATE 50 UG/ML
25 INJECTION, SOLUTION INTRAMUSCULAR; INTRAVENOUS AS NEEDED
Status: DISCONTINUED | OUTPATIENT
Start: 2020-01-07 | End: 2020-01-07 | Stop reason: HOSPADM

## 2020-01-07 RX ORDER — MULTIVIT WITH IRON,MINERALS
TABLET,CHEWABLE ORAL DAILY
COMMUNITY

## 2020-01-07 RX ORDER — PROPOFOL 10 MG/ML
INJECTION, EMULSION INTRAVENOUS
Status: DISCONTINUED | OUTPATIENT
Start: 2020-01-07 | End: 2020-01-07 | Stop reason: HOSPADM

## 2020-01-07 RX ORDER — ATROPINE SULFATE 0.1 MG/ML
0.5 INJECTION INTRAVENOUS
Status: DISCONTINUED | OUTPATIENT
Start: 2020-01-07 | End: 2020-01-07 | Stop reason: HOSPADM

## 2020-01-07 RX ORDER — NALOXONE HYDROCHLORIDE 0.4 MG/ML
0.4 INJECTION, SOLUTION INTRAMUSCULAR; INTRAVENOUS; SUBCUTANEOUS
Status: DISCONTINUED | OUTPATIENT
Start: 2020-01-07 | End: 2020-01-07 | Stop reason: HOSPADM

## 2020-01-07 RX ORDER — SODIUM CHLORIDE 9 MG/ML
50 INJECTION, SOLUTION INTRAVENOUS CONTINUOUS
Status: DISCONTINUED | OUTPATIENT
Start: 2020-01-07 | End: 2020-01-07 | Stop reason: HOSPADM

## 2020-01-07 RX ORDER — EPINEPHRINE 0.1 MG/ML
1 INJECTION INTRACARDIAC; INTRAVENOUS
Status: DISCONTINUED | OUTPATIENT
Start: 2020-01-07 | End: 2020-01-07 | Stop reason: HOSPADM

## 2020-01-07 RX ORDER — SODIUM CHLORIDE 9 MG/ML
INJECTION, SOLUTION INTRAVENOUS
Status: DISCONTINUED | OUTPATIENT
Start: 2020-01-07 | End: 2020-01-07 | Stop reason: HOSPADM

## 2020-01-07 RX ORDER — PROPOFOL 10 MG/ML
INJECTION, EMULSION INTRAVENOUS AS NEEDED
Status: DISCONTINUED | OUTPATIENT
Start: 2020-01-07 | End: 2020-01-07 | Stop reason: HOSPADM

## 2020-01-07 RX ORDER — MIDAZOLAM HYDROCHLORIDE 1 MG/ML
.25-5 INJECTION, SOLUTION INTRAMUSCULAR; INTRAVENOUS AS NEEDED
Status: DISCONTINUED | OUTPATIENT
Start: 2020-01-07 | End: 2020-01-07 | Stop reason: HOSPADM

## 2020-01-07 RX ORDER — DEXTROMETHORPHAN/PSEUDOEPHED 2.5-7.5/.8
1.2 DROPS ORAL
Status: DISCONTINUED | OUTPATIENT
Start: 2020-01-07 | End: 2020-01-07 | Stop reason: HOSPADM

## 2020-01-07 RX ORDER — FLUMAZENIL 0.1 MG/ML
0.2 INJECTION INTRAVENOUS
Status: DISCONTINUED | OUTPATIENT
Start: 2020-01-07 | End: 2020-01-07 | Stop reason: HOSPADM

## 2020-01-07 RX ORDER — LIDOCAINE HYDROCHLORIDE 20 MG/ML
INJECTION, SOLUTION EPIDURAL; INFILTRATION; INTRACAUDAL; PERINEURAL AS NEEDED
Status: DISCONTINUED | OUTPATIENT
Start: 2020-01-07 | End: 2020-01-07 | Stop reason: HOSPADM

## 2020-01-07 RX ADMIN — PROPOFOL 80 MG: 10 INJECTION, EMULSION INTRAVENOUS at 10:05

## 2020-01-07 RX ADMIN — SODIUM CHLORIDE: 9 INJECTION, SOLUTION INTRAVENOUS at 09:45

## 2020-01-07 RX ADMIN — LIDOCAINE HYDROCHLORIDE 70 MG: 20 INJECTION, SOLUTION INTRAVENOUS at 10:05

## 2020-01-07 RX ADMIN — SODIUM CHLORIDE 50 ML/HR: 900 INJECTION, SOLUTION INTRAVENOUS at 09:38

## 2020-01-07 RX ADMIN — PROPOFOL 100 MCG/KG/MIN: 10 INJECTION, EMULSION INTRAVENOUS at 10:05

## 2020-01-07 NOTE — PERIOP NOTES
Report from Gianni Lobo, NITA, see anesthesia record. ABD remains soft and non-tender post procedure. Pt has no complaints at this time and tolerated the procedure well. Endoscope was pre-cleaned at bedside immediately following procedure by Shikha Ward.

## 2020-01-07 NOTE — INTERVAL H&P NOTE
Pre-Endoscopy H&P Update  Chief complaint/HPI/ROS:  The indication for the procedure, the patient's history and the patient's current medications are reviewed prior to the procedure and that data is reported on the H&P to which this document is attached. Any significant complaints with regard to organ systems will be noted, and if not mentioned then a review of systems is not contributory. Past Medical History:   Diagnosis Date    CAD (coronary artery disease)     NSTEMI with CABG X 3  7/2019    GERD (gastroesophageal reflux disease)     Hypertension     Ill-defined condition     Ulcerative colitis, chronic (HCC)       Past Surgical History:   Procedure Laterality Date    CARDIAC SURG PROCEDURE UNLIST      CABG x3 7/8/2019     Social   Social History     Tobacco Use    Smoking status: Former Smoker    Smokeless tobacco: Never Used    Tobacco comment: quit over 25 years ago   Substance Use Topics    Alcohol use: Never     Frequency: Never      Family History   Problem Relation Age of Onset    GERD Mother     Inflammatory Bowel Dz Mother     Inflammatory Bowel Dz Father       No Known Allergies   Prior to Admission Medications   Prescriptions Last Dose Informant Patient Reported? Taking?   ascorbic acid, vitamin C, (VITAMIN C) 1,000 mg tablet 12/31/2019 at Unknown time  No Yes   Sig: Take 1 Tab by mouth daily (with breakfast). aspirin 81 mg chewable tablet 1/7/2020 at Unknown time  No Yes   Sig: Take 1 Tab by mouth daily. atorvastatin (LIPITOR) 80 mg tablet 1/5/2020 at Unknown time  No Yes   Sig: Take 1 Tab by mouth daily. ferrous sulfate 325 mg (65 mg iron) tablet 12/31/2019 at Unknown time  No Yes   Sig: Take 1 Tab by mouth daily (with breakfast). glucosamine-chondroitin (OSTEO BI-FLEX) 250-200 mg tab 1/5/2020  Yes Yes   Sig: Take  by mouth daily. mesalamine DR (ASACOL HD) 800 mg DR tablet 1/5/2020  Yes No   Sig: Take 800 mg by mouth daily.    omeprazole (PRILOSEC) 40 mg capsule 1/5/2020  Yes No   Sig: Take 40 mg by mouth daily. Facility-Administered Medications: None       PHYSICAL EXAM:  The patient is examined immediately prior to the procedure. Visit Vitals  /69   Pulse 79   Temp 97.9 °F (36.6 °C)   Resp 13   Ht 6' (1.829 m)   Wt 78.7 kg (173 lb 8 oz)   SpO2 96%   BMI 23.53 kg/m²     Gen: Appears comfortable, no distress. Pulm: comfortable respirations with no abnormal audible breath sounds  HEART: well perfused, no abnormal audible heart sounds  GI: abdomen flat. PLAN:  Informed consent discussion held, patient afforded an opportunity to ask questions and all questions answered. After being advised of the risks, benefits, and alternatives, the patient requested that we proceed and indicated so on a written consent form. Will proceed with procedure as planned.   Tito Fung MD

## 2020-01-07 NOTE — PROGRESS NOTES
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 Yaa Landa   Assistant(s): Endoscopy Technician-1: Max Cordova  Endoscopy RN-1: Arturo Ramirez RN    Specimens:   ID Type Source Tests Collected by Time Destination   1 : [facility] Preservative   Davon Birch MD 1/7/2020 1009 Pathology   2 : Right Colon Biopsies Preservative   Davon Birch MD 1/7/2020 1014 Pathology   3 : Left Colon Biopsies Preservative   Davon Birch MD 1/7/2020 1014 Pathology     H. Pylori  no    Assessment:  Intra-procedure medications       Anesthesia gave intra-procedure sedation and medications, see anesthesia flow sheet yes    Intravenous fluids: NS@ KVO     Vital signs stable   yes    Abdominal assessment: round and soft   yes    Recommendation:  Discharge patient per MD order  yes.   Return to floor  Outpatient   Family or Friend   Friend   Permission to share finding with family or friend yes

## 2020-01-07 NOTE — PROCEDURES
[facility] AISHWARYA Dr[address]  [phone]      2020    Esophagogastroduodenoscopy & Colonoscopy Procedure Note  Juan Carlos Moscoso. : 1955  [facility] Medical Record Number: [id number]      Indications:    GERD, History of ocampo's esophagus. Ulcerative colitis  Referring Physician:  [facility], Pcp  Anesthesia/Sedation: Conscious Sedation/Moderate Sedation/MAC  Endoscopist:  Dr. Jorge Vazquez  Complications:  None  Estimated Blood Loss:  None    Permit:  The indications, risks, benefits and alternatives were reviewed with the patient or their decision maker who was provided an opportunity to ask questions and all questions were answered. The specific risks of esophagogastroduodenoscopy with conscious sedation were reviewed, including but not limited to anesthetic complication, bleeding, adverse drug reaction, missed lesion, infection, IV site reactions, and intestinal perforation which would lead to the need for surgical repair. Alternatives to EGD and colonoscopy including radiographic imaging, observation without testing, or laboratory testing were reviewed as well as the limitations of those alternatives discussed. After considering the options and having all their questions answered, the patient or their decision maker provided both verbal and written consent to proceed. -----------EGD------------   Procedure in Detail:  After obtaining informed consent, positioning of the patient in the left lateral decubitus position, and conduction of a pre-procedure pause or \"time out\" the endoscope was introduced into the mouth and advanced to the duodenum. A careful inspection was made, and findings or interventions are described below. Findings:   Esophagus: There is a short segment of Ocampo's appearing epithelium, irregular with islands but only 2cm in length.   [place] C0M2 - cold forceps biopsies obtained for histology. Stomach: normal   Duodenum/jejunum: normal        ----------Colonoscopy-----------    Procedure in Detail:  After obtaining informed consent, positioning of the patient in the left lateral decubitus position, and conduction of a pre-procedure pause or \"time out\" the endoscope was introduced into the anus and advanced to the cecum, which was identified by the ileocecal valve and appendiceal orifice. The quality of the colonic preparation was good. A careful inspection was made as the colonoscope was withdrawn, findings and interventions are described below. Findings: There are universal/complete colonic mucosal changes consistent with mild to moderately active ulcerative colitis. Cold forceps biopsies taken from right colon and left colon. In the rectum, medium internal hemorrhoids are noted without bleeding.      ------------------------------  Specimens:    See above    Complications:   None; patient tolerated the procedure well. Impressions:  EGD:  Quezada's, but short segment. Colonoscopy: Ulcerative colitis appears endoscopically active despite his report of well controlled symptoms. Recommendations:     - Await pathology.  -Will recommend medication adjustment based on biopsy results. Thank you for entrusting me with this patient's care. Please do not hesitate to contact me with any questions or if I can be of assistance with any of your other patients' GI needs. Signed By: Ryanne Pascual MD                        January 7, 2020    Surgical assistant none. Implants none unless specified.

## 2020-01-07 NOTE — ANESTHESIA POSTPROCEDURE EVALUATION
Procedure(s):  COLONOSCOPY/ AND ESOPHAGOGASTRODUODENOSCOPY (EGD)  ESOPHAGOGASTRODUODENOSCOPY (EGD)  ESOPHAGOGASTRODUODENAL (EGD) BIOPSY. MAC    Anesthesia Post Evaluation        Patient location during evaluation: bedside  Level of consciousness: awake  Pain management: satisfactory to patient  Airway patency: patent  Anesthetic complications: no  Cardiovascular status: acceptable  Respiratory status: acceptable  Hydration status: acceptable        Vitals Value Taken Time   /69 1/7/2020 10:23 AM   Temp     Pulse 70 1/7/2020 10:24 AM   Resp 14 1/7/2020 10:24 AM   SpO2 99 % 1/7/2020 10:24 AM   Vitals shown include unvalidated device data.

## 2020-01-07 NOTE — DISCHARGE INSTRUCTIONS
[address] [facility] AISHWARYA Shetty MD  [phone]      January 7, 2020    Juan Carlos Moscoso. YOB: 1955    COLONOSCOPY DISCHARGE INSTRUCTIONS    If there is redness at IV site you should apply warm compress to area. If redness or soreness persist contact Dr. Driss Shetty' or your primary care doctor. There may be a slight amount of blood passed from the rectum. Gaseous discomfort may develop, but walking, belching will help relieve this. You may not operate a vehicle for 12 hours  You may not operate machinery or dangerous appliances for rest of today  You may not drink alcoholic beverages for 12 hours  Avoid making any critical decisions for 24 hours    DIET:  You may resume your normal diet, but some patients find that heavy or large meals may lead to indigestion or vomiting. I suggest a light meal as first food intake. MEDICATIONS:  The use of some over-the-counter pain medication may lead to bleeding after colon biopsies or polyp removal.  Tylenol (also called acetaminophen) is safe to take even if you have had colonoscopy with polyp removal.  Based on the procedure you had today you may not safely take aspirin or aspirin-like products for the next ten (10) days. Remember that Tylenol (also called acetaminophen) is safe to take after colonoscopy even if you have had biopsies or polyps removed. ACTIVITY:  You may resume your normal household activities, but it is recommended that you spend the remainder of the day resting -  avoid any strenuous activity.     CALL DR. Elen Oh' OFFICE IF:  Increasing pain, nausea, vomiting  Abdominal distension (swelling)  Significant new or increased bleeding (oral or rectal)  Fever/Chills  Chest pain/shortness of breath                       Additional instructions:   No aspirin 10 days  The esophagus looks good without dangerous findings, you do appear to have Barretts but it is a short segment without alarming changes. The colon appears to be inflammed, and I took biopsies of that as well. I will contact you with the biopsy results and plans in about a week. It was an honor to be your doctor today. Please let me or my office staff know if you have any feedback about today's procedure. Aldair Vaughn MD    Colonoscopy saves lives, and can prevent colon cancer. Everyone aged 48 or older needs colonoscopy.   Tell your family and friends: get the test!

## 2020-01-07 NOTE — H&P
Date: 2019 3:00 PM   Patient Name: Kwan Shell. Account #: [id number]    Gender: Male    (age): 1955 (71)       Provider:     Cesar Hitchcock NP        Referring Physician:     Veronique Schultz. [address] [place] [address], [address] [place] [address] [place]  [phone] (phone)  [phone] (fax)     Devi Gonzalez MD  [address] [place] [address], [address] [place] [address] Ave  [phone] (phone)  [phone] (fax)        Chief Complaint: Ulcerative Colitis           History of Present Illness:   Since his last visit, he has had an MI and a CABG earlier this year, at least 6 months. He did cardiac rehab and continues to do his exercises. Due to have cardiac perfusion scan on next Tuesday and sees Cardiology on the . Diagnosed with left sided UC in . He is currently on generic Asacol. He is not having any abdominal pain. Occasional bloat associated with change in diet. He has some lactose intolerance. BMs are 2-3 per day, soft and mostly formed. No blood or mucous. No joint pain. No apthous ulcers. He has lost weight with rehab, but is currently stable. BE diagnosed in , no dysplasia on most recent EGD . No heartburn or indigestion. No dysphagia. He is taking Prilosec. ? ? Since his last visit, he has had an MI and a CABG earlier this year, at least 6 months. He did cardiac rehab and continues to do his exercises. Due to have cardiac perfusion scan on next Tuesday and sees Cardiology on the . Diagnosed with left sided UC in . He is currently on generic Asacol. He is not having any abdominal pain. Occasional bloat associated with change in diet. He has some lactose intolerance. BMs are 2-3 per day, soft and mostly formed. No blood or mucous. No joint pain. No apthous ulcers. He has lost weight with rehab, but is currently stable. BE diagnosed in , no dysplasia on most recent EGD 2016. No heartburn or indigestion. No dysphagia. He is taking Prilosec.           Past Medical History      Medical Conditions: Barretts Esophagus  Colitis  GERD  Ulcerative colitis--11/10/11   Surgical Procedures: Coronary Artery By-Pass   Dx Studies: Colonoscopy, 11/2011, ulcerative colitis  EGD, 3/10/2016, Mucosa suggestive of Quezada's esophagus (Biopsy)  EGD, 11/2011, GERD and Barretts Esophagus  Endo Posting, 1/22/2019  Endoscopy  Patient Service Interview, 11/4/2019  Pre-Procedure Call, 1/2/2014   Medications: glucosamine sulfate 500 mg 1qd  mesalamine 800 mg TAKE 1 TABLET BY MOUTH THREE TIMES A DAY  Prilosec 20 mg Take 1 capsule by mouth once a day for 90 days   Allergies: Patient has no known allergies or drug allergies   Immunizations: Influenza, seasonal, injectable, 10/01/2019      Social History      Alcohol: None   Tobacco: Former smokerCigarettes 2 packs a day, quit 2000. Drugs: None   Exercise: Cardio 30 minutes 3x times a week. Exercise 3 or more times a week. Weight Training 45 minutes 3x times a week. Caffeine: [employer] Mix every once in a while. Daily. Marital Status:          Occupation:               Family History No history of Colon Cancer, Colon Polyps, Esophogeal Cancer, GI Cancers, IBD (Crohn's or UC), Liver disease  Other: Diagnosed with Crohn's disease or ulcerative colitis; Review of Systems:   Cardiovascular: Denies chest pain, irregular heart beat, palpitations, peripheral edema, syncope, Sweats. Constitutional: Denies fatigue, fever, loss of appetite, weight gain, weight loss. ENMT: Denies nose bleeds, sore throat, hearing loss. Endocrine: Denies excessive thirst, heat intolerance. Eyes: Denies loss of vision. Gastrointestinal: Denies abdominal pain, abdominal swelling, change in bowel habits, constipation, diarrhea, Bloating/gas, heartburn, jaundice, nausea, rectal bleeding, stomach cramps, vomiting, dysphagia, rectal pain, Stool incontinence, hematemesis.    Genitourinary: Denies dark urine, dysuria, frequent urination, hematuria, incontinence. Hematologic/Lymphatic: Denies easy bruising, prolonged bleeding. Integumentary: Denies itching, rashes, sun sensitivity. Musculoskeletal: Denies arthritis, back pain, gout, joint pain, muscle weakness, stiffness. Neurological: Denies dizziness, fainting, frequent headaches, memory loss. Psychiatric: Denies anxiety, depression, difficulty sleeping, hallucinations, nervousness, panic attacks, paranoia. Respiratory: Denies cough, dyspnea, wheezing. Vital Signs:   BP  (mmHg)  Pulse  (ppm) Weight (lbs/oz) Height (ft/in) BMI Temp   151/88 65 177 / 8 6 / 0 24.07 97.5 (F)      Physical Exam:   Constitutional:      Appearance: No distress, appears comfortable. Communication: Understands/receives spoken information. Head/face: Inspection: Normacephalic, atraumatic. Palpation: normal.   Eyes:      Conjunctivae/lids: Normal.   Pupils/irises: Pupils equal, round and normal.   ENMT:      External: Normal.   Hearing: Normal.   Respiratory:      Effort: Normal respiratory effort, comfortable, speaks in complete sentences. Auscultation: normal breath sounds, no rubs, wheezes or rhonchi. Cardiovascular: Auscultation: normal, S1 and S2, no gallops , no rubs or murmurs . Peripheral: no edema. Gastrointestinal/Abdomen:      Liver/Spleen: normal, normal size, Liver size and consistency normal, spleen is non-palpable. Abdomen Exam: normal bowel sounds, non distended, non tender. Psychiatric:      Judgment/insight: Normal, normal judgement, normal insight. Orientation: oriented to time, space and person. Lab Results:      Test 1/16/2019 9/28/2016 3/10/2014 8/7/2013 3/21/2013 Units Limits   Comp.  Metabolic Panel (14)          A/G Ratio 1.6 2.0 2.0 2.0 2.1  1.1 - 2.5   Albumin, Serum 4.2 4.5 4.4 4.5 4.4 g/dL 3.5 - 5.5   Alkaline Phosphatase, S 96 99 105 85 75 IU/L 25 - 150   ALT (SGPT) 12 18 16 20 17 IU/L 0 - 55   AST (SGOT) 17 18 17 24 15 IU/L 0 - 40   Bilirubin, Total <0.2 0.3 0.2 0.4 0.3 mg/dL 0 - 1.2   BUN 15 14 14 14 22 mg/dL 6 - 24   BUN/Creatinine Ratio 14 10 10 10 17  9 - 20   Calcium, Serum 9.2 9.4 9.2 9.3 9.3 mg/dL 8.7 - 10.2   Carbon Dioxide, Total 24 25 20 24 22 mmol/L 20 - 32   Chloride, Serum 101 98 103 100 102 mmol/L 97 - 108   Creatinine, Serum 1.09 1.38 1.39 1.47 1.31 mg/dL 0.76 - 1.27   eGFR If Africn Am 82 63 64 60 69 mL/min/1.73 >59   eGFR If NonAfricn Am 71 55 55 52 60 mL/min/1.73 >59   Globulin, Total 2.7 2.2 2.2 2.2 2.1 g/dL 1.5 - 4.5   Glucose, Serum 97 96 109 94 95 mg/dL 65 - 99   Potassium, Serum 4.9 4.8 4.2 3.9 4.1 mmol/L 3.5 - 5.2   Protein, Total, Serum 6.9 6.7 6.6 6.7 6.5 g/dL 6 - 8.5   Sodium, Serum 141 141 142 138 137 mmol/L 134 - 144   CBC With Differential/Platelet          Baso (Absolute) 0.0 0.0 0.0 0.1 0.0 x10E3/uL 0 - 0.2   Basos 0 0 1 1 1 % 0 - 3   Eos 4 2 6 5 4 % 0 - 7   Eos (Absolute) 0.4 0.2 0.5 0.3 0.3 x10E3/uL 0 - 0.4   Hematocrit 39.6 44.5 44.3 44.4 40.8 % 37.5 - 51   Hematology Comments:          Hemoglobin 12.9 14.8 14.6 14.6 13.3 g/dL 12.6 - 17.7   Immature Cells          Immature Grans (Abs) 0.0 0.0 0.0 0.0 0.0 x10E3/uL 0 - 0.1   Immature Granulocytes 0 0 0 0 0 % 0 - 2   Lymphs 25 34 36 32 33 % 14 - 46   Lymphs (Absolute) 2.5 2.6 2.8 2.4 2.4 x10E3/uL 0.7 - 4.5   MCH 25.3 27.5 26.9 27.3 25.0 pg 26.6 - 33   MCHC 32.6 33.3 33.0 32.9 32.6 g/dL 31.5 - 35.7   MCV 78 83 82 83 77 fL 79 - 97   Monocytes 7 9 8 7 7 % 4 - 13   Monocytes(Absolute) 0.7 0.7 0.6 0.6 0.5 x10E3/uL 0.1 - 1   Neutrophils 64 55 49 55 55 % 40 - 74   Neutrophils (Absolute) 6.3 4.2 3.9 4.2 3.9 x10E3/uL 1.8 - 7.8   NRBC          Platelets 986 254 381 254 292 x10E3/uL 140 - 415   RBC 5.09 5.38 5.43 5.35 5.31 x10E6/uL 4.14 - 5.8   RDW 15.3 14.0 14.4 14.7 16.1 % 12.3 - 15.4   WBC 9.9 7.7 7.9 7.6 7.2 x10E3/uL 4 - 10.5   C-Reactive Protein, Quant          C-Reactive Protein, Quant 6.1     mg/L 0 - 4.9   Test 10/19/2012 Units Limits   Comp.  Metabolic Panel (14)      A/G Ratio 1.7  1.1 - 2.5   Albumin, Serum 3.8 g/dL 3.5 - 5.5   Alkaline Phosphatase, S 80 IU/L 25 - 150   ALT (SGPT) 14 IU/L 0 - 55   AST (SGOT) 13 IU/L 0 - 40   Bilirubin, Total 0.2 mg/dL 0 - 1.2   BUN 16 mg/dL 6 - 24   BUN/Creatinine Ratio 13  9 - 20   Calcium, Serum 8.9 mg/dL 8.7 - 10.2   Carbon Dioxide, Total 26 mmol/L 20 - 32   Chloride, Serum 103 mmol/L 97 - 108   Creatinine, Serum 1.23 mg/dL 0.76 - 1.27   eGFR If Africn Am 74 mL/min/1.73 >59   eGFR If NonAfricn Am 64 mL/min/1.73 >59   Globulin, Total 2.3 g/dL 1.5 - 4.5   Glucose, Serum 93 mg/dL 65 - 99   Potassium, Serum 4.4 mmol/L 3.5 - 5.2   Protein, Total, Serum 6.1 g/dL 6 - 8.5   Sodium, Serum 142 mmol/L 134 - 144   CBC With Differential/Platelet      Baso (Absolute) 0.1 x10E3/uL 0 - 0.2   Basos 1 % 0 - 3   Eos 5 % 0 - 7   Eos (Absolute) 0.4 x10E3/uL 0 - 0.4   Hematocrit 36.4 % 37.5 - 51   Hematology Comments:      Hemoglobin 11.5 g/dL 12.6 - 17.7   Immature Cells      Immature Grans (Abs) 0.0 x10E3/uL 0 - 0.1   Immature Granulocytes 0 % 0 - 2   Lymphs 22 % 14 - 46   Lymphs (Absolute) 1.9 x10E3/uL 0.7 - 4.5   MCH 26.3 pg 26.6 - 33   MCHC 31.6 g/dL 31.5 - 35.7   MCV 83 fL 79 - 97   Monocytes 9 % 4 - 13   Monocytes(Absolute) 0.8 x10E3/uL 0.1 - 1   Neutrophils 63 % 40 - 74   Neutrophils (Absolute) 5.3 x10E3/uL 1.8 - 7.8   NRBC      Platelets 356 X73E9/ - 415   RBC 4.37 x10E6/uL 4.14 - 5.8   RDW 13.2 % 12.3 - 15.4   WBC 8.3 x10E3/uL 4 - 10.5   C-Reactive Protein, Quant      C-Reactive Protein, Quant  mg/L 0 - 4.9     Impressions: Quezada's esophagus without dysplasia  Ulcerative colitis, unspecified, without complications? ?Quezada's esophagus without dysplasia? ?  ? ? Ulcerative colitis, unspecified, without complications? Assessment: Mr. Nestor Whitney returns for follow up of his UC and BE. He is well-controlled on Asacol and Prilosec. He was due to have an EGD and Colonoscopy but had an MI and CABG. He is now greater than 6 months out and is on no anticoagulation.  I will get routine labs since he is on 5ASA and set up for EGD and Colonoscopy after his upcoming Cardiac scan and f/u with Dr. Delmy Arroyo.  ? Mr. Kassy Mark returns for follow up of his UC and BE. He is well-controlled on Asacol and Prilosec. He was due to have an EGD and Colonoscopy but had an MI and CABG. He is now greater than 6 months out and is on no anticoagulation. I will get routine labs since he is on 5ASA and set up for EGD and Colonoscopy after his upcoming Cardiac scan and f/u with Dr. Delmy Arroyo. Plan: Comp Metabolic Panel ([facility] #[id number])  CBC w/Diff w/Platelet ([facility] #[id number])  C-Reactive Protein, Quant ([facility] #[id number]) CRP  Urinalysis ([facility] #[id number])  Upper Endoscopy  Education on Endoscopy  Upper Endoscopy: Esophagastroduodenoscopy and its purposes explained to the patient point by point in detail. We went over risks and complications. These include but are not limited to aspiration of secretions, perforation of the intestinal track requiring surgery, infection, bleeding, reaction to medications, risks of sedation and anesthesia, and unforeseen problems. The patient understands and accepts the risks and would like to proceed. Colonoscopy  Education on Colonoscopy Prep  Colonoscopy Disclaimer: The nature and purpose of colonoscopy was explained to the patient in detail. Considerable time was spend discussing the risk and complications which include but are not limited to perforation of the intestinal track requiring surgery, bleeding reaction to medications, infection, the possibility of missing lesions due to preparation, allergic reactions to medication, risks of sedation and aesthesia, and unforeseen circumstances was explained to patient. The patient understands and accepts the risks and would like to proceed. Follow-up post procedure? ? Comp Metabolic Panel ([facility] #[id number])? ?  ?CBC w/Diff w/Platelet ([facility] #[id number])? ? ?C-Reactive Protein, Quant ([facility] #[id number]) CRP? ?  ? ? Urinalysis [facility] #[id number])? ?  ? ? Upper Endoscopy? ?  ? ? Education on Endoscopy? ?  ? Upper Endoscopy: ?Esophagastroduodenoscopy and its purposes explained to the patient point by point in detail. We went over risks and complications. These include but are not limited to aspiration of secretions, perforation of the intestinal track requiring surgery, infection, bleeding, reaction to medications, risks of sedation and anesthesia, and unforeseen problems. The patient understands and accepts the risks and would like to proceed. ? ?  ? ? Colonoscopy? ?  ? ? Education on Colonoscopy Prep? ?  ? ? Colonoscopy Disclaimer?: The nature and purpose of colonoscopy was explained to the patient in detail. Considerable time was spend discussing the risk and complications which include but are not limited to perforation of the intestinal track requiring surgery, bleeding reaction to medications, infection, the possibility of missing lesions due to preparation, allergic reactions to medication, risks of sedation and aesthesia, and unforeseen circumstances was explained to patient. The patient understands and accepts the risks and would like to proceed. ?  ? ? ? Follow-up post procedure? ? Risk & Medical Necessity: The patient requires Moderate to High Severity care for this visit. Diagnosis and management options are Multiple. The amount of data reviewed and/or ordered is Moderate. The level of risk is Moderate. Notes: Dr. Dorys Sandhu was available for consultation. Brisa Stevenson NP     Electronically signed on 2019 3:27:39 PM by Brisa Stevenson NP                                 Sakshi Alan, MRN [id number],  1955 Follow Up, Monday, 2019                                                                                                                                                        New     Modify          Delete     Delete all     Edit Wording          Sign     [email or web address]

## 2020-01-07 NOTE — ANESTHESIA PREPROCEDURE EVALUATION
Relevant Problems   No relevant active problems       Anesthetic History   No history of anesthetic complications            Review of Systems / Medical History  Patient summary reviewed, nursing notes reviewed and pertinent labs reviewed    Pulmonary  Within defined limits                 Neuro/Psych   Within defined limits           Cardiovascular    Hypertension          Past MI and CAD    Exercise tolerance: >4 METS     GI/Hepatic/Renal  Within defined limits   GERD: well controlled          Comments: UC Endo/Other  Within defined limits           Other Findings              Physical Exam    Airway  Mallampati: II  TM Distance: > 6 cm  Neck ROM: normal range of motion   Mouth opening: Normal     Cardiovascular  Regular rate and rhythm,  S1 and S2 normal,  no murmur, click, rub, or gallop             Dental  No notable dental hx       Pulmonary  Breath sounds clear to auscultation               Abdominal  GI exam deferred       Other Findings            Anesthetic Plan    ASA: 3  Anesthesia type: MAC    Monitoring Plan: Arterial line, BIS, CVP, Conley-Anisha and BERTRAND      Induction: Intravenous  Anesthetic plan and risks discussed with: Patient

## 2020-05-11 ENCOUNTER — TELEPHONE (OUTPATIENT)
Dept: CARDIOLOGY CLINIC | Age: 65
End: 2020-05-11

## 2020-05-11 DIAGNOSIS — I25.10 CORONARY ARTERY DISEASE INVOLVING NATIVE CORONARY ARTERY OF NATIVE HEART WITHOUT ANGINA PECTORIS: Primary | ICD-10-CM

## 2020-06-20 LAB
CHOLEST SERPL-MCNC: 100 MG/DL (ref 100–199)
HDLC SERPL-MCNC: 34 MG/DL
INTERPRETATION, 910389: NORMAL
LDLC SERPL CALC-MCNC: 42 MG/DL (ref 0–99)
TRIGL SERPL-MCNC: 118 MG/DL (ref 0–149)
VLDLC SERPL CALC-MCNC: 24 MG/DL (ref 5–40)

## 2020-06-24 ENCOUNTER — TELEPHONE (OUTPATIENT)
Dept: CARDIOLOGY CLINIC | Age: 65
End: 2020-06-24

## 2020-08-10 ENCOUNTER — OFFICE VISIT (OUTPATIENT)
Dept: CARDIOLOGY CLINIC | Age: 65
End: 2020-08-10
Payer: COMMERCIAL

## 2020-08-10 VITALS
WEIGHT: 186 LBS | DIASTOLIC BLOOD PRESSURE: 80 MMHG | OXYGEN SATURATION: 95 % | HEART RATE: 68 BPM | BODY MASS INDEX: 25.19 KG/M2 | HEIGHT: 72 IN | SYSTOLIC BLOOD PRESSURE: 138 MMHG

## 2020-08-10 DIAGNOSIS — I25.10 CORONARY ARTERY DISEASE INVOLVING NATIVE CORONARY ARTERY OF NATIVE HEART WITHOUT ANGINA PECTORIS: Primary | ICD-10-CM

## 2020-08-10 DIAGNOSIS — I10 ESSENTIAL HYPERTENSION: ICD-10-CM

## 2020-08-10 DIAGNOSIS — E78.5 DYSLIPIDEMIA: ICD-10-CM

## 2020-08-10 PROCEDURE — 93010 ELECTROCARDIOGRAM REPORT: CPT | Performed by: INTERNAL MEDICINE

## 2020-08-10 PROCEDURE — G8427 DOCREV CUR MEDS BY ELIG CLIN: HCPCS | Performed by: INTERNAL MEDICINE

## 2020-08-10 PROCEDURE — 1101F PT FALLS ASSESS-DOCD LE1/YR: CPT | Performed by: INTERNAL MEDICINE

## 2020-08-10 PROCEDURE — 3017F COLORECTAL CA SCREEN DOC REV: CPT | Performed by: INTERNAL MEDICINE

## 2020-08-10 PROCEDURE — G8419 CALC BMI OUT NRM PARAM NOF/U: HCPCS | Performed by: INTERNAL MEDICINE

## 2020-08-10 PROCEDURE — G8510 SCR DEP NEG, NO PLAN REQD: HCPCS | Performed by: INTERNAL MEDICINE

## 2020-08-10 PROCEDURE — 99214 OFFICE O/P EST MOD 30 MIN: CPT | Performed by: INTERNAL MEDICINE

## 2020-08-10 PROCEDURE — G8536 NO DOC ELDER MAL SCRN: HCPCS | Performed by: INTERNAL MEDICINE

## 2020-08-10 NOTE — PROGRESS NOTES
Chidi Gonzales MD    [address] [facility] [place], [address] [facility]    Office [phone],YVQ [phone]      Kandace Valverde is a 71 y.o. male is here for f/u visit for CAD    Primary care physician:  [facility], Pcp    Patient Active Problem List   Diagnosis Code    Chest pain R07.9    GERD (gastroesophageal reflux disease) K21.9    Ulcerative colitis, chronic ([facility] [place] [address] K51.90    Elevated blood pressure reading R03.0    NSTEMI (non-ST elevated myocardial infarction) ([facility] [place] [address] I21.4    CAD (coronary artery disease) I25.10    S/P CABG x 3 Z95.1           I had the pleasure of seeing Mr. Kandace Valverde in the office today. Chief complaint:  Chief Complaint   Patient presents with    Coronary Artery Disease    Hypertension       Assessment:  CAD s/p CAGB*3 7/5/19  ( NSTEMI)  HTN  GERD  Hx of Ulcerative colitis  \" Passing out\"/sycnope with B Blockers started in hosp prior to DC. B Blockers DC'd. Not on B Blockers currently      Plan:   Per patient-Home systolic blood pressure is well controlled. Component of whitecoat hypertension. Continue medications. Continue cardiac rehab. Follow-up in 12 mths or earlier prn  Scheduled to undergo colonoscopy in Jan - can proceed. Lipitor 80 mg daily - Lipids 6/20 - LDL 42  Aggressive cardiovascular risk factor modification. Patient understands the plan. All questions were answered to the patient's satisfaction. Medication Side Effects and Warnings were discussed with patient: yes  Patient Labs were reviewed and or requested:  yes  Patient Past Records were reviewed and or requested: yes    I appreciate the opportunity to be involved in . See note below for details. Please do not hesitate to contact us with questions or concerns. Chidi Gonzales MD    Cardiac Testing/ Procedures: A. Cardiac Cath/PCI: 7/5/19 R radial access     Tortuous R SCV     L Main: Large; Distal 60% at trifurcation     LAD: Med; Prox 60%; haziness - thrombus vs Ca+ ; Small D1/ Small to med D2/D3; L to R collaterals present     LCflex: Med; Nml Mid 60%     RCA:  Med size; prox 70%; Distal 50%; PDA and PLB - MLI     LVEDP: Nml     LVEF: Not assessed/Difficulty getting into LV sec to tortuous R SCV     No significant gradient across aortic valve.           Closure Device: TR band    B.ECHO/BERTRAND:  11/12/19 - EF 56-60%; Grade 1 DD; Mild MR/Mild TR  7/5/19 - Mild LVH; EF 55-60%    C. StressNuclear/Stress ECHO/Stress test:    D.Vascular: 7/5/19 - Carotid doppler - <50% bilat ICA    E. EP:    F. Miscellaneous: 7/8/19 - LIMA to LAD; SVG to OM1; SVG to RCA ( Dr Nita Tay)    Subjective: Marcial Buck. is a 71 y.o. male who returns for follow up today. Doing well from cardiac standpoint. No CP. No dyspnea. Works for the [employer]. Planning to retire this year. ROS:  (bold if positive, if negative)             Medications before admission:    Current Outpatient Medications   Medication Sig Dispense    glucosamine-chondroitin (OSTEO BI-FLEX) 250-200 mg tab Take  by mouth daily.  atorvastatin (LIPITOR) 80 mg tablet Take 1 Tab by mouth daily. 90 Tab    aspirin 81 mg chewable tablet Take 1 Tab by mouth daily. 30 Tab    ascorbic acid, vitamin C, (VITAMIN C) 1,000 mg tablet Take 1 Tab by mouth daily (with breakfast). 30 Tab    ferrous sulfate 325 mg (65 mg iron) tablet Take 1 Tab by mouth daily (with breakfast). 30 Tab    mesalamine DR (ASACOL HD) 800 mg DR tablet Take 800 mg by mouth daily. 3 times if needed     omeprazole (PRILOSEC) 40 mg capsule Take 40 mg by mouth daily. No current facility-administered medications for this visit.         Family History of CAD:    No    Social History:  Current  Smoker  No    Physical Exam:  Visit Vitals  /80 (BP 1 Location: Left arm, BP Patient Position: Sitting)   Pulse 68   Ht 6' (1.829 m)   Wt 186 lb (84.4 kg)   SpO2 95%   BMI 25.23 kg/m²          Gen: Well-developed, well-nourished, in no acute distress  Neck: Supple,No JVD, No Carotid Bruit,   Resp: No accessory muscle use, Clear breath sounds, No rales or rhonchi  Card: Regular Rate,Rythm,Normal S1, S2, No murmurs, rubs or gallop. No thrills. Abd:  Soft, non-tender, non-distended,BS+,   MSK: No cyanosis  Skin: No rashes    Neuro: moving all four extremities , follows commands appropriately  Psych:  Good insight, oriented to person, place , alert, Nml Affect  LE: No edema    EKG: Sinus rhythm, RBBB, nonspecific ST-T changes      LABS:        Lab Results   Component Value Date/Time    WBC 13.1 (H) 07/12/2019 04:11 AM    HGB 10.9 (L) 07/12/2019 04:11 AM    HCT 34.5 (L) 07/12/2019 04:11 AM    PLATELET 684 41/38/2517 04:11 AM     Lab Results   Component Value Date/Time    Sodium 139 07/12/2019 04:11 AM    Potassium 3.6 07/12/2019 04:11 AM    Chloride 108 07/12/2019 04:11 AM    CO2 25 07/12/2019 04:11 AM    Anion gap 6 07/12/2019 04:11 AM    Glucose 105 (H) 07/12/2019 04:11 AM    BUN 13 07/12/2019 04:11 AM    Creatinine 0.87 07/12/2019 04:11 AM    BUN/Creatinine ratio 15 07/12/2019 04:11 AM    GFR est AA >60 07/12/2019 04:11 AM    GFR est non-AA >60 07/12/2019 04:11 AM    Calcium 8.6 07/12/2019 04:11 AM       Lab Results   Component Value Date/Time    aPTT 32.2 (H) 07/10/2019 03:41 AM     Lab Results   Component Value Date/Time    INR 1.3 (H) 07/10/2019 03:41 AM    INR 1.4 (H) 07/08/2019 12:24 PM    INR 1.1 07/05/2019 04:45 PM    Prothrombin time 12.7 (H) 07/10/2019 03:41 AM    Prothrombin time 13.5 (H) 07/08/2019 12:24 PM    Prothrombin time 11.5 (H) 07/05/2019 04:45 PM     No components found for: LAST LIPIDS    ATTENTION:   This medical record was transcribed using an electronic medical records/speech recognition system. Although proofread, it may and can contain electronic, spelling and other errors. Corrections may be executed at a later time.   Please feel free to contact us for any clarifications as needed.         Murray Baron MD

## 2020-11-04 RX ORDER — ATORVASTATIN CALCIUM 80 MG/1
TABLET, FILM COATED ORAL
Qty: 90 TAB | Refills: 3 | Status: SHIPPED | OUTPATIENT
Start: 2020-11-04 | End: 2021-06-15 | Stop reason: DRUGHIGH

## 2021-05-10 ENCOUNTER — TELEPHONE (OUTPATIENT)
Dept: CARDIOLOGY CLINIC | Age: 66
End: 2021-05-10

## 2021-05-10 DIAGNOSIS — I25.10 CORONARY ARTERY DISEASE INVOLVING NATIVE CORONARY ARTERY OF NATIVE HEART WITHOUT ANGINA PECTORIS: Primary | ICD-10-CM

## 2021-05-26 ENCOUNTER — DOCUMENTATION ONLY (OUTPATIENT)
Dept: CARDIOLOGY CLINIC | Age: 66
End: 2021-05-26

## 2021-05-26 NOTE — PROGRESS NOTES
Lab Results   Component Value Date/Time    Cholesterol, total 84 05/24/2021 10:32 AM    HDL Cholesterol 40 05/24/2021 10:32 AM    LDL, calculated 27.2 05/24/2021 10:32 AM    VLDL, calculated 16.8 05/24/2021 10:32 AM    Triglyceride 84 05/24/2021 10:32 AM    CHOL/HDL Ratio 2.1 05/24/2021 10:32 AM     On Lipitor 80mg daily - dec to 40mg daily    Ivette Prater MD, [facility]

## 2021-06-14 ENCOUNTER — TELEPHONE (OUTPATIENT)
Dept: CARDIOLOGY CLINIC | Age: 66
End: 2021-06-14

## 2021-06-15 RX ORDER — ATORVASTATIN CALCIUM 40 MG/1
40 TABLET, FILM COATED ORAL DAILY
Qty: 90 TABLET | Refills: 1 | Status: SHIPPED | OUTPATIENT
Start: 2021-06-15 | End: 2021-12-08

## 2021-06-15 NOTE — TELEPHONE ENCOUNTER
Sent patient a [employer] message in regards to recent labs.      Dec Lipitor to 40mg daily  (1/2 of 80 till gone and call in 40mg tabs)

## 2021-06-15 NOTE — TELEPHONE ENCOUNTER
Medication change per VO Dr Marv Jones    Requested Prescriptions     Pending Prescriptions Disp Refills    atorvastatin (LIPITOR) 40 mg tablet 90 Tablet 1     Sig: Take 1 Tablet by mouth daily.

## 2021-08-13 ENCOUNTER — OFFICE VISIT (OUTPATIENT)
Dept: CARDIOLOGY CLINIC | Age: 66
End: 2021-08-13
Payer: MEDICARE

## 2021-08-13 VITALS
HEIGHT: 72 IN | DIASTOLIC BLOOD PRESSURE: 70 MMHG | SYSTOLIC BLOOD PRESSURE: 128 MMHG | OXYGEN SATURATION: 100 % | BODY MASS INDEX: 23.98 KG/M2 | HEART RATE: 73 BPM | WEIGHT: 177 LBS

## 2021-08-13 DIAGNOSIS — I25.10 CORONARY ARTERY DISEASE INVOLVING NATIVE CORONARY ARTERY OF NATIVE HEART WITHOUT ANGINA PECTORIS: ICD-10-CM

## 2021-08-13 DIAGNOSIS — I10 ESSENTIAL HYPERTENSION: Primary | ICD-10-CM

## 2021-08-13 DIAGNOSIS — E78.5 DYSLIPIDEMIA: ICD-10-CM

## 2021-08-13 DIAGNOSIS — Z95.1 S/P CABG X 3: ICD-10-CM

## 2021-08-13 PROCEDURE — 93000 ELECTROCARDIOGRAM COMPLETE: CPT | Performed by: INTERNAL MEDICINE

## 2021-08-13 PROCEDURE — 99214 OFFICE O/P EST MOD 30 MIN: CPT | Performed by: INTERNAL MEDICINE

## 2021-08-13 NOTE — PROGRESS NOTES
Bonilla Rey is a 76 y.o. male    Visit Vitals  /70 (BP 1 Location: Left upper arm, BP Patient Position: Sitting, BP Cuff Size: Adult)   Pulse 73   Ht 6' (1.829 m)   Wt 177 lb (80.3 kg)   SpO2 100%   BMI 24.01 kg/m²       Chief Complaint   Patient presents with    Coronary Artery Disease    Cholesterol Problem    Hypertension       Chest pain NO  SOB NO  Dizziness NO  Swelling NO  Recent hospital visit NO  Refills NO

## 2021-08-13 NOTE — LETTER
8/13/2021    Patient: Agustín Ludwig. YOB: 1955   Date of Visit: 8/13/2021     Blanche Licea, [address] [address]  Via Fax: [phone]    Dear Blanche Licea MD,      Thank you for referring Mr. Rosalva Ruano to [facility] for evaluation. My notes for this consultation are attached. If you have questions, please do not hesitate to call me. I look forward to following your patient along with you.       Sincerely,    Kimberlee Murillo MD

## 2021-08-13 NOTE — PROGRESS NOTES
Zacarias Loco MD    [address] [facility] [place], [address] [facility]    Office [phone], [phone]      Amrit Parra is a 76 y.o. male is here for f/u visit for CAD    Primary care physician:  Gregg Beverly MD    Patient Active Problem List   Diagnosis Code    Chest pain R07.9    GERD (gastroesophageal reflux disease) K21.9    Ulcerative colitis, chronic ([facility] [address] K51.90    Elevated blood pressure reading R03.0    NSTEMI (non-ST elevated myocardial infarction) ([facility] [address] I21.4    CAD (coronary artery disease) I25.10    S/P CABG x 3 Z95.1           I had the pleasure of seeing Mr. Amrit Parra in the office today. Chief complaint:  Chief Complaint   Patient presents with    Coronary Artery Disease    Cholesterol Problem    Hypertension       Assessment:  CAD s/p CAGB*3 7/5/19  ( NSTEMI)  HTN  GERD  Hx of Ulcerative colitis  \" Passing out\"/sycnope with B Blockers started in hosp prior to DC. B Blockers DC'd. Not on B Blockers currently      Plan:   Per patient-Home systolic blood pressure is well controlled. Component of whitecoat hypertension. 5/26/21 On Lipitor 80mg daily - dec'd to 40mg daily because of low numbers on lipid panel. Will repeat lipid panel and if numbers look good we will continue 40 mg of Lipitor. Continue medications. Follow-up in 12 mths or earlier prn. Echocardiogram prior to visit. Aggressive cardiovascular risk factor modification. Patient understands the plan. All questions were answered to the patient's satisfaction. Medication Side Effects and Warnings were discussed with patient: yes  Patient Labs were reviewed and or requested:  yes  Patient Past Records were reviewed and or requested: yes    I appreciate the opportunity to be involved in . See note below for details. Please do not hesitate to contact us with questions or concerns. Zacarias Loco MD    Cardiac Testing/ Procedures: A. Cardiac Cath/PCI: 7/5/19 R radial access     Tortuous R SCV     L Main: Large; Distal 60% at trifurcation     LAD: Med; Prox 60%; haziness - thrombus vs Ca+ ; Small D1/ Small to med D2/D3; L to R collaterals present     LCflex: Med; Nml Mid 60%     RCA:  Med size; prox 70%; Distal 50%; PDA and PLB - MLI     LVEDP: Nml     LVEF: Not assessed/Difficulty getting into LV sec to tortuous R SCV     No significant gradient across aortic valve.           Closure Device: TR band    B.ECHO/BERTRAND:  11/12/19 - EF 56-60%; Grade 1 DD; Mild MR/Mild TR  7/5/19 - Mild LVH; EF 55-60%    C. StressNuclear/Stress ECHO/Stress test:    D.Vascular: 7/5/19 - Carotid doppler - <50% bilat ICA    E. EP:    F. Miscellaneous: 7/8/19 - LIMA to LAD; SVG to OM1; SVG to RCA ( Dr Stephanie Herman)    Subjective: Berlin Do is a 76 y.o. male who returns for follow up today. Doing well from cardiac standpoint. No CP. No dyspnea. Worked for the [address] [place] [address] [employer] for [employer]. Now retired    ROS:  (bold if positive, if negative)             Medications before admission:    Current Outpatient Medications   Medication Sig Dispense    atorvastatin (LIPITOR) 40 mg tablet Take 1 Tablet by mouth daily. 90 Tablet    glucosamine-chondroitin (OSTEO BI-FLEX) 250-200 mg tab Take  by mouth daily.  aspirin 81 mg chewable tablet Take 1 Tab by mouth daily. 30 Tab    ascorbic acid, vitamin C, (VITAMIN C) 1,000 mg tablet Take 1 Tab by mouth daily (with breakfast). 30 Tab    ferrous sulfate 325 mg (65 mg iron) tablet Take 1 Tab by mouth daily (with breakfast). 30 Tab    mesalamine DR (ASACOL HD) 800 mg DR tablet Take 800 mg by mouth daily. 3 times if needed     omeprazole (PRILOSEC) 40 mg capsule Take 40 mg by mouth daily. (Patient not taking: Reported on 8/13/2021)      No current facility-administered medications for this visit.        Family History of CAD:    No    Social History:  Current  Smoker  No    Physical Exam:  Visit Vitals  /70 (BP 1 Location: Left upper arm, BP Patient Position: Sitting, BP Cuff Size: Adult)   Pulse 73   Ht 6' (1.829 m)   Wt 177 lb (80.3 kg)   SpO2 100%   BMI 24.01 kg/m²          Gen: Well-developed, well-nourished, in no acute distress  Neck: Supple,No JVD, No Carotid Bruit,   Resp: No accessory muscle use, Clear breath sounds, No rales or rhonchi  Card: Regular Rate,Rythm,Normal S1, S2, No murmurs, rubs or gallop. No thrills. Abd:  Soft, BS+,   MSK: No cyanosis  Skin: No rashes    Neuro: moving all four extremities , follows commands appropriately  Psych:  Good insight, oriented to person, place , alert, Nml Affect  LE: No edema    EKG: SR/RBBB      LABS:        Lab Results   Component Value Date/Time    WBC 13.1 (H) 07/12/2019 04:11 AM    HGB 10.9 (L) 07/12/2019 04:11 AM    HCT 34.5 (L) 07/12/2019 04:11 AM    PLATELET 430 07/29/0376 04:11 AM     Lab Results   Component Value Date/Time    Sodium 139 07/12/2019 04:11 AM    Potassium 3.6 07/12/2019 04:11 AM    Chloride 108 07/12/2019 04:11 AM    CO2 25 07/12/2019 04:11 AM    Anion gap 6 07/12/2019 04:11 AM    Glucose 105 (H) 07/12/2019 04:11 AM    BUN 13 07/12/2019 04:11 AM    Creatinine 0.87 07/12/2019 04:11 AM    BUN/Creatinine ratio 15 07/12/2019 04:11 AM    GFR est AA >60 07/12/2019 04:11 AM    GFR est non-AA >60 07/12/2019 04:11 AM    Calcium 8.6 07/12/2019 04:11 AM       Lab Results   Component Value Date/Time    aPTT 32.2 (H) 07/10/2019 03:41 AM     Lab Results   Component Value Date/Time    INR 1.3 (H) 07/10/2019 03:41 AM    INR 1.4 (H) 07/08/2019 12:24 PM    INR 1.1 07/05/2019 04:45 PM    Prothrombin time 12.7 (H) 07/10/2019 03:41 AM    Prothrombin time 13.5 (H) 07/08/2019 12:24 PM    Prothrombin time 11.5 (H) 07/05/2019 04:45 PM     No components found for: LAST LIPIDS    ATTENTION:   This medical record was transcribed using an electronic medical records/speech recognition system.   Although proofread, it may and can contain electronic, spelling and other errors. Corrections may be executed at a later time. Please feel free to contact us for any clarifications as needed.         Vernon Jimenez MD

## 2021-08-13 NOTE — PATIENT INSTRUCTIONS
Learning About Coronary Artery Disease (CAD)  What is coronary artery disease? Coronary artery disease is a condition that occurs when plaque builds up in the arteries that bring oxygen-rich blood to your heart. Plaque is a fatty substance made of cholesterol, calcium, and other substances in the blood. This process is called hardening of the arteries, or atherosclerosis. What happens when you have coronary artery disease? · Plaque may narrow the coronary arteries. Narrowed arteries cause poor blood flow. This can lead to angina symptoms such as chest pain or discomfort. If blood flow is completely blocked, you could have a heart attack. · You can slow and reduce the risk of future problems by making changes in your lifestyle. These include quitting smoking and eating heart-healthy foods. · Treatment, along with changes in your lifestyle, can help you live a longer and healthier life. How can you prevent coronary artery disease? · Do not smoke. It may be the best thing you can do to prevent coronary artery disease. If you need help quitting, talk to your doctor about stop-smoking programs and medicines. These can increase your chances of quitting for good. · Be active. Try to do moderate activity at least 2½ hours a week. Or try to do vigorous activity at least 1¼ hours a week. You may want to walk or try other activities, such as running, swimming, cycling, or playing tennis or team sports. · Eat heart-healthy foods. Eat more fruits and vegetables and less food that contains saturated and trans fats. Limit alcohol, sodium, and sweets. · Stay at a healthy weight. Lose weight if you need to. · Manage other health problems such as diabetes, high blood pressure, and high cholesterol. How is coronary artery disease treated? · Your doctor will suggest that you make lifestyle changes. For example, your doctor may ask you to eat healthy foods, quit smoking, lose extra weight, and be more active.   · You will take medicines that help prevent a heart attack. · Your doctor may suggest a procedure to open narrowed or blocked arteries. This is called angioplasty. Or your doctor may suggest using healthy blood vessels to create detours around narrowed or blocked arteries. This is called bypass surgery. Follow-up care is a key part of your treatment and safety. Be sure to make and go to all appointments, and call your doctor if you are having problems. It's also a good idea to know your test results and keep a list of the medicines you take. Where can you learn more? Go to http://www.gray.com/  Enter C643 in the search box to learn more about \"Learning About Coronary Artery Disease (CAD). \"  Current as of: August 31, 2020               Content Version: 12.8  © 2006-2021 [employer]wise[employer]. Care instructions adapted under license by [employer] (which disclaims liability or warranty for this information). If you have questions about a medical condition or this instruction, always ask your healthcare professional. [address] any warranty or liability for your use of this information.

## 2022-03-18 PROBLEM — Z95.1 S/P CABG X 3: Status: ACTIVE | Noted: 2019-07-08

## 2022-03-18 PROBLEM — R07.9 CHEST PAIN: Status: ACTIVE | Noted: 2019-07-03

## 2022-03-19 PROBLEM — R03.0 ELEVATED BLOOD PRESSURE READING: Status: ACTIVE | Noted: 2019-07-03

## 2022-03-19 PROBLEM — I21.4 NSTEMI (NON-ST ELEVATED MYOCARDIAL INFARCTION) (HCC): Status: ACTIVE | Noted: 2019-07-04

## 2022-03-20 PROBLEM — I25.10 CAD (CORONARY ARTERY DISEASE): Status: ACTIVE | Noted: 2019-07-05

## 2024-08-28 NOTE — TELEPHONE ENCOUNTER
Patient states he was called by [facility] and to return the call. Patient would like to be contacted through My Chart. Will treat empirically for BV

## 2025-02-27 ENCOUNTER — OFFICE VISIT (OUTPATIENT)
Age: 70
End: 2025-02-27
Payer: MEDICARE

## 2025-02-27 VITALS
BODY MASS INDEX: 22.48 KG/M2 | HEART RATE: 70 BPM | SYSTOLIC BLOOD PRESSURE: 160 MMHG | DIASTOLIC BLOOD PRESSURE: 82 MMHG | OXYGEN SATURATION: 98 % | HEIGHT: 72 IN | WEIGHT: 166 LBS

## 2025-02-27 DIAGNOSIS — Z95.1 S/P CABG X 3: ICD-10-CM

## 2025-02-27 DIAGNOSIS — I25.10 CORONARY ARTERY DISEASE DUE TO LIPID RICH PLAQUE: ICD-10-CM

## 2025-02-27 DIAGNOSIS — I10 HYPERTENSION, UNSPECIFIED TYPE: Primary | ICD-10-CM

## 2025-02-27 DIAGNOSIS — I25.83 CORONARY ARTERY DISEASE DUE TO LIPID RICH PLAQUE: ICD-10-CM

## 2025-02-27 PROCEDURE — 3017F COLORECTAL CA SCREEN DOC REV: CPT | Performed by: INTERNAL MEDICINE

## 2025-02-27 PROCEDURE — G8420 CALC BMI NORM PARAMETERS: HCPCS | Performed by: INTERNAL MEDICINE

## 2025-02-27 PROCEDURE — 99204 OFFICE O/P NEW MOD 45 MIN: CPT | Performed by: INTERNAL MEDICINE

## 2025-02-27 PROCEDURE — 3077F SYST BP >= 140 MM HG: CPT | Performed by: INTERNAL MEDICINE

## 2025-02-27 PROCEDURE — 1159F MED LIST DOCD IN RCRD: CPT | Performed by: INTERNAL MEDICINE

## 2025-02-27 PROCEDURE — 1123F ACP DISCUSS/DSCN MKR DOCD: CPT | Performed by: INTERNAL MEDICINE

## 2025-02-27 PROCEDURE — G8427 DOCREV CUR MEDS BY ELIG CLIN: HCPCS | Performed by: INTERNAL MEDICINE

## 2025-02-27 PROCEDURE — 93010 ELECTROCARDIOGRAM REPORT: CPT | Performed by: INTERNAL MEDICINE

## 2025-02-27 PROCEDURE — 3078F DIAST BP <80 MM HG: CPT | Performed by: INTERNAL MEDICINE

## 2025-02-27 PROCEDURE — 1126F AMNT PAIN NOTED NONE PRSNT: CPT | Performed by: INTERNAL MEDICINE

## 2025-02-27 PROCEDURE — 93005 ELECTROCARDIOGRAM TRACING: CPT | Performed by: INTERNAL MEDICINE

## 2025-02-27 PROCEDURE — 1036F TOBACCO NON-USER: CPT | Performed by: INTERNAL MEDICINE

## 2025-02-27 RX ORDER — AMLODIPINE BESYLATE 5 MG/1
5 TABLET ORAL DAILY
COMMUNITY

## 2025-02-27 NOTE — PROGRESS NOTES
Reason to see patient: CAD, CABG    Referring: Nicole Hale MD    HPI: Becca Dhillon Jr. is a 69 y.o. male with past medical history significant for CAD, CABG in July 5, 2019.  Also has history of hypertension, GERD, ulcerative colitis who is here for routine evaluation.  He was lost to follow-up and last seen in our office in 2021 by Dr. Christian.  Currently denies any symptoms of chest pain, shortness of breath, lightheadedness, dizziness, presyncope syncope or    EKG in my office today demonstrated normal sinus rhythm with right bundle branch block with normal axis with normal intervals with normal ST segment.    Plan:    1.  CAD/CABG x 3 July 5, 2019: Continue medical management.  Check echocardiogram for cardiac function.  Last echocardiogram was in 2019 which was normal.  Continue aspirin, atorvastatin.    2.  Hypertension history of hypertension was on metoprolol which was discontinued after he had a syncopal episode.  Currently blood pressure is controlled.  But sometimes it goes up to 1 60-1 70 range.  Will start him on amlodipine 5 mg p.o. daily.    3.  Dyslipidemia: Continue atorvastatin.  PCP following.      4.  See me again in 1 year.        ATTENTION:   This medical record was transcribed using an electronic medical records/speech recognition system.  Although proofread, it may and can contain electronic, spelling and other errors.  Corrections may be executed at a later time.  Please feel free to contact us for any clarifications as needed.      Past Medical History:   Diagnosis Date    CAD (coronary artery disease)     NSTEMI with CABG X 3  7/2019    GERD (gastroesophageal reflux disease)     Hypertension     Ill-defined condition     Ulcerative colitis, chronic (HCC)             Past Surgical History:   Procedure Laterality Date    COLONOSCOPY N/A 1/7/2020    COLONOSCOPY/ AND ESOPHAGOGASTRODUODENOSCOPY (EGD) performed by Deion Diaz MD at [facility] ENDOSCOPY    [place] UNLISTED PROCEDURE

## 2025-02-27 NOTE — PROGRESS NOTES
Chief Complaint   Patient presents with    Hypertension    Coronary Artery Disease    NSTEMI     Vitals:    02/27/25 1046 02/27/25 1102   BP: (!) 160/60 (!) 160/82   Site: Left Upper Arm Right Upper Arm   Position: Sitting Sitting   Pulse: 72 70   SpO2: 98% 98%   Weight: 75.3 kg (166 lb)    Height: 1.829 m (6')        Chest pain YES     ER, urgent care, or hospitalized outside of [facility] since your last visit?  NO     Refills NO

## 2025-03-28 ENCOUNTER — ANCILLARY PROCEDURE (OUTPATIENT)
Age: 70
End: 2025-03-28
Payer: MEDICARE

## 2025-03-28 VITALS
WEIGHT: 166 LBS | SYSTOLIC BLOOD PRESSURE: 180 MMHG | HEART RATE: 69 BPM | HEIGHT: 72 IN | BODY MASS INDEX: 22.48 KG/M2 | DIASTOLIC BLOOD PRESSURE: 88 MMHG

## 2025-03-28 DIAGNOSIS — I25.10 CORONARY ARTERY DISEASE DUE TO LIPID RICH PLAQUE: ICD-10-CM

## 2025-03-28 DIAGNOSIS — I25.83 CORONARY ARTERY DISEASE DUE TO LIPID RICH PLAQUE: ICD-10-CM

## 2025-03-28 DIAGNOSIS — I10 HYPERTENSION, UNSPECIFIED TYPE: ICD-10-CM

## 2025-03-28 DIAGNOSIS — Z95.1 S/P CABG X 3: ICD-10-CM

## 2025-03-28 PROCEDURE — 93306 TTE W/DOPPLER COMPLETE: CPT | Performed by: INTERNAL MEDICINE

## 2025-04-02 LAB
ECHO AO ASC DIAM: 3.5 CM
ECHO AO ASCENDING AORTA INDEX: 1.78 CM/M2
ECHO AO ROOT DIAM: 3.4 CM
ECHO AO ROOT INDEX: 1.73 CM/M2
ECHO AV AREA PEAK VELOCITY: 4 CM2
ECHO AV AREA VTI: 4.1 CM2
ECHO AV AREA/BSA PEAK VELOCITY: 2 CM2/M2
ECHO AV AREA/BSA VTI: 2.1 CM2/M2
ECHO AV MEAN GRADIENT: 3 MMHG
ECHO AV MEAN VELOCITY: 0.9 M/S
ECHO AV PEAK GRADIENT: 6 MMHG
ECHO AV PEAK VELOCITY: 1.2 M/S
ECHO AV VELOCITY RATIO: 0.92
ECHO AV VTI: 21.4 CM
ECHO BSA: 1.96 M2
ECHO EST RA PRESSURE: 3 MMHG
ECHO LA DIAMETER INDEX: 2.08 CM/M2
ECHO LA DIAMETER: 4.1 CM
ECHO LA TO AORTIC ROOT RATIO: 1.21
ECHO LA VOL A-L A2C: 83 ML (ref 18–58)
ECHO LA VOL A-L A4C: 79 ML (ref 18–58)
ECHO LA VOL BP: 79 ML (ref 18–58)
ECHO LA VOL MOD A2C: 78 ML (ref 18–58)
ECHO LA VOL MOD A4C: 75 ML (ref 18–58)
ECHO LA VOL/BSA BIPLANE: 40 ML/M2 (ref 16–34)
ECHO LA VOLUME AREA LENGTH: 84 ML
ECHO LA VOLUME INDEX A-L A2C: 42 ML/M2 (ref 16–34)
ECHO LA VOLUME INDEX A-L A4C: 40 ML/M2 (ref 16–34)
ECHO LA VOLUME INDEX AREA LENGTH: 43 ML/M2 (ref 16–34)
ECHO LA VOLUME INDEX MOD A2C: 40 ML/M2 (ref 16–34)
ECHO LA VOLUME INDEX MOD A4C: 38 ML/M2 (ref 16–34)
ECHO LV E' LATERAL VELOCITY: 8.04 CM/S
ECHO LV E' SEPTAL VELOCITY: 3.61 CM/S
ECHO LV EF PHYSICIAN: 60 %
ECHO LV FRACTIONAL SHORTENING: 30 % (ref 28–44)
ECHO LV INTERNAL DIMENSION DIASTOLE INDEX: 2.54 CM/M2
ECHO LV INTERNAL DIMENSION DIASTOLIC: 5 CM (ref 4.2–5.9)
ECHO LV INTERNAL DIMENSION SYSTOLIC INDEX: 1.78 CM/M2
ECHO LV INTERNAL DIMENSION SYSTOLIC: 3.5 CM
ECHO LV IVSD: 1.1 CM (ref 0.6–1)
ECHO LV MASS 2D: 207.1 G (ref 88–224)
ECHO LV MASS INDEX 2D: 105.1 G/M2 (ref 49–115)
ECHO LV POSTERIOR WALL DIASTOLIC: 1.1 CM (ref 0.6–1)
ECHO LV RELATIVE WALL THICKNESS RATIO: 0.44
ECHO LVOT AREA: 4.2 CM2
ECHO LVOT AV VTI INDEX: 0.96
ECHO LVOT DIAM: 2.3 CM
ECHO LVOT MEAN GRADIENT: 2 MMHG
ECHO LVOT PEAK GRADIENT: 5 MMHG
ECHO LVOT PEAK VELOCITY: 1.1 M/S
ECHO LVOT STROKE VOLUME INDEX: 43.2 ML/M2
ECHO LVOT SV: 85.1 ML
ECHO LVOT VTI: 20.5 CM
ECHO MV A VELOCITY: 0.95 M/S
ECHO MV AREA PHT: 2.6 CM2
ECHO MV AREA VTI: 2.9 CM2
ECHO MV E DECELERATION TIME (DT): 286.8 MS
ECHO MV E VELOCITY: 0.65 M/S
ECHO MV E/A RATIO: 0.68
ECHO MV E/E' LATERAL: 8.08
ECHO MV E/E' RATIO (AVERAGED): 13.05
ECHO MV E/E' SEPTAL: 18.01
ECHO MV LVOT VTI INDEX: 1.43
ECHO MV MAX VELOCITY: 0.9 M/S
ECHO MV MEAN GRADIENT: 1 MMHG
ECHO MV MEAN VELOCITY: 0.5 M/S
ECHO MV PEAK GRADIENT: 3 MMHG
ECHO MV PRESSURE HALF TIME (PHT): 83.2 MS
ECHO MV VTI: 29.3 CM
ECHO RA AREA 4C: 21.5 CM2
ECHO RA END SYSTOLIC VOLUME APICAL 4 CHAMBER INDEX BSA: 34 ML/M2
ECHO RA VOLUME: 67 ML
ECHO RIGHT VENTRICULAR SYSTOLIC PRESSURE (RVSP): 26 MMHG
ECHO RV FREE WALL PEAK S': 7.7 CM/S
ECHO RV INTERNAL DIMENSION: 4 CM
ECHO RV TAPSE: 1.8 CM (ref 1.7–?)
ECHO TV REGURGITANT MAX VELOCITY: 2.42 M/S
ECHO TV REGURGITANT PEAK GRADIENT: 23 MMHG

## 2025-04-10 ENCOUNTER — RESULTS FOLLOW-UP (OUTPATIENT)
Age: 70
End: 2025-04-10

## 2025-06-04 NOTE — DISCHARGE SUMMARY
[facility]   Discharge Summary     Patient ID:  Carmen Aguilera  [id number]  20 y.o.  1955    Admit date: 7/5/2019    Discharge date: 7/12/2019     Admitting Physician: Mitul Arceo MD     Referring Cardiologist:  Fahad Cortes    PCP:  None    Admitting Diagnoses: CAD    Discharge Diagnoses:     Hospital Problems  Date Reviewed: 7/8/2019          Codes Class Noted POA    S/P CABG x 3 ICD-10-CM: Z95.1  ICD-9-CM: V45.81  7/8/2019 Unknown        CAD (coronary artery disease) ICD-10-CM: I25.10  ICD-9-CM: 414.00  7/5/2019 Unknown              Discharged Condition: stable    Disposition: home, see patient instructions for treatment and plan    Procedures for this admission:  Procedure(s):  CABG X 3 WITH CPB; DONOR SITES: LIMA AND LEFT SAPHENOUS VEIN VIA EVH; BERTRAND AND EPIAORTIC BY  [facility]  [facility]    Discharge Medications:      My Medications      START taking these medications      Instructions Each Dose to Equal Morning Noon Evening Bedtime   amiodarone 200 mg tablet  Commonly known as:  CORDARONE    Your last dose was: Your next dose is: Take 2 tabs by mouth twice daily for 2 weeks. Then take 2 tabs by mouth once daily for two weeks and then STOP. ascorbic acid (vitamin C) 1,000 mg tablet  Commonly known as:  VITAMIN C  Start taking on:  7/13/2019    Your last dose was: Your next dose is: Take 1 Tab by mouth daily (with breakfast). 1000 mg                 atorvastatin 80 mg tablet  Commonly known as:  LIPITOR  Start taking on:  7/13/2019    Your last dose was: Your next dose is: Take 1 Tab by mouth daily. 80 mg                 ferrous sulfate 325 mg (65 mg iron) tablet  Start taking on:  7/13/2019    Your last dose was: Your next dose is: Take 1 Tab by mouth daily (with breakfast). 325 mg                 oxyCODONE-acetaminophen 5-325 mg per tablet  Commonly known as:  PERCOCET    Your last dose was:       Your next dose is: Take 1 Tab by mouth every four (4) hours as needed for Pain for up to 3 days. Max Daily Amount: 6 Tabs. 1 Tab                 senna-docusate 8.6-50 mg per tablet  Commonly known as:  PERICOLACE    Your last dose was: Your next dose is: Take 1 Tab by mouth daily as needed for Constipation. 1 Tab                    CHANGE how you take these medications      Instructions Each Dose to Equal Morning Noon Evening Bedtime   aspirin 81 mg chewable tablet  Start taking on:  7/13/2019  What changed:    · medication strength  · how much to take    Your last dose was: Your next dose is: Take 1 Tab by mouth daily. 81 mg                    CONTINUE taking these medications      Instructions Each Dose to Equal Morning Noon Evening Bedtime   mesalamine  mg DR tablet  Commonly known as:  ASACOL HD    Your last dose was: Your next dose is: Take 800 mg by mouth daily. 800 mg                 omeprazole 40 mg capsule  Commonly known as:  PRILOSEC    Your last dose was: Your next dose is: Take 40 mg by mouth daily. 40 mg                       Where to Get Your Medications      Information on where to get these meds will be given to you by the nurse or doctor. Ask your nurse or doctor about these medications  · amiodarone 200 mg tablet  · ascorbic acid (vitamin C) 1,000 mg tablet  · aspirin 81 mg chewable tablet  · atorvastatin 80 mg tablet  · ferrous sulfate 325 mg (65 mg iron) tablet  · oxyCODONE-acetaminophen 5-325 mg per tablet  · senna-docusate 8.6-50 mg per tablet         HPI:  Micheal Galicia is a 58 y.o. male who was referred for cardiac evaluation of coronary artery disease, referred by Dr. Marquez Keys. Pt's PMH includes GERD, ulcerative colitis. Pt presented to [facility] with chief complaint of chest pain that started one day prior. It started after pt got up from working at the computer.  Described as moderate in severity, pressure like, tried tums without relief. No associated symptoms. Pt drove himself to the hospital, where the pain had resolved. In the ER, there were no ischemic changes in EKG and troponin peaked at 5. Pt had cardiac cath today, results below.      Pt is former tobacco user, quit 25 years ago, denies alcohol use. Denies drug use. No known family history of cardiac disease. Pt lives in [facility] independently. He denies have any close support system. He had large dogs he is concerned about getting their walks. Hospital Course: patient was transferred from [facility] and admitted for ultimate CABG which was done on 7/8/19 by Dr. Rebecca Meng. Please see his separately dictated op note for complete details. He was transported to the CVICU on amiodarone, precedex, insulin, and neosynephrine infusions. He was extubated without issues 7/8/19 @ 1530. POD #4:  1. CAD/NSTEMI s/p CABG: cont ASA/Statin, BB stopped due to orthostatic hypotension. 2. HTN: BB stopped due to above. May restart outpatient if needed. 3. GERD: pepcid for SUP, tums prn  4. Chronic ulcerative colitis: controlled w/ diet.  cont mesalamine. 5. Bradycardia: Cont PO per Fiser. Hold BB for now. 6. Anticipated acute blood loss anemia: On Fe/Vit C. Monitor HH  7. Thrombocytopenia: resolved  8. Leukocytosis: mild. Likely related to post op state. Mobilize. 9. Atelectasis: IS, coughing, deep breathing. 10. Constipation: miralax pericolace this am. Resolved.      Dispo: PT/OT. Home today    Referral to outpatient cardiac rehab made.      Discharge Vital Signs:   Visit Vitals  /87   Pulse 82   Temp 98.3 °F (36.8 °C)   Resp 18   Wt 190 lb 7.6 oz (86.4 kg)   SpO2 96%   BMI 25.83 kg/m²       Labs:   Recent Labs     07/12/19  0411 07/11/19  0728  07/10/19  0341   WBC 13.1*  --    < > 13.6*   HGB 10.9*  --    < > 10.1*   HCT 34.5*  --    < > 32.4*     --    < > 162     --    < > 137   K 3.6  --    < > 3.8   BUN 13  --    < > 22*   CREA 0.87 --    < > 1.19   *  --    < > 82   GLUCPOC  --  124*   < >  --    INR  --   --   --  1.3*    < > = values in this interval not displayed. Diagnostics:   CXR Results  (Last 48 hours)               07/11/19 1423  XR CHEST PA LAT Final result    Impression:  IMPRESSION: Small-to-moderate left pleural effusion. Narrative:  EXAM:  XR CHEST PA LAT       INDICATION:  Postop heart. Status post chest tube removal.       COMPARISON: July 10, 2019. TECHNIQUE: PA and lateral chest radiographs       FINDINGS: Interval removal of left chest tube. Small-to-moderate left pleural   effusion. Heart size and mediastinal contours are unchanged. No pulmonary edema. Right lung base opacity seen on the prior radiograph has resolved. No   significant right pleural effusion. Lungs are clear. Patient Instructions/Follow Up Care:  Discharge instructions were reviewed with the patient and family present. Questions were also answered at this time. Prescriptions and medications were reviewed. The patient has a follow up appointment with the Nurse Practitioner or Physician Assistant on 7/16/19 at 11:30am and with Dr. Scott Jay on 8/13/19 at 11:15 am. The patient was also instructed to follow up with his primary care physician as needed. The patient and family were encouraged to call with any questions or concerns.        Signed:  GHULAM Dobbins  7/12/2019  9:35 AM Unable to determine

## (undated) DEVICE — DRAPE SLUSH DISC W44XL66IN ST FOR RND BSIN HUSH SLUSH SYS

## (undated) DEVICE — SUTURE MCRYL SZ 3-0 L27IN ABSRB UD L24MM PS-1 3/8 CIR PRIM Y936H

## (undated) DEVICE — Device

## (undated) DEVICE — STRAP,POSITIONING,KNEE/BODY,FOAM,4X60": Brand: MEDLINE

## (undated) DEVICE — SUTURE PROL SZ 8-0 L24IN NONABSORBABLE BLU L6.5MM BV130-5 8732H

## (undated) DEVICE — BAG BELONG PT PERS CLEAR HANDL

## (undated) DEVICE — BAG RED 3PLY 2MIL 30X40 IN

## (undated) DEVICE — GOWN,SIRUS,NONRNF,SETINSLV,XL,20/CS: Brand: MEDLINE

## (undated) DEVICE — SYR 50ML LR LCK 1ML GRAD NSAF --

## (undated) DEVICE — COVER LT HNDL PLAS RIG 1 PER PK

## (undated) DEVICE — APPLICATOR BNDG 1MM ADH PREMIERPRO EXOFIN

## (undated) DEVICE — CATH DIAG D 5F 155 MULTIPK X3 -- IMPULSE 16391-302

## (undated) DEVICE — TIDISHIELD TRANSPORT, CONTAINMENT COVER FOR BACK TABLE 4'6" (1.37M) TO 8' (2.43M) IN LENGTH: Brand: TIDISHIELD

## (undated) DEVICE — SOLIDIFIER MEDC 1200ML -- CONVERT TO 356117

## (undated) DEVICE — ELECTRODE,RADIOTRANSLUCENT,FOAM,3PK: Brand: MEDLINE

## (undated) DEVICE — PROCEDURE KIT FLUID MGMT CUST MAINFOLD STRL

## (undated) DEVICE — INSERT SUT HLD F/OCTBS RETRCTR --

## (undated) DEVICE — SYR 10ML LUER LOK 1/5ML GRAD --

## (undated) DEVICE — GLIDESHEATH SLENDER ACCESS KIT: Brand: GLIDESHEATH SLENDER

## (undated) DEVICE — GLOVE SURG SZ 7.5 L11.2IN THK9.8MIL STRW LTX POLYMER BEAD

## (undated) DEVICE — TR BAND RADIAL ARTERY COMPRESSION DEVICE: Brand: TR BAND

## (undated) DEVICE — SUT PROL 4-0 30IN SH1 DA BLU --

## (undated) DEVICE — PAD,ABDOMINAL,5"X9",ST,LF,25/BX: Brand: MEDLINE INDUSTRIES, INC.

## (undated) DEVICE — 1200 GUARD II KIT W/5MM TUBE W/O VAC TUBE: Brand: GUARDIAN

## (undated) DEVICE — LUB SURG MEDC STRL 2OZ TUBE MC -- MEDICHOICE

## (undated) DEVICE — SOL ANTI-FOG 6ML MEDC -- MEDICHOICE - CONVERT TO 358427

## (undated) DEVICE — SENSOR TEMP SKIN DISP

## (undated) DEVICE — SOLUTION IV 1000ML 0.9% SOD CHL

## (undated) DEVICE — BLADE OPHTH 180DEG CUT SURF BLU STR SHRP DBL BVL GRINDLESS

## (undated) DEVICE — KIT BLWR MISTER 5P 15L W/ TBNG SET IRRIG MIST TO IMPROVE

## (undated) DEVICE — CANN NASAL O2 CAPNOGRAPHY AD -- FILTERLINE

## (undated) DEVICE — 3M™ CUROS™ DISINFECTING CAP FOR NEEDLELESS CONNECTORS 270/CARTON 20 CARTONS/CASE CFF1-270: Brand: CUROS™

## (undated) DEVICE — SUTURE SZ 7 L18IN NONABSORBABLE SIL CCS L48MM 1/2 CIR STRNM M655G

## (undated) DEVICE — BLADE OPHTH KNF D3MM 15DEG CATRCT BLU MICRO-SHARP

## (undated) DEVICE — VENT CATHETER: Brand: EDWARDS LIFESCIENCES VENT CATHETER

## (undated) DEVICE — ROSEN CURVED WIRE GUIDE: Brand: ROSEN

## (undated) DEVICE — TUBING INSUFLTN 10FT LUER -- CONVERT TO ITEM 368568

## (undated) DEVICE — BITEBLOCK ENDOSCP 60FR MAXI WHT POLYETH STURDY W/ VELC WVN

## (undated) DEVICE — REM POLYHESIVE ADULT PATIENT RETURN ELECTRODE: Brand: VALLEYLAB

## (undated) DEVICE — DRESSING AQUACEL ADVANTAGE ALG W4XL5IN RECT GREATER ABSRB HYDRFBR TECHNOLOGY

## (undated) DEVICE — CATH IV AUTOGRD BC PNK 20GA 25 -- INSYTE

## (undated) DEVICE — CATHETER IV 14GA L2IN POLYUR STR ORNG HUB SFTY RADPQ DISP

## (undated) DEVICE — NEEDLE HYPO 18GA L1.5IN PNK S STL HUB POLYPR SHLD REG BVL

## (undated) DEVICE — 40418 TRENDELENBURG ONE-STEP ARM PROTECTORS LARGE (1 PAIR): Brand: 40418 TRENDELENBURG ONE-STEP ARM PROTECTORS LARGE (1 PAIR)

## (undated) DEVICE — CLIP INT SM WIDE RED TI TRNSVRS GRV CHEVRON SHP W PRECIS

## (undated) DEVICE — SYS VSL HARV HEMOPRO2 VASOVIEW -- HARV SYS MINIMUM ORDER 5/EA

## (undated) DEVICE — WRAP SURG W1.31XL1.34M CARD FOR PT 165-172CM THERMOWRP

## (undated) DEVICE — SOLUTION IRRIG 1000ML H2O STRL BLT

## (undated) DEVICE — SYR MED COLOR CODED 3ML RED -- MEDALLION

## (undated) DEVICE — SUTURE PROL SZ 4-0 L36IN NONABSORBABLE BLU L26MM SH 1/2 CIR 8521H

## (undated) DEVICE — STERILE POLYISOPRENE POWDER-FREE SURGICAL GLOVES WITH EMOLLIENT COATING: Brand: PROTEXIS

## (undated) DEVICE — DRSG BORDR MPLX HEEL 8.7X9.1IN --

## (undated) DEVICE — STERNUM BLADE, OFFSET (31.7 X 0.64 X 6.3MM)

## (undated) DEVICE — SYR 3ML LL TIP 1/10ML GRAD --

## (undated) DEVICE — 72" ARTERIAL PRESSURE TUBING: Brand: ICU MEDICAL

## (undated) DEVICE — INTENDED FOR TISSUE SEPARATION, AND OTHER PROCEDURES THAT REQUIRE A SHARP SURGICAL BLADE TO PUNCTURE OR CUT.: Brand: BARD-PARKER ® CARBON RIB-BACK BLADES

## (undated) DEVICE — TRANSFER PK BLD PROD 300ML --

## (undated) DEVICE — (D)PREP SKN CHLRAPRP APPL 26ML -- CONVERT TO ITEM 371833

## (undated) DEVICE — AVID DUAL STAGE VENOUS DRAINAGE CANNULA: Brand: AVID DUAL STAGE VENOUS DRAINAGE CANNULA

## (undated) DEVICE — SUPPORT WRST AD W3.5XL9IN DIA14.5IN ART SFT ADJ HK AND LOOP

## (undated) DEVICE — DRAPE FLD WRM W44XL66IN C6L FOR INTRATEMP SYS THERMABASIN

## (undated) DEVICE — BANDAGE COMPR W6INXL10YD ST M E WHITE/BEIGE

## (undated) DEVICE — TIP SUCT BLU PLAS BLB W/O CTRL VENT YANK

## (undated) DEVICE — ADULT SPO2 SENSOR: Brand: NELLCOR

## (undated) DEVICE — INTENDED TO STANDARDIZE OR CAMERAS TO ALLOW FOR THE USE OF THE OR CAMERA COVER: Brand: ASPEN® O.R. CAMERA COVER

## (undated) DEVICE — CATH GUID COR EB35 6FR 100CM -- LAUNCHER

## (undated) DEVICE — PRESSURE MONITORING SET: Brand: TRUWAVE

## (undated) DEVICE — SUT PROL 7-0 24IN BV1 DA BLU --

## (undated) DEVICE — BAG SPEC BIOHZRD 10 X 10 IN --

## (undated) DEVICE — 6 FOOT DISPOSABLE EXTENSION CABLE WITH SAFE CONNECT / SCREW-DOWN

## (undated) DEVICE — SOLUTION IV 500ML 0.9% SOD CHL FLX CONT

## (undated) DEVICE — TRAY CATHETER 16FR F INCLUDE LUB URIN M TEMP SENS 2 STATLOK STBL

## (undated) DEVICE — SIMPLICITY FLUFF UNDERPAD 23X36, MODERATE: Brand: SIMPLICITY

## (undated) DEVICE — TEMP PACING WIRE: Brand: MYO/WIRE

## (undated) DEVICE — FORCEPS BX L240CM JAW DIA2.8MM L CAP W/ NDL MIC MESH TOOTH

## (undated) DEVICE — KIT COLON W/ 1.1OZ LUB AND 2 END

## (undated) DEVICE — SUTURE TICRON DBL ARMED 2 0 CV 305 42IN BLU N ABSRB BRAID 8886303551

## (undated) DEVICE — DRAPE PRB US TRNSDCR 6X96IN --

## (undated) DEVICE — LEAD PCMKR MYOCARDL BPLR TEMP. --

## (undated) DEVICE — DRAIN SURG 24FR L5/16IN DIA8MM SIL RND HUBLESS FULL FLUT

## (undated) DEVICE — MEDI-VAC NON-CONDUCTIVE SUCTION TUBING: Brand: CARDINAL HEALTH

## (undated) DEVICE — SYRINGE ANGIO 10ML RED FLAT GRP FIX M LUER CONN MEDALLION

## (undated) DEVICE — SUMP INTCARD W/ 1/4INCH CONN 20FRENCH 15INCH DLP

## (undated) DEVICE — BANDAGE COMPR ELASTIC 5 YDX6 IN

## (undated) DEVICE — SET GRAV CK VLV NEEDLESS ST 3 GANGED 4WAY STPCOCK HI FLO 10

## (undated) DEVICE — SUTURE PROL SZ 6-0 L24IN NONABSORBABLE BLU L13MM C-1 3/8 8726H

## (undated) DEVICE — CONTAINER SPEC 20 ML LID NEUT BUFF FORMALIN 10 % POLYPR STS

## (undated) DEVICE — SOLUTION IV 1000ML PH 7.4 INJ NRMSOL R

## (undated) DEVICE — BAG PRESSURE INFUSION 1000ML -- CONVERT TO ITEM 360122

## (undated) DEVICE — TUBING PRSS MON L60IN PVC RIG NONEXPANDING M TO FEM CONN

## (undated) DEVICE — PUNCH AORT L7.75IN DIA4MM STD FULL LEN DMND EDGE CUT FREE

## (undated) DEVICE — PACK PROCEDURE SURG HRT CATH

## (undated) DEVICE — 3000CC GUARDIAN II: Brand: GUARDIAN